# Patient Record
Sex: FEMALE | Race: WHITE | NOT HISPANIC OR LATINO | URBAN - METROPOLITAN AREA
[De-identification: names, ages, dates, MRNs, and addresses within clinical notes are randomized per-mention and may not be internally consistent; named-entity substitution may affect disease eponyms.]

---

## 2016-03-21 RX ORDER — OXYCODONE HYDROCHLORIDE 5 MG/1
1 TABLET ORAL
Qty: 0 | Refills: 0 | COMMUNITY
Start: 2016-03-21 | End: 2016-03-31

## 2017-03-01 ENCOUNTER — OUTPATIENT (OUTPATIENT)
Dept: OUTPATIENT SERVICES | Facility: HOSPITAL | Age: 72
LOS: 1 days | End: 2017-03-01
Payer: COMMERCIAL

## 2017-03-01 ENCOUNTER — APPOINTMENT (OUTPATIENT)
Dept: CT IMAGING | Facility: CLINIC | Age: 72
End: 2017-03-01

## 2017-03-01 DIAGNOSIS — C50.819 MALIGNANT NEOPLASM OF OVERLAPPING SITES OF UNSPECIFIED FEMALE BREAST: ICD-10-CM

## 2017-03-01 DIAGNOSIS — Z45.2 ENCOUNTER FOR ADJUSTMENT AND MANAGEMENT OF VASCULAR ACCESS DEVICE: Chronic | ICD-10-CM

## 2017-03-01 DIAGNOSIS — Z98.89 OTHER SPECIFIED POSTPROCEDURAL STATES: Chronic | ICD-10-CM

## 2017-03-01 PROCEDURE — 71260 CT THORAX DX C+: CPT

## 2017-03-01 PROCEDURE — 82565 ASSAY OF CREATININE: CPT

## 2017-03-17 ENCOUNTER — OUTPATIENT (OUTPATIENT)
Dept: OUTPATIENT SERVICES | Facility: HOSPITAL | Age: 72
LOS: 1 days | End: 2017-03-17
Payer: COMMERCIAL

## 2017-03-17 DIAGNOSIS — Z98.89 OTHER SPECIFIED POSTPROCEDURAL STATES: Chronic | ICD-10-CM

## 2017-03-17 DIAGNOSIS — C50.819 MALIGNANT NEOPLASM OF OVERLAPPING SITES OF UNSPECIFIED FEMALE BREAST: ICD-10-CM

## 2017-03-17 DIAGNOSIS — Z45.2 ENCOUNTER FOR ADJUSTMENT AND MANAGEMENT OF VASCULAR ACCESS DEVICE: Chronic | ICD-10-CM

## 2017-03-17 PROCEDURE — A9560: CPT

## 2017-03-17 PROCEDURE — 78472 GATED HEART PLANAR SINGLE: CPT | Mod: 26

## 2017-03-17 PROCEDURE — 78472 GATED HEART PLANAR SINGLE: CPT

## 2017-03-31 ENCOUNTER — APPOINTMENT (OUTPATIENT)
Dept: RADIATION ONCOLOGY | Facility: CLINIC | Age: 72
End: 2017-03-31

## 2017-03-31 ENCOUNTER — OUTPATIENT (OUTPATIENT)
Dept: OUTPATIENT SERVICES | Facility: HOSPITAL | Age: 72
LOS: 1 days | Discharge: HOME | End: 2017-03-31

## 2017-03-31 VITALS
TEMPERATURE: 97.6 F | HEART RATE: 107 BPM | RESPIRATION RATE: 18 BRPM | WEIGHT: 157.6 LBS | SYSTOLIC BLOOD PRESSURE: 165 MMHG | OXYGEN SATURATION: 98 % | DIASTOLIC BLOOD PRESSURE: 91 MMHG | BODY MASS INDEX: 25.33 KG/M2 | HEIGHT: 66 IN

## 2017-03-31 DIAGNOSIS — Z98.89 OTHER SPECIFIED POSTPROCEDURAL STATES: Chronic | ICD-10-CM

## 2017-03-31 DIAGNOSIS — I89.0 LYMPHEDEMA, NOT ELSEWHERE CLASSIFIED: ICD-10-CM

## 2017-03-31 DIAGNOSIS — Z45.2 ENCOUNTER FOR ADJUSTMENT AND MANAGEMENT OF VASCULAR ACCESS DEVICE: Chronic | ICD-10-CM

## 2017-04-17 VITALS
TEMPERATURE: 97.5 F | HEART RATE: 102 BPM | WEIGHT: 147.2 LBS | HEIGHT: 66 IN | BODY MASS INDEX: 23.66 KG/M2 | RESPIRATION RATE: 16 BRPM | OXYGEN SATURATION: 100 % | SYSTOLIC BLOOD PRESSURE: 179 MMHG | DIASTOLIC BLOOD PRESSURE: 93 MMHG

## 2017-04-17 RX ORDER — ERGOCALCIFEROL 1.25 MG/1
1.25 MG CAPSULE, LIQUID FILLED ORAL
Qty: 4 | Refills: 0 | Status: COMPLETED | COMMUNITY
Start: 2016-11-03

## 2017-04-17 RX ORDER — AMOXICILLIN 500 MG/1
500 CAPSULE ORAL
Qty: 30 | Refills: 0 | Status: COMPLETED | COMMUNITY
Start: 2017-01-17

## 2017-04-17 RX ORDER — ONDANSETRON 8 MG/1
8 TABLET, ORALLY DISINTEGRATING ORAL
Qty: 30 | Refills: 0 | Status: COMPLETED | COMMUNITY
Start: 2017-01-02

## 2017-04-17 RX ORDER — LETROZOLE TABLETS 2.5 MG/1
2.5 TABLET, FILM COATED ORAL
Qty: 30 | Refills: 0 | Status: COMPLETED | COMMUNITY
Start: 2016-10-11

## 2017-05-24 ENCOUNTER — OUTPATIENT (OUTPATIENT)
Dept: OUTPATIENT SERVICES | Facility: HOSPITAL | Age: 72
LOS: 1 days | End: 2017-05-24
Payer: COMMERCIAL

## 2017-05-24 DIAGNOSIS — Z98.89 OTHER SPECIFIED POSTPROCEDURAL STATES: Chronic | ICD-10-CM

## 2017-05-24 DIAGNOSIS — C50.819 MALIGNANT NEOPLASM OF OVERLAPPING SITES OF UNSPECIFIED FEMALE BREAST: ICD-10-CM

## 2017-05-24 DIAGNOSIS — Z45.2 ENCOUNTER FOR ADJUSTMENT AND MANAGEMENT OF VASCULAR ACCESS DEVICE: Chronic | ICD-10-CM

## 2017-05-24 PROCEDURE — 78472 GATED HEART PLANAR SINGLE: CPT

## 2017-05-24 PROCEDURE — 78472 GATED HEART PLANAR SINGLE: CPT | Mod: 26,52

## 2017-05-24 PROCEDURE — A9560: CPT

## 2017-05-26 ENCOUNTER — APPOINTMENT (OUTPATIENT)
Dept: CT IMAGING | Facility: CLINIC | Age: 72
End: 2017-05-26

## 2017-05-26 ENCOUNTER — OUTPATIENT (OUTPATIENT)
Dept: OUTPATIENT SERVICES | Facility: HOSPITAL | Age: 72
LOS: 1 days | End: 2017-05-26
Payer: COMMERCIAL

## 2017-05-26 DIAGNOSIS — Z98.89 OTHER SPECIFIED POSTPROCEDURAL STATES: Chronic | ICD-10-CM

## 2017-05-26 DIAGNOSIS — Z45.2 ENCOUNTER FOR ADJUSTMENT AND MANAGEMENT OF VASCULAR ACCESS DEVICE: Chronic | ICD-10-CM

## 2017-05-26 DIAGNOSIS — Z00.8 ENCOUNTER FOR OTHER GENERAL EXAMINATION: ICD-10-CM

## 2017-05-26 PROCEDURE — 82565 ASSAY OF CREATININE: CPT

## 2017-05-26 PROCEDURE — 71260 CT THORAX DX C+: CPT

## 2017-05-26 PROCEDURE — 70491 CT SOFT TISSUE NECK W/DYE: CPT

## 2017-05-31 ENCOUNTER — APPOINTMENT (OUTPATIENT)
Dept: VASCULAR SURGERY | Facility: CLINIC | Age: 72
End: 2017-05-31

## 2017-05-31 VITALS
RESPIRATION RATE: 16 BRPM | WEIGHT: 150 LBS | TEMPERATURE: 98.3 F | DIASTOLIC BLOOD PRESSURE: 79 MMHG | HEIGHT: 65 IN | HEART RATE: 113 BPM | SYSTOLIC BLOOD PRESSURE: 149 MMHG | OXYGEN SATURATION: 95 % | BODY MASS INDEX: 24.99 KG/M2

## 2017-05-31 DIAGNOSIS — Z82.49 FAMILY HISTORY OF ISCHEMIC HEART DISEASE AND OTHER DISEASES OF THE CIRCULATORY SYSTEM: ICD-10-CM

## 2017-05-31 DIAGNOSIS — R91.8 OTHER NONSPECIFIC ABNORMAL FINDING OF LUNG FIELD: ICD-10-CM

## 2017-05-31 DIAGNOSIS — J39.8 OTHER SPECIFIED DISEASES OF UPPER RESPIRATORY TRACT: ICD-10-CM

## 2017-05-31 DIAGNOSIS — Z84.1 FAMILY HISTORY OF DISORDERS OF KIDNEY AND URETER: ICD-10-CM

## 2017-05-31 DIAGNOSIS — Z86.2 PERSONAL HISTORY OF DISEASES OF THE BLOOD AND BLOOD-FORMING ORGANS AND CERTAIN DISORDERS INVOLVING THE IMMUNE MECHANISM: ICD-10-CM

## 2017-05-31 DIAGNOSIS — Z85.3 PERSONAL HISTORY OF MALIGNANT NEOPLASM OF BREAST: ICD-10-CM

## 2017-05-31 DIAGNOSIS — Z86.79 PERSONAL HISTORY OF OTHER DISEASES OF THE CIRCULATORY SYSTEM: ICD-10-CM

## 2017-06-15 ENCOUNTER — APPOINTMENT (OUTPATIENT)
Dept: RADIATION ONCOLOGY | Facility: CLINIC | Age: 72
End: 2017-06-15

## 2017-06-16 ENCOUNTER — APPOINTMENT (OUTPATIENT)
Dept: RADIATION ONCOLOGY | Facility: CLINIC | Age: 72
End: 2017-06-16

## 2017-08-18 ENCOUNTER — OUTPATIENT (OUTPATIENT)
Dept: OUTPATIENT SERVICES | Facility: HOSPITAL | Age: 72
LOS: 1 days | End: 2017-08-18
Payer: COMMERCIAL

## 2017-08-18 DIAGNOSIS — C50.819 MALIGNANT NEOPLASM OF OVERLAPPING SITES OF UNSPECIFIED FEMALE BREAST: ICD-10-CM

## 2017-08-18 DIAGNOSIS — Z45.2 ENCOUNTER FOR ADJUSTMENT AND MANAGEMENT OF VASCULAR ACCESS DEVICE: Chronic | ICD-10-CM

## 2017-08-18 DIAGNOSIS — Z98.89 OTHER SPECIFIED POSTPROCEDURAL STATES: Chronic | ICD-10-CM

## 2017-08-18 PROCEDURE — A9560: CPT

## 2017-08-18 PROCEDURE — 78472 GATED HEART PLANAR SINGLE: CPT

## 2017-08-18 PROCEDURE — 78472 GATED HEART PLANAR SINGLE: CPT | Mod: 26

## 2017-11-08 ENCOUNTER — OUTPATIENT (OUTPATIENT)
Dept: OUTPATIENT SERVICES | Facility: HOSPITAL | Age: 72
LOS: 1 days | End: 2017-11-08
Payer: COMMERCIAL

## 2017-11-08 DIAGNOSIS — Z98.89 OTHER SPECIFIED POSTPROCEDURAL STATES: Chronic | ICD-10-CM

## 2017-11-08 DIAGNOSIS — Z45.2 ENCOUNTER FOR ADJUSTMENT AND MANAGEMENT OF VASCULAR ACCESS DEVICE: Chronic | ICD-10-CM

## 2017-11-08 DIAGNOSIS — C50.819 MALIGNANT NEOPLASM OF OVERLAPPING SITES OF UNSPECIFIED FEMALE BREAST: ICD-10-CM

## 2017-11-08 PROCEDURE — 78472 GATED HEART PLANAR SINGLE: CPT | Mod: 26

## 2017-11-08 PROCEDURE — 78472 GATED HEART PLANAR SINGLE: CPT

## 2017-11-08 PROCEDURE — A9560: CPT

## 2018-02-01 ENCOUNTER — OUTPATIENT (OUTPATIENT)
Dept: OUTPATIENT SERVICES | Facility: HOSPITAL | Age: 73
LOS: 1 days | End: 2018-02-01
Payer: COMMERCIAL

## 2018-02-01 DIAGNOSIS — Z98.89 OTHER SPECIFIED POSTPROCEDURAL STATES: Chronic | ICD-10-CM

## 2018-02-01 DIAGNOSIS — Z45.2 ENCOUNTER FOR ADJUSTMENT AND MANAGEMENT OF VASCULAR ACCESS DEVICE: Chronic | ICD-10-CM

## 2018-02-01 DIAGNOSIS — C50.819 MALIGNANT NEOPLASM OF OVERLAPPING SITES OF UNSPECIFIED FEMALE BREAST: ICD-10-CM

## 2018-02-01 PROCEDURE — A9560: CPT

## 2018-02-01 PROCEDURE — 78472 GATED HEART PLANAR SINGLE: CPT | Mod: 26

## 2018-02-01 PROCEDURE — 78472 GATED HEART PLANAR SINGLE: CPT

## 2018-05-02 ENCOUNTER — OUTPATIENT (OUTPATIENT)
Dept: OUTPATIENT SERVICES | Facility: HOSPITAL | Age: 73
LOS: 1 days | End: 2018-05-02
Payer: COMMERCIAL

## 2018-05-02 DIAGNOSIS — Z98.89 OTHER SPECIFIED POSTPROCEDURAL STATES: Chronic | ICD-10-CM

## 2018-05-02 DIAGNOSIS — C50.819 MALIGNANT NEOPLASM OF OVERLAPPING SITES OF UNSPECIFIED FEMALE BREAST: ICD-10-CM

## 2018-05-02 DIAGNOSIS — Z45.2 ENCOUNTER FOR ADJUSTMENT AND MANAGEMENT OF VASCULAR ACCESS DEVICE: Chronic | ICD-10-CM

## 2018-05-02 PROCEDURE — A9560: CPT

## 2018-05-02 PROCEDURE — 78472 GATED HEART PLANAR SINGLE: CPT | Mod: 26

## 2018-05-02 PROCEDURE — 78472 GATED HEART PLANAR SINGLE: CPT

## 2018-05-07 ENCOUNTER — APPOINTMENT (OUTPATIENT)
Dept: CT IMAGING | Facility: CLINIC | Age: 73
End: 2018-05-07
Payer: COMMERCIAL

## 2018-05-07 ENCOUNTER — OUTPATIENT (OUTPATIENT)
Dept: OUTPATIENT SERVICES | Facility: HOSPITAL | Age: 73
LOS: 1 days | End: 2018-05-07
Payer: COMMERCIAL

## 2018-05-07 DIAGNOSIS — Z98.89 OTHER SPECIFIED POSTPROCEDURAL STATES: Chronic | ICD-10-CM

## 2018-05-07 DIAGNOSIS — Z45.2 ENCOUNTER FOR ADJUSTMENT AND MANAGEMENT OF VASCULAR ACCESS DEVICE: Chronic | ICD-10-CM

## 2018-05-07 DIAGNOSIS — C50.819 MALIGNANT NEOPLASM OF OVERLAPPING SITES OF UNSPECIFIED FEMALE BREAST: ICD-10-CM

## 2018-05-07 PROCEDURE — 71250 CT THORAX DX C-: CPT | Mod: 26

## 2018-05-07 PROCEDURE — 74177 CT ABD & PELVIS W/CONTRAST: CPT | Mod: 26

## 2018-05-07 PROCEDURE — 82565 ASSAY OF CREATININE: CPT

## 2018-05-07 PROCEDURE — 74177 CT ABD & PELVIS W/CONTRAST: CPT

## 2018-05-07 PROCEDURE — 71250 CT THORAX DX C-: CPT

## 2018-06-28 ENCOUNTER — OUTPATIENT (OUTPATIENT)
Dept: OUTPATIENT SERVICES | Facility: HOSPITAL | Age: 73
LOS: 1 days | End: 2018-06-28
Payer: COMMERCIAL

## 2018-06-28 ENCOUNTER — APPOINTMENT (OUTPATIENT)
Dept: MRI IMAGING | Facility: CLINIC | Age: 73
End: 2018-06-28
Payer: COMMERCIAL

## 2018-06-28 DIAGNOSIS — Z98.89 OTHER SPECIFIED POSTPROCEDURAL STATES: Chronic | ICD-10-CM

## 2018-06-28 DIAGNOSIS — Z45.2 ENCOUNTER FOR ADJUSTMENT AND MANAGEMENT OF VASCULAR ACCESS DEVICE: Chronic | ICD-10-CM

## 2018-06-28 DIAGNOSIS — Z00.8 ENCOUNTER FOR OTHER GENERAL EXAMINATION: ICD-10-CM

## 2018-06-28 PROCEDURE — 82565 ASSAY OF CREATININE: CPT

## 2018-06-28 PROCEDURE — 73723 MRI JOINT LWR EXTR W/O&W/DYE: CPT | Mod: 26,LT

## 2018-06-28 PROCEDURE — A9585: CPT

## 2018-06-28 PROCEDURE — 73723 MRI JOINT LWR EXTR W/O&W/DYE: CPT

## 2018-07-31 ENCOUNTER — OTHER (OUTPATIENT)
Age: 73
End: 2018-07-31

## 2018-08-03 ENCOUNTER — OUTPATIENT (OUTPATIENT)
Dept: OUTPATIENT SERVICES | Facility: HOSPITAL | Age: 73
LOS: 1 days | End: 2018-08-03
Payer: COMMERCIAL

## 2018-08-03 DIAGNOSIS — Z98.89 OTHER SPECIFIED POSTPROCEDURAL STATES: Chronic | ICD-10-CM

## 2018-08-03 DIAGNOSIS — C50.819 MALIGNANT NEOPLASM OF OVERLAPPING SITES OF UNSPECIFIED FEMALE BREAST: ICD-10-CM

## 2018-08-03 DIAGNOSIS — Z45.2 ENCOUNTER FOR ADJUSTMENT AND MANAGEMENT OF VASCULAR ACCESS DEVICE: Chronic | ICD-10-CM

## 2018-08-03 PROCEDURE — A9560: CPT

## 2018-08-03 PROCEDURE — 78472 GATED HEART PLANAR SINGLE: CPT

## 2018-08-03 PROCEDURE — 78472 GATED HEART PLANAR SINGLE: CPT | Mod: 26

## 2018-08-06 ENCOUNTER — APPOINTMENT (OUTPATIENT)
Dept: ORTHOPEDIC SURGERY | Facility: CLINIC | Age: 73
End: 2018-08-06
Payer: COMMERCIAL

## 2018-08-06 VITALS
SYSTOLIC BLOOD PRESSURE: 144 MMHG | HEIGHT: 65 IN | DIASTOLIC BLOOD PRESSURE: 85 MMHG | HEART RATE: 74 BPM | BODY MASS INDEX: 24.99 KG/M2 | WEIGHT: 150 LBS

## 2018-08-06 DIAGNOSIS — Z82.61 FAMILY HISTORY OF ARTHRITIS: ICD-10-CM

## 2018-08-06 DIAGNOSIS — Z80.1 FAMILY HISTORY OF MALIGNANT NEOPLASM OF TRACHEA, BRONCHUS AND LUNG: ICD-10-CM

## 2018-08-06 DIAGNOSIS — Z78.9 OTHER SPECIFIED HEALTH STATUS: ICD-10-CM

## 2018-08-06 PROCEDURE — 99203 OFFICE O/P NEW LOW 30 MIN: CPT

## 2018-08-06 PROCEDURE — 73502 X-RAY EXAM HIP UNI 2-3 VIEWS: CPT | Mod: LT

## 2018-08-06 RX ORDER — CARBOPLATIN 10 MG/ML
INJECTION, SOLUTION INTRAVENOUS
Refills: 0 | Status: DISCONTINUED | COMMUNITY
End: 2018-08-06

## 2018-08-06 RX ORDER — TRASTUZUMAB 150 MG/7.4ML
INJECTION, POWDER, LYOPHILIZED, FOR SOLUTION INTRAVENOUS
Refills: 0 | Status: DISCONTINUED | COMMUNITY
End: 2018-08-06

## 2018-08-06 RX ORDER — PACLITAXEL 100 MG/20ML
INJECTION, POWDER, LYOPHILIZED, FOR SUSPENSION INTRAVENOUS
Refills: 0 | Status: DISCONTINUED | COMMUNITY
End: 2018-08-06

## 2018-09-07 ENCOUNTER — OUTPATIENT (OUTPATIENT)
Dept: OUTPATIENT SERVICES | Facility: HOSPITAL | Age: 73
LOS: 1 days | End: 2018-09-07
Payer: COMMERCIAL

## 2018-09-07 DIAGNOSIS — C50.819 MALIGNANT NEOPLASM OF OVERLAPPING SITES OF UNSPECIFIED FEMALE BREAST: ICD-10-CM

## 2018-09-07 DIAGNOSIS — Z98.89 OTHER SPECIFIED POSTPROCEDURAL STATES: Chronic | ICD-10-CM

## 2018-09-07 DIAGNOSIS — Z45.2 ENCOUNTER FOR ADJUSTMENT AND MANAGEMENT OF VASCULAR ACCESS DEVICE: Chronic | ICD-10-CM

## 2018-09-07 PROCEDURE — 87070 CULTURE OTHR SPECIMN AEROBIC: CPT

## 2018-09-07 PROCEDURE — 87186 SC STD MICRODIL/AGAR DIL: CPT

## 2018-09-09 LAB
-  AMIKACIN: SIGNIFICANT CHANGE UP
-  AZTREONAM: SIGNIFICANT CHANGE UP
-  CEFEPIME: SIGNIFICANT CHANGE UP
-  CEFTAZIDIME: SIGNIFICANT CHANGE UP
-  CIPROFLOXACIN: SIGNIFICANT CHANGE UP
-  GENTAMICIN: SIGNIFICANT CHANGE UP
-  IMIPENEM: SIGNIFICANT CHANGE UP
-  LEVOFLOXACIN: SIGNIFICANT CHANGE UP
-  MEROPENEM: SIGNIFICANT CHANGE UP
-  PIPERACILLIN/TAZOBACTAM: SIGNIFICANT CHANGE UP
-  TOBRAMYCIN: SIGNIFICANT CHANGE UP
CULTURE RESULTS: SIGNIFICANT CHANGE UP
METHOD TYPE: SIGNIFICANT CHANGE UP
ORGANISM # SPEC MICROSCOPIC CNT: SIGNIFICANT CHANGE UP
ORGANISM # SPEC MICROSCOPIC CNT: SIGNIFICANT CHANGE UP
SPECIMEN SOURCE: SIGNIFICANT CHANGE UP

## 2018-09-14 ENCOUNTER — OUTPATIENT (OUTPATIENT)
Dept: OUTPATIENT SERVICES | Facility: HOSPITAL | Age: 73
LOS: 1 days | End: 2018-09-14
Payer: COMMERCIAL

## 2018-09-14 DIAGNOSIS — C50.819 MALIGNANT NEOPLASM OF OVERLAPPING SITES OF UNSPECIFIED FEMALE BREAST: ICD-10-CM

## 2018-09-14 DIAGNOSIS — Z45.2 ENCOUNTER FOR ADJUSTMENT AND MANAGEMENT OF VASCULAR ACCESS DEVICE: Chronic | ICD-10-CM

## 2018-09-14 DIAGNOSIS — Z98.89 OTHER SPECIFIED POSTPROCEDURAL STATES: Chronic | ICD-10-CM

## 2018-09-14 PROCEDURE — 36569 INSJ PICC 5 YR+ W/O IMAGING: CPT

## 2018-09-14 PROCEDURE — 76942 ECHO GUIDE FOR BIOPSY: CPT

## 2018-09-14 PROCEDURE — 76000 FLUOROSCOPY <1 HR PHYS/QHP: CPT

## 2018-10-04 ENCOUNTER — INPATIENT (INPATIENT)
Facility: HOSPITAL | Age: 73
LOS: 7 days | Discharge: HOME HEALTH | DRG: 315 | End: 2018-10-12
Attending: INTERNAL MEDICINE | Admitting: FAMILY MEDICINE
Payer: MEDICARE

## 2018-10-04 VITALS — HEIGHT: 66 IN | WEIGHT: 141.98 LBS

## 2018-10-04 DIAGNOSIS — D89.9 DISORDER INVOLVING THE IMMUNE MECHANISM, UNSPECIFIED: ICD-10-CM

## 2018-10-04 DIAGNOSIS — Z98.89 OTHER SPECIFIED POSTPROCEDURAL STATES: Chronic | ICD-10-CM

## 2018-10-04 DIAGNOSIS — Z45.2 ENCOUNTER FOR ADJUSTMENT AND MANAGEMENT OF VASCULAR ACCESS DEVICE: Chronic | ICD-10-CM

## 2018-10-04 DIAGNOSIS — C50.919 MALIGNANT NEOPLASM OF UNSPECIFIED SITE OF UNSPECIFIED FEMALE BREAST: ICD-10-CM

## 2018-10-04 DIAGNOSIS — R78.81 BACTEREMIA: ICD-10-CM

## 2018-10-04 DIAGNOSIS — D72.823 LEUKEMOID REACTION: ICD-10-CM

## 2018-10-04 DIAGNOSIS — D64.9 ANEMIA, UNSPECIFIED: ICD-10-CM

## 2018-10-04 DIAGNOSIS — I10 ESSENTIAL (PRIMARY) HYPERTENSION: ICD-10-CM

## 2018-10-04 DIAGNOSIS — C50.911 MALIGNANT NEOPLASM OF UNSPECIFIED SITE OF RIGHT FEMALE BREAST: ICD-10-CM

## 2018-10-04 LAB
ANION GAP SERPL CALC-SCNC: 10 MMOL/L — SIGNIFICANT CHANGE UP (ref 5–17)
APTT BLD: 35.1 SEC — SIGNIFICANT CHANGE UP (ref 27.5–37.4)
BLD GP AB SCN SERPL QL: SIGNIFICANT CHANGE UP
BUN SERPL-MCNC: 21 MG/DL — HIGH (ref 8–20)
CALCIUM SERPL-MCNC: 9.5 MG/DL — SIGNIFICANT CHANGE UP (ref 8.6–10.2)
CHLORIDE SERPL-SCNC: 103 MMOL/L — SIGNIFICANT CHANGE UP (ref 98–107)
CO2 SERPL-SCNC: 27 MMOL/L — SIGNIFICANT CHANGE UP (ref 22–29)
CREAT SERPL-MCNC: 0.61 MG/DL — SIGNIFICANT CHANGE UP (ref 0.5–1.3)
GLUCOSE SERPL-MCNC: 145 MG/DL — HIGH (ref 70–115)
HCT VFR BLD CALC: 23.5 % — LOW (ref 37–47)
HGB BLD-MCNC: 6.5 G/DL — CRITICAL LOW (ref 12–16)
INR BLD: 2.18 RATIO — HIGH (ref 0.88–1.16)
MCHC RBC-ENTMCNC: 25.3 PG — LOW (ref 27–31)
MCHC RBC-ENTMCNC: 27.7 G/DL — LOW (ref 32–36)
MCV RBC AUTO: 91.4 FL — SIGNIFICANT CHANGE UP (ref 81–99)
PLATELET # BLD AUTO: 251 K/UL — SIGNIFICANT CHANGE UP (ref 150–400)
POTASSIUM SERPL-MCNC: 4.6 MMOL/L — SIGNIFICANT CHANGE UP (ref 3.5–5.3)
POTASSIUM SERPL-SCNC: 4.6 MMOL/L — SIGNIFICANT CHANGE UP (ref 3.5–5.3)
PROTHROM AB SERPL-ACNC: 24.4 SEC — HIGH (ref 9.8–12.7)
RBC # BLD: 2.57 M/UL — LOW (ref 4.4–5.2)
RBC # FLD: 16.8 % — HIGH (ref 11–15.6)
SODIUM SERPL-SCNC: 140 MMOL/L — SIGNIFICANT CHANGE UP (ref 135–145)
TYPE + AB SCN PNL BLD: SIGNIFICANT CHANGE UP
WBC # BLD: 10.9 K/UL — HIGH (ref 4.8–10.8)
WBC # FLD AUTO: 10.9 K/UL — HIGH (ref 4.8–10.8)

## 2018-10-04 PROCEDURE — 99254 IP/OBS CNSLTJ NEW/EST MOD 60: CPT

## 2018-10-04 PROCEDURE — 99223 1ST HOSP IP/OBS HIGH 75: CPT

## 2018-10-04 PROCEDURE — 71045 X-RAY EXAM CHEST 1 VIEW: CPT | Mod: 26

## 2018-10-04 PROCEDURE — 99285 EMERGENCY DEPT VISIT HI MDM: CPT

## 2018-10-04 RX ORDER — VANCOMYCIN HCL 1 G
1000 VIAL (EA) INTRAVENOUS ONCE
Qty: 0 | Refills: 0 | Status: COMPLETED | OUTPATIENT
Start: 2018-10-04 | End: 2018-10-04

## 2018-10-04 RX ORDER — AMLODIPINE BESYLATE 2.5 MG/1
5 TABLET ORAL DAILY
Qty: 0 | Refills: 0 | Status: DISCONTINUED | OUTPATIENT
Start: 2018-10-04 | End: 2018-10-12

## 2018-10-04 RX ORDER — FUROSEMIDE 40 MG
20 TABLET ORAL ONCE
Qty: 0 | Refills: 0 | Status: COMPLETED | OUTPATIENT
Start: 2018-10-04 | End: 2018-10-05

## 2018-10-04 RX ADMIN — Medication 250 MILLIGRAM(S): at 20:36

## 2018-10-04 NOTE — H&P ADULT - PROBLEM SELECTOR PLAN 1
pt's anemia is likely of chronic disease,, no overt gi bleed. prbc transfusion, follow repeat cbc. pt. hemodynamically stable. gi and hem / onc on the case.  hold xarelto if possible, will follow oncologist dr. Galicia and dr. Black recommendations.

## 2018-10-04 NOTE — CONSULT NOTE ADULT - PROBLEM SELECTOR PROBLEM 3
Malignant neoplasm of both breasts in female, estrogen receptor negative, unspecified site of breast

## 2018-10-04 NOTE — ED ADULT NURSE NOTE - PMH
Breast cancer    DVT of upper extremity (deep vein thrombosis)  RIGHT  Essential hypertension    Lymphedema    PICC (peripherally inserted central catheter) in place

## 2018-10-04 NOTE — ED PROVIDER NOTE - OBJECTIVE STATEMENT
:72 year old woman with history of breast cancer, metastatic, SVC syndrome, prior DVT, on xarelto, on new medication for metastatic breast cancer, with recurrent PICC, midline infection was noted to have non functional midline, removed yesterday and noted to have enterococccus bactermia and now with anemia and low ferritin. Follows up with Dr Galicia. No complaints of GI bleeding. No nausea, vomiting. No rectal bleeding. No fever. Sent for PRBC transfusion. GI evaluation for anemia and low ferritin. Patient had EGD and colonoscopy performed less than 1 year ago. No ulcers. 2 polyps on colonoscopy and diverticulosis.     pts feeling ok offers no c/o does have rash and erythema to the left upper arm  -seen by ID beatrice admitted for IV abx renal dosing and seen by GI

## 2018-10-04 NOTE — H&P ADULT - FAMILY HISTORY
Family history of cancer of frontal sinus, followed by enucleation     Family history of lung cancer     Sibling  Still living? Unknown  Family history of breast cancer in first degree relative, Age at diagnosis: Age Unknown

## 2018-10-04 NOTE — CONSULT NOTE ADULT - SUBJECTIVE AND OBJECTIVE BOX
[facility]  INFECTIOUS DISEASES AND INTERNAL MEDICINE at [place]  =======================================================  Murray Duncan MD  Diplomates American Board of Internal Medicine and Infectious Diseases  =======================================================    [id number]  DOMINIK Hi is a 72y  Female with HER-2 positive breast cancer,  initial right breast involvement and also involvement of the left breast, she had been treated with chemotherapy including Abraxane, Carbo and Herceptin, She had also received Taxotere and Perjeta, also with 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  She had a longstanding PICC line for infusion of recent chemotherapy of Poziotinib, as part of the [id number] study.  on 9/7/18, there was an infection of the PICC line and it was removed.  She was given a course of Keflex and on 9/14/18, a midline was placed.   patient recently developed drainage under the dressing of the mid line. Also noted was that the midline was flushing but not drawing.  It was removed 10/3/18; Blood cultures x 2 sets were sent and 4 of 4 bottles are growing enterococcus. Of note, patient prior PICC tip grew out pan-susceptible Pseudomonas.     No current fevers. WBC of 14K  Photos from midline dressing reviewed      =======================================================  Past Medical & Surgical Hx:  =====================  PAST MEDICAL & SURGICAL HISTORY:  Essential hypertension  Lymphedema  PICC (peripherally inserted central catheter) in place  DVT of upper extremity (deep vein thrombosis): RIGHT  Breast cancer  S/P thoracentesis: 3/2016  S/P biopsy: R breast  PICC (peripherally inserted central catheter) in place      Problem List:  ==========  HEALTH ISSUES - PROBLEM Dx:      Social Hx:  =======  no toxic habits currently    FAMILY HISTORY:  Family history of breast cancer in first degree relative (Sibling): s/p mastectomy  Family history of lung cancer  Family history of cancer of frontal sinus: followed by enucleation  no significant family history of immunosuppressive disorders in mother or father    Allergies    No Known Allergies    Intolerances        Antibiotics:  vancomycin  IVPB 1000 milliGRAM(s) IV Intermittent once    Other medications:          =======================================================  REVIEW OF SYSTEMS:  as above  all other ROS negative    ======================================================  Physical Exam:  ============  T(F): 98.6 (04 Oct 2018 13:02), Max: 98.6 (04 Oct 2018 13:02)  HR: 114 (04 Oct 2018 13:02)  BP: 128/74 (04 Oct 2018 13:02)  RR: 18 (04 Oct 2018 13:02)  SpO2: 96% (04 Oct 2018 13:02) (96% - 96%)  Height (cm): 167.64 (10-04-18 @ 12:26)  Weight (kg): 64.4 (10-04-18 @ 12:26)  BMI (kg/m2): 22.9 (10-04-18 @ 12:26)  BSA (m2): 1.73 (10-04-18 @ 12:26)    General:  No acute distress.  Eye: Pupils are equal, round and reactive to light, Extraocular movements are intact, Normal conjunctiva.  HENT: Normocephalic, Oral mucosa is moist, No pharyngeal erythema, No sinus tenderness.  Neck: Supple, No lymphadenopathy.  Respiratory: Lungs are clear to auscultation, Respirations are non-labored.  Cardiovascular: Normal rate, Regular rhythm, No murmur, Good pulses equal in all extremities,  Right UPPER ext with compression hosiery.   Gastrointestinal: Soft, Non-tender, Non-distended, Normal bowel sounds.  Genitourinary: No costovertebral angle tenderness.  Lymphatics: No lymphadenopathy neck,   Musculoskeletal: Normal range of motion, Normal strength.  Integumentary: No rash.  Neurologic: Alert, Oriented, No focal deficits, Cranial Nerves II-XII are grossly intact.  Psychiatric: Appropriate mood & affect.      =======================================================  Labs:  ====  Labs:                        6.9    14.1  )-----------( 271      ( 03 Oct 2018 17:38 )             24.9              Culture - Blood (collected 10-03-18 @ 17:38)  Source: .Blood    Culture - Blood (collected 10-03-18 @ 17:38)  Source: .Blood  Organism: Blood Culture PCR (10-04-18 @ 12:06)  Organism: Blood Culture PCR (10-04-18 @ 12:06)    Sensitivities:      -  Enterococcus species: Detec      Method Type: PCR

## 2018-10-04 NOTE — ED ADULT NURSE NOTE - NSFALLRSKPASTHIST_ED_ALL_ED
Opal De La Rosa MD [facility] Internal Medicine [phone]        Informed patient to continue his dose of 2.5 mg.  F/up as scheduled 11-7-17. no

## 2018-10-04 NOTE — ED ADULT NURSE REASSESSMENT NOTE - NS ED NURSE REASSESS COMMENT FT1
Assumed pt care at 2030.  Pt awake, alert and oriented x3 offering no complaints at this time.  #20 IV to left arm, patent.  IV abx infusing at this time.  Pt aware of plan of care.

## 2018-10-04 NOTE — ED ADULT NURSE REASSESSMENT NOTE - NS ED NURSE REASSESS COMMENT FT1
Upon preparing to  blood, IV found to be kinked and not infusing vanco.  New IV placed, #20 in left hand, IV vanco infusing well at this time. Handoff report given to LEONOR Garcia.

## 2018-10-04 NOTE — H&P ADULT - HISTORY OF PRESENT ILLNESS
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 pt. had a longstanding PICC line for infusion of recent chemotherapy of Poziotinib.   on 9/7/18, there was an infection of the PICC line and it was removed.  She was given a course of Keflex and on 9/14/18, a midline was placed.   patient recently developed drainage under the dressing of the mid line. mid line was not working properly and  It was removed 10/3/18; Blood cultures x 2 sets were sent and 4 of 4 bottles are growing enterococcus. Of note, patient prior PICC tip grew out pan-susceptible Pseudomonas.  pt. has no fever. no abd. pain. no n/v/d. no blood per rectum, no cp. no sob. pt. was evaluated by ID, GI and hem / onc today. pt. has anemia but did not have overt GI bleed as per GI and rectal exam per GI did not show any bleeding. 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 pt. had a longstanding PICC line for infusion of recent chemotherapy of Poziotinib.   on 9/7/18, there was an infection of the PICC line and it was removed.  She was given a course of Keflex and on 9/14/18, a midline was placed.   patient recently developed drainage under the dressing of the mid line. mid line was not working properly and  It was removed 10/3/18; Blood cultures x 2 sets were sent and 4 of 4 bottles are growing enterococcus. Of note, patient prior PICC tip grew out pan-susceptible Pseudomonas.  pt. has no fever. no abd. pain. no n/v/d. no blood per rectum, no cp. no sob. pt. was evaluated by ID, GI and hem / onc today. pt. has anemia but did not have overt GI bleed as per GI and rectal exam per GI did not show any bleeding. pt. reports that she was diagnosed with blood clot in her rt. shoulder area in 2015, was started on xeralto, about 8 months later on repeat doppler no blood clot was found but her oncologist kept her on xeralto. 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 pt. had a longstanding PICC line for infusion of recent chemotherapy of Poziotinib.   on 9/7/18, there was an infection of the PICC line and it was removed.  She was given a course of Keflex and on 9/14/18, a midline was placed.   patient recently developed drainage under the dressing of the mid line. mid line was not working properly and  It was removed 10/3/18; Blood cultures x 2 sets were sent and 4 of 4 bottles are growing enterococcus. Of note, patient prior PICC tip grew out pan-susceptible Pseudomonas.  pt. has no fever. no abd. pain. no n/v/d. no blood per rectum, no cp. no sob. pt. was evaluated by ID, GI and hem / onc today. pt. has anemia but did not have overt GI bleed as per GI and rectal exam per GI did not show any bleeding. pt. reports that she was diagnosed with blood clot in her rt. shoulder area in 2015, was started on xeralto, about 8 months later on repeat doppler no blood clot was found but her oncologist kept her on xeralto. pt. was sent to ER by her oncologist dr. Galicia.

## 2018-10-04 NOTE — ED ADULT TRIAGE NOTE - CHIEF COMPLAINT QUOTE
Patient arrived to ED today after she had a blood culture done and preformed yesterday and that she must need iv antibiotics.  Patient had PICC line removed yesterday.  Patient last received oral chemo yesterday.

## 2018-10-04 NOTE — ED STATDOCS - OBJECTIVE STATEMENT
Telemedicine assessment was conducted (using real time 2 way audio-video technology) by Ld Guillen MD located at [address] [place] [id number]  ++++++++++++++++++++++++  Pertinent patient history and initial plan: 72 y F c midline removed yesterday (placed for chemo -- stage IV breast CA), last infusion approx 4 mon ago but was flushed approx 1 wk ago.  No f/c.  Has been feeling fatigued and with malaise.  Recent dx anemia 6.8 (2 days ago).  Had diffuse rash several weeks ago, recently no rash.  Has pain at site, some d/c from site of midline.  2 wk ago had PICC that had been removed 2/2 local skin infection.  Midline was placed at that time.  Not currently on abx.    MDM -- infected midline.  IV abx.  Blood cx.  Admit.  --BMM

## 2018-10-04 NOTE — CONSULT NOTE ADULT - SUBJECTIVE AND OBJECTIVE BOX
HPI: Patient is a 72y Female seen on consultation for the evaluation and management of metastatic breast cancer, Her-2 positive, heavily treated. Diagnosed in 2015  after presenting with right-sided pleural effusion , weight loss, extesnive adenopathy and bilateral breast cancer, ER/UT, Her-2 pos.  Currently on study drug Poziotinib, an oral pan Her-2 inhibitor.  She has active chest wall disease and lung involvement.  She has SVC Sx with dilated veins on chest wall and obstructed veins in neck.  Has had multiple PICC lines LUE; recently discontinued because of infection; midline placed after 3-4 days on po antibiotics; Midline infected; d/c'd yesterday; given po Keflex; Bl Cx growing enterococcus species.  Instructed to come to ER for treatment.  has no fevers, chills or sweats, nausea, vomiting or abdominal; pain.  Has diarrhea from study drug; drug on hold  Also anemic, with fall in Hb without reported bleeding, but serum ferritin is low.  She has been on long term steroids.  has known avascular necrosis of hip. Not considered surgical candidate.     PAST MEDICAL & SURGICAL HISTORY:  Essential hypertension  Lymphedema  PICC (peripherally inserted central catheter) in place  DVT of upper extremity (deep vein thrombosis): RIGHT  Breast cancer  S/P thoracentesis: 3/2016  S/P biopsy: R breast  PICC (peripherally inserted central catheter) in place      REVIEW OF SYSTEMS      General:Fatigued	    Skin/Breast:Dilated veins on chest and abdominal wall, resolving rash  	  Ophthalmologic:no double visio  	  ENMT:	no sore throat    Respiratory and Thorax:ANGEL  	  Cardiovascular:	no chest pain or palpitations    Gastrointestinal:	No abdominal pain, nausea or vomiting    Genitourinary:	no dysuria    Musculoskeletal:	left hip pain    Neurological:	no tremor    	        	    Allergic/Immunologic:	    MEDICATIONS  (STANDING):  vancomycin  IVPB 1000 milliGRAM(s) IV Intermittent once    MEDICATIONS  (PRN):      Allergies    No Known Allergies    Intolerances        SOCIAL HISTORY:    Smoking Status:denied  Alcohol:Denied  Marital Status:  Occupation:not working    FAMILY HISTORY:  Family history of breast cancer in first degree relative (Sibling): s/p mastectomy  Family history of lung cancer  Family history of cancer of frontal sinus: followed by enucleation      	      Vital Signs Last 24 Hrs  T(C): 37 (04 Oct 2018 13:02), Max: 37 (04 Oct 2018 13:02)  T(F): 98.6 (04 Oct 2018 13:02), Max: 98.6 (04 Oct 2018 13:02)  HR: 114 (04 Oct 2018 13:02) (114 - 114)  BP: 128/74 (04 Oct 2018 13:02) (128/74 - 128/74)  BP(mean): --  RR: 18 (04 Oct 2018 13:02) (18 - 18)  SpO2: 96% (04 Oct 2018 13:02) (96% - 96%)    PHYSICAL EXAM:      Constitutional:Chronically ill    Eyes:anicteric, pale    ENMT:no lesion    Neck:Dilated neck veins    Breasts:right breast contractyed with tumor, with nodularuity in chest wall, mild erythema; left breast without palp mass        Respiratory:Clear    Cardiovascular:RRR normal S1S2    Gastrointestinal:Soft, non-tender            Extremities:Bilateral UE lymphedema            Skin:Dilated veins on chest and abdominal wall                    LABS:                        6.9    14.1  )-----------( 271      ( 03 Oct 2018 17:38 )             24.9                 RADIOLOGY & ADDITIONAL STUDIES:

## 2018-10-04 NOTE — CONSULT NOTE ADULT - ASSESSMENT
Patient with anemia with bacteremia with no evidence of overt GI bleeding. She gets frequent nose bleeding. Also she had EGD and colonoscopy performed less than 1 year ago. She is on xarelto and also on prednisone. No NSAIDs. At this time, I recommend consideration for stopping xarelto if it can be stopped. Can consider iron infusion/supplementation once possible.   Transfuse PRBC  Treat for bacteremia  Will not recommend EGD or colonoscopy at this time

## 2018-10-04 NOTE — CONSULT NOTE ADULT - ASSESSMENT
Imp: 1.  Metastatic ER,OR, Her-2 pos breast cancer, on oral pan Her-2 inhibitor, admitted with bacteremia secondary to midline infection, growing enterococcus;    Discussed with ID who will see patient and advise  2. Anemia, with low serum ferritin on long term steroids; needs transfusion and IV iron     Requested GI consult ?EGD  3.  Additional chemotherapy was infection clears  4.  Will d/w IR Radiology if other access can be obtained-could never havew port because of obstruction;  never stented; has been radiated in past for SVC

## 2018-10-04 NOTE — CONSULT NOTE ADULT - ASSESSMENT
This 72 y.o. F with breast cancer undergoing chemotherapy, prior PICC line infection, and now here with mid line infection, s/p removal, blood cultures with Enterococcus bacteremia.     - repeat cultures x 2 sets ordered  - will start vancomycin 1 gram x 1 dose     - needs to check CMP or BMP.   - dose Vancomycin base on renal function.     will trend WBC   monitor for fevers      - will check echocardiogram to r/o valvular lesions.

## 2018-10-04 NOTE — H&P ADULT - NSHPPHYSICALEXAM_GEN_ALL_CORE
General:  female medium build lying in bed in NAD.   HEENT: AT, NC. PERRL. intact EOM. no throat erythema or exudate.   Neck: supple. no JVD.  Chest: CTA bilaterally  Heart: S1,S2 II VI XI. RRR. no heart murmur.  Abdomen: soft. non-tender. obese, + BS.   Rectal : deferred by pt. had one done by GI and no rectal bleeding reported.  Ext: no C/C/E. no calf tenderness on either side.  ROM of all ext. intact.  Vascular : 2 + dp B/L.   Neuro: AAO x3. no focal weakness. no speech disorder. CN II to XII intact. motor / sensory intact.  Skin: no rash noted, mild skin pallor. no diaphoresis.

## 2018-10-04 NOTE — ED ADULT NURSE REASSESSMENT NOTE - NS ED NURSE REASSESS COMMENT FT1
Transport present to take pt to [facility], spoke with Dr. Hood pt is stable and may go to test before transfusion started.

## 2018-10-04 NOTE — CONSULT NOTE ADULT - SUBJECTIVE AND OBJECTIVE BOX
HPI:72 year old woman with history of breast cancer, metastatic, SVC syndrome, prior DVT, on xarelto, on new medication for metastatic breast cancer, with recurrent PICC, midline infection was noted to have non functional midline, removed yesterday and noted to have enterococccus bactermia and now with anemia and low ferritin. Follows up with Dr Galicia. No complaints of GI bleeding. No nausea, vomiting. No rectal bleeding. No fever. Sent for PRBC transfusion. GI evaluation for anemia and low ferritin. Patient had EGD and colonoscopy performed less than 1 year ago. No ulcers. 2 polyps on colonoscopy and diverticulosis.       PAST MEDICAL & SURGICAL HISTORY:  Essential hypertension  Lymphedema  PICC (peripherally inserted central catheter) in place  DVT of upper extremity (deep vein thrombosis): RIGHT  Breast cancer  S/P thoracentesis: 3/2016  S/P biopsy: R breast  PICC (peripherally inserted central catheter) in place      ROS:  No Heartburn, regurgitation, dysphagia, odynophagia.  No dyspepsia  No abdominal pain.    No Nausea, vomiting.  No Bleeding.  No hematemesis.   No diarrhea.    No hematochesia.  No weight loss, anorexia.  No edema.      MEDICATIONS  (STANDING):  vancomycin  IVPB 1000 milliGRAM(s) IV Intermittent once    MEDICATIONS  (PRN):      Allergies    No Known Allergies    Intolerances        SOCIAL HISTORY:Denies x 3    ENDOSCOPIC/GI HISTORY:EGD/Colonoscopy about 7-8 months ago. Colon polyps and diverticulosis    FAMILY HISTORY:  Family history of breast cancer in first degree relative (Sibling): s/p mastectomy  Family history of lung cancer  Family history of cancer of frontal sinus: followed by enucleation      Vital Signs Last 24 Hrs  T(C): 37 (04 Oct 2018 13:02), Max: 37 (04 Oct 2018 13:02)  T(F): 98.6 (04 Oct 2018 13:02), Max: 98.6 (04 Oct 2018 13:02)  HR: 114 (04 Oct 2018 13:02) (114 - 114)  BP: 128/74 (04 Oct 2018 13:02) (128/74 - 128/74)  BP(mean): --  RR: 18 (04 Oct 2018 13:02) (18 - 18)  SpO2: 96% (04 Oct 2018 13:02) (96% - 96%)    PHYSICAL EXAM:    GENERAL: NAD, well-groomed, well-developed  HEAD:  Atraumatic, Normocephalic  EYES: EOMI, PERRLA, conjunctiva and sclera clear. Pale  ENMT: No tonsillar erythema, exudates, or enlargement; Moist mucous membranes, Good dentition, No lesions  NECK: Supple, No JVD, Normal thyroid  CHEST/LUNG: Clear to percussion bilaterally; No rales, rhonchi, wheezing, or rubs. Distended veins  HEART: Regular rate and rhythm; No murmurs, rubs, or gallops  ABDOMEN: Soft, Nontender, Nondistended; Bowel sounds present. Distended veins  RECTAL: No mass. No bleeding  EXTREMITIES:  2+ Peripheral Pulses, No clubbing, cyanosis, or edema  LYMPH: No lymphadenopathy noted  SKIN: No rashes or lesions      LABS:                        6.9    14.1  )-----------( 271      ( 03 Oct 2018 17:38 )             24.9                        RADIOLOGY & ADDITIONAL STUDIES: HPI:72 year old woman with history of breast cancer, metastatic, SVC syndrome, prior DVT, on xarelto, on new medication for metastatic breast cancer, with recurrent PICC, midline infection was noted to have non functional midline, removed yesterday and noted to have enterococccus bactermia and now with anemia and low ferritin. Follows up with Dr Galicia. No complaints of GI bleeding. No nausea, vomiting. No rectal bleeding. No fever. Sent for PRBC transfusion. GI evaluation for anemia and low ferritin. Patient had EGD and colonoscopy performed less than 1 year ago. No ulcers. 2 polyps on colonoscopy and diverticulosis.       PAST MEDICAL & SURGICAL HISTORY:  Essential hypertension  Lymphedema  PICC (peripherally inserted central catheter) in place  DVT of upper extremity (deep vein thrombosis): RIGHT  Breast cancer  S/P thoracentesis: 3/2016  S/P biopsy: R breast  PICC (peripherally inserted central catheter) in place      ROS:  No Heartburn, regurgitation, dysphagia, odynophagia.  No dyspepsia  No abdominal pain.    No Nausea, vomiting.  No Bleeding.  No hematemesis.   No diarrhea.    No hematochesia.  No weight loss, anorexia.  No edema.      MEDICATIONS  (STANDING):  vancomycin  IVPB 1000 milliGRAM(s) IV Intermittent once    MEDICATIONS  (PRN):      Allergies    No Known Allergies    Intolerances        SOCIAL HISTORY:Denies x 3    ENDOSCOPIC/GI HISTORY:EGD/Colonoscopy about 7-8 months ago. Colon polyps and diverticulosis    FAMILY HISTORY:  Family history of breast cancer in first degree relative (Sibling): s/p mastectomy  Family history of lung cancer  Family history of cancer of frontal sinus: followed by enucleation      Vital Signs Last 24 Hrs  T(C): 37 (04 Oct 2018 13:02), Max: 37 (04 Oct 2018 13:02)  T(F): 98.6 (04 Oct 2018 13:02), Max: 98.6 (04 Oct 2018 13:02)  HR: 114 (04 Oct 2018 13:02) (114 - 114)  BP: 128/74 (04 Oct 2018 13:02) (128/74 - 128/74)  BP(mean): --  RR: 18 (04 Oct 2018 13:02) (18 - 18)  SpO2: 96% (04 Oct 2018 13:02) (96% - 96%)    PHYSICAL EXAM:    GENERAL: NAD, well-groomed, well-developed  HEAD:  Atraumatic, Normocephalic  EYES: EOMI, PERRLA, conjunctiva and sclera clear. Pale  ENMT: No tonsillar erythema, exudates, or enlargement; Moist mucous membranes, Good dentition, No lesions  NECK: Supple, No JVD, Normal thyroid  CHEST/LUNG: Clear to percussion bilaterally; No rales, rhonchi, wheezing, or rubs. Distended veins  HEART: Regular rate and rhythm; systolic murmur at aortic site, rubs, or gallops  ABDOMEN: Soft, Nontender, Nondistended; Bowel sounds present. Distended veins  RECTAL: No mass. No bleeding  EXTREMITIES:  2+ Peripheral Pulses, No clubbing, cyanosis, or edema  LYMPH: No lymphadenopathy noted  SKIN: No rashes or lesions      LABS:                        6.9    14.1  )-----------( 271      ( 03 Oct 2018 17:38 )             24.9                        RADIOLOGY & ADDITIONAL STUDIES:

## 2018-10-04 NOTE — H&P ADULT - PMH
Breast cancer    DVT of upper extremity (deep vein thrombosis)  RIGHT  Essential hypertension    Lymphedema    PICC (peripherally inserted central catheter) in place Breast cancer    DVT of upper extremity (deep vein thrombosis)  RIGHT  Essential hypertension    Heart murmur    Lymphedema    PICC (peripherally inserted central catheter) in place

## 2018-10-05 ENCOUNTER — APPOINTMENT (OUTPATIENT)
Dept: MAMMOGRAPHY | Facility: CLINIC | Age: 73
End: 2018-10-05

## 2018-10-05 ENCOUNTER — APPOINTMENT (OUTPATIENT)
Dept: ULTRASOUND IMAGING | Facility: CLINIC | Age: 73
End: 2018-10-05

## 2018-10-05 LAB
ALBUMIN SERPL ELPH-MCNC: 3.3 G/DL — SIGNIFICANT CHANGE UP (ref 3.3–5.2)
ALP SERPL-CCNC: 68 U/L — SIGNIFICANT CHANGE UP (ref 40–120)
ALT FLD-CCNC: 20 U/L — SIGNIFICANT CHANGE UP
ANION GAP SERPL CALC-SCNC: 15 MMOL/L — SIGNIFICANT CHANGE UP (ref 5–17)
ANISOCYTOSIS BLD QL: SLIGHT — SIGNIFICANT CHANGE UP
APTT BLD: 29.1 SEC — SIGNIFICANT CHANGE UP (ref 27.5–37.4)
AST SERPL-CCNC: 14 U/L — SIGNIFICANT CHANGE UP
BILIRUB DIRECT SERPL-MCNC: 0.1 MG/DL — SIGNIFICANT CHANGE UP (ref 0–0.3)
BILIRUB INDIRECT FLD-MCNC: 0.6 MG/DL — SIGNIFICANT CHANGE UP (ref 0.2–1)
BILIRUB SERPL-MCNC: 0.7 MG/DL — SIGNIFICANT CHANGE UP (ref 0.4–2)
BUN SERPL-MCNC: 15 MG/DL — SIGNIFICANT CHANGE UP (ref 8–20)
CALCIUM SERPL-MCNC: 9.7 MG/DL — SIGNIFICANT CHANGE UP (ref 8.6–10.2)
CHLORIDE SERPL-SCNC: 101 MMOL/L — SIGNIFICANT CHANGE UP (ref 98–107)
CO2 SERPL-SCNC: 27 MMOL/L — SIGNIFICANT CHANGE UP (ref 22–29)
CREAT SERPL-MCNC: 0.61 MG/DL — SIGNIFICANT CHANGE UP (ref 0.5–1.3)
ELLIPTOCYTES BLD QL SMEAR: SLIGHT — SIGNIFICANT CHANGE UP
EOSINOPHIL # BLD AUTO: 0 K/UL — SIGNIFICANT CHANGE UP (ref 0–0.5)
EOSINOPHIL NFR BLD AUTO: 0.4 % — SIGNIFICANT CHANGE UP (ref 0–6)
GLUCOSE SERPL-MCNC: 97 MG/DL — SIGNIFICANT CHANGE UP (ref 70–115)
HCT VFR BLD CALC: 36.2 % — LOW (ref 37–47)
HGB BLD-MCNC: 10.8 G/DL — LOW (ref 12–16)
HYPOCHROMIA BLD QL: SLIGHT — SIGNIFICANT CHANGE UP
INR BLD: 1.01 RATIO — SIGNIFICANT CHANGE UP (ref 0.88–1.16)
LYMPHOCYTES # BLD AUTO: 1.1 K/UL — SIGNIFICANT CHANGE UP (ref 1–4.8)
LYMPHOCYTES # BLD AUTO: 9.9 % — LOW (ref 20–55)
MACROCYTES BLD QL: SLIGHT — SIGNIFICANT CHANGE UP
MCHC RBC-ENTMCNC: 26 PG — LOW (ref 27–31)
MCHC RBC-ENTMCNC: 29.8 G/DL — LOW (ref 32–36)
MCV RBC AUTO: 87.2 FL — SIGNIFICANT CHANGE UP (ref 81–99)
MICROCYTES BLD QL: SLIGHT — SIGNIFICANT CHANGE UP
MONOCYTES # BLD AUTO: 0.6 K/UL — SIGNIFICANT CHANGE UP (ref 0–0.8)
MONOCYTES NFR BLD AUTO: 5.8 % — SIGNIFICANT CHANGE UP (ref 3–10)
NEUTROPHILS # BLD AUTO: 9.2 K/UL — HIGH (ref 1.8–8)
NEUTROPHILS NFR BLD AUTO: 81.6 % — HIGH (ref 37–73)
OVALOCYTES BLD QL SMEAR: SLIGHT — SIGNIFICANT CHANGE UP
PLAT MORPH BLD: NORMAL — SIGNIFICANT CHANGE UP
PLATELET # BLD AUTO: 266 K/UL — SIGNIFICANT CHANGE UP (ref 150–400)
POIKILOCYTOSIS BLD QL AUTO: SLIGHT — SIGNIFICANT CHANGE UP
POTASSIUM SERPL-MCNC: 3.4 MMOL/L — LOW (ref 3.5–5.3)
POTASSIUM SERPL-SCNC: 3.4 MMOL/L — LOW (ref 3.5–5.3)
PROT SERPL-MCNC: 5.9 G/DL — LOW (ref 6.6–8.7)
PROTHROM AB SERPL-ACNC: 11.1 SEC — SIGNIFICANT CHANGE UP (ref 9.8–12.7)
RBC # BLD: 4.15 M/UL — LOW (ref 4.4–5.2)
RBC # FLD: 18.6 % — HIGH (ref 11–15.6)
RBC BLD AUTO: ABNORMAL
SODIUM SERPL-SCNC: 143 MMOL/L — SIGNIFICANT CHANGE UP (ref 135–145)
WBC # BLD: 11.3 K/UL — HIGH (ref 4.8–10.8)
WBC # FLD AUTO: 11.3 K/UL — HIGH (ref 4.8–10.8)

## 2018-10-05 PROCEDURE — 99233 SBSQ HOSP IP/OBS HIGH 50: CPT

## 2018-10-05 PROCEDURE — 93010 ELECTROCARDIOGRAM REPORT: CPT

## 2018-10-05 RX ORDER — CEFTRIAXONE 500 MG/1
INJECTION, POWDER, FOR SOLUTION INTRAMUSCULAR; INTRAVENOUS
Qty: 0 | Refills: 0 | Status: DISCONTINUED | OUTPATIENT
Start: 2018-10-05 | End: 2018-10-12

## 2018-10-05 RX ORDER — VANCOMYCIN HCL 1 G
1000 VIAL (EA) INTRAVENOUS EVERY 12 HOURS
Qty: 0 | Refills: 0 | Status: DISCONTINUED | OUTPATIENT
Start: 2018-10-05 | End: 2018-10-05

## 2018-10-05 RX ORDER — CEFTRIAXONE 500 MG/1
2 INJECTION, POWDER, FOR SOLUTION INTRAMUSCULAR; INTRAVENOUS ONCE
Qty: 0 | Refills: 0 | Status: COMPLETED | OUTPATIENT
Start: 2018-10-05 | End: 2018-10-05

## 2018-10-05 RX ORDER — ACETAMINOPHEN 500 MG
650 TABLET ORAL EVERY 6 HOURS
Qty: 0 | Refills: 0 | Status: DISCONTINUED | OUTPATIENT
Start: 2018-10-05 | End: 2018-10-12

## 2018-10-05 RX ORDER — POTASSIUM CHLORIDE 20 MEQ
40 PACKET (EA) ORAL ONCE
Qty: 0 | Refills: 0 | Status: COMPLETED | OUTPATIENT
Start: 2018-10-05 | End: 2018-10-05

## 2018-10-05 RX ORDER — AMPICILLIN TRIHYDRATE 250 MG
2 CAPSULE ORAL EVERY 4 HOURS
Qty: 0 | Refills: 0 | Status: DISCONTINUED | OUTPATIENT
Start: 2018-10-05 | End: 2018-10-12

## 2018-10-05 RX ORDER — CEFTRIAXONE 500 MG/1
2 INJECTION, POWDER, FOR SOLUTION INTRAMUSCULAR; INTRAVENOUS EVERY 12 HOURS
Qty: 0 | Refills: 0 | Status: DISCONTINUED | OUTPATIENT
Start: 2018-10-05 | End: 2018-10-12

## 2018-10-05 RX ORDER — AMPICILLIN TRIHYDRATE 250 MG
2 CAPSULE ORAL ONCE
Qty: 0 | Refills: 0 | Status: COMPLETED | OUTPATIENT
Start: 2018-10-05 | End: 2018-10-05

## 2018-10-05 RX ORDER — AMPICILLIN TRIHYDRATE 250 MG
CAPSULE ORAL
Qty: 0 | Refills: 0 | Status: DISCONTINUED | OUTPATIENT
Start: 2018-10-05 | End: 2018-10-12

## 2018-10-05 RX ADMIN — Medication 20 MILLIGRAM(S): at 08:56

## 2018-10-05 RX ADMIN — Medication 216 GRAM(S): at 14:24

## 2018-10-05 RX ADMIN — AMLODIPINE BESYLATE 5 MILLIGRAM(S): 2.5 TABLET ORAL at 11:30

## 2018-10-05 RX ADMIN — Medication 650 MILLIGRAM(S): at 23:00

## 2018-10-05 RX ADMIN — Medication 650 MILLIGRAM(S): at 23:43

## 2018-10-05 RX ADMIN — Medication 216 GRAM(S): at 11:30

## 2018-10-05 RX ADMIN — Medication 216 GRAM(S): at 23:42

## 2018-10-05 RX ADMIN — Medication 650 MILLIGRAM(S): at 14:25

## 2018-10-05 RX ADMIN — CEFTRIAXONE 100 GRAM(S): 500 INJECTION, POWDER, FOR SOLUTION INTRAMUSCULAR; INTRAVENOUS at 11:30

## 2018-10-05 RX ADMIN — Medication 40 MILLIEQUIVALENT(S): at 14:23

## 2018-10-05 RX ADMIN — Medication 216 GRAM(S): at 17:48

## 2018-10-05 RX ADMIN — CEFTRIAXONE 100 GRAM(S): 500 INJECTION, POWDER, FOR SOLUTION INTRAMUSCULAR; INTRAVENOUS at 23:43

## 2018-10-05 RX ADMIN — Medication 650 MILLIGRAM(S): at 14:24

## 2018-10-05 NOTE — PROGRESS NOTE ADULT - ASSESSMENT
This is 71 y/o woman with PMH of HER-2 positive breast cancer,  initial right breast involvement and also involvement of the left breast. Pt had a longstanding PICC line for infusion of recent chemotherapy of Poziotinib. On 9/7/18, there was an infection of the PICC line and it was removed.  She was given a course of Keflex and on 9/14/18, a midline was placed.  patient recently developed drainage under the dressing of the mid line. mid line was not working properly and  It was removed 10/3/18; Blood cultures x 2 sets were sent and 4 of 4 bottles are growing enterococcus. Of note, patient prior PICC tip grew out pan-susceptible Pseudomonas. Pt. reports that she was diagnosed with DVT in her RUE in 2015, was started on Xarelto about 8 months later on repeat doppler no blood clot was found but her oncologist kept her on xeralto. Pt was admitted for anemia, no obvious source of bleeding, s/p PRBC, seen by ID, Hematology and GI.    A/P    >Anemia No obvious source of bleeding  pt's anemia is likely of chronic disease,, no overt gi bleed.   s/p prbc transfusion, H&h improved  appreciate Gi input - At this time, I recommend consideration for stopping Xarelto- if it can be stopped. Can consider iron infusion/supplementation once possible.   No need of EGd/colonoscopy  She had EGD and colonoscopy performed less than 1 year ago.   Pt has h/o old CVA 2015 - on Xarelto - hold for now, will discuss with Dr. Galicia to see if we can stopped it.   will do iron, ferritin, should have done before PRBC    >Enterococcus bacteremia   appreciate ID input - stop Vancomycin, start AMPICILLIN 2 GRAMS Q4H,  CEFTRIAXONE 2 GRAMS Q12H   BOTH for empiric endocarditis coverage  - needs ALINE to r/o endocarditis - cardiology consulted     >Hypokalemia - supplemented, f/u BMP    >h/o breast cancer - appreciate hematology input - c/w medication as per hematology     >h/o DVT diagnosed in 2015   Xarelto on hold due to severe anemia - will discuss with hematology to see if we can stop Xarelto    >HTN - stable  c/w amlodipine     >DVT PPX - SCD This is 71 y/o woman with PMH of HER-2 positive breast cancer,  initial right breast involvement and also involvement of the left breast. Pt had a longstanding PICC line for infusion of recent chemotherapy of Poziotinib. On 9/7/18, there was an infection of the PICC line and it was removed.  She was given a course of Keflex and on 9/14/18, a midline was placed.  patient recently developed drainage under the dressing of the mid line. mid line was not working properly and  It was removed 10/3/18; Blood cultures x 2 sets were sent and 4 of 4 bottles are growing enterococcus. Of note, patient prior PICC tip grew out pan-susceptible Pseudomonas. Pt. reports that she was diagnosed with DVT in her RUE in 2015, was started on Xarelto about 8 months later on repeat doppler no blood clot was found but her oncologist kept her on xeralto. Pt was admitted for anemia, no obvious source of bleeding, s/p PRBC, seen by ID, Hematology and GI.    A/P    >Anemia No obvious source of bleeding  pt's anemia is likely of chronic disease,, no overt gi bleed.   s/p prbc transfusion, H&h improved  appreciate Gi input - At this time, I recommend consideration for stopping Xarelto- if it can be stopped. Can consider iron infusion/supplementation once possible.   No need of EGd/colonoscopy  She had EGD and colonoscopy performed less than 1 year ago.   Pt has h/o old CVA 2015 - on Xarelto - hold for now, will discuss with Dr. Galicia to see if we can stopped it.   will do iron, ferritin, should have done before PRBC    >Enterococcus bacteremia   appreciate ID input - stop Vancomycin, start AMPICILLIN 2 GRAMS Q4H,  CEFTRIAXONE 2 GRAMS Q12H   BOTH for empiric endocarditis coverage  needs ALINE to r/o endocarditis - cardiology consulted   f/u blood c/s     >Hypokalemia - supplemented, f/u BMP    >h/o breast cancer - appreciate hematology input - c/w medication as per hematology     >h/o DVT diagnosed in 2015   Xarelto on hold due to severe anemia - will discuss with hematology to see if we can stop Xarelto    >HTN - stable  c/w amlodipine     >DVT PPX - SCD This is 71 y/o woman with PMH of HER-2 positive breast cancer,  initial right breast involvement and also involvement of the left breast. Pt had a longstanding PICC line for infusion of recent chemotherapy of Poziotinib. On 9/7/18, there was an infection of the PICC line and it was removed.  She was given a course of Keflex and on 9/14/18, a midline was placed.  patient recently developed drainage under the dressing of the mid line. mid line was not working properly and  It was removed 10/3/18; Blood cultures x 2 sets were sent and 4 of 4 bottles are growing enterococcus. Of note, patient prior PICC tip grew out pan-susceptible Pseudomonas. Pt. reports that she was diagnosed with DVT in her RUE in 2015, was started on Xarelto about 8 months later on repeat doppler no blood clot was found but her oncologist kept her on xeralto. Pt was admitted for anemia, no obvious source of bleeding, s/p PRBC, seen by ID, Hematology and GI.    A/P    >Anemia No obvious source of bleeding  pt's anemia is likely of chronic disease,, no overt gi bleed.   s/p prbc transfusion, H&h improved  appreciate Gi input - At this time, I recommend consideration for stopping Xarelto- if it can be stopped. Can consider iron infusion/supplementation once possible.   No need of EGd/colonoscopy  She had EGD and colonoscopy performed less than 1 year ago.   Pt has h/o old DVT 2015 - on Xarelto - hold for now, will discuss with Dr. Galicia to see if we can stopped it.   will do iron, ferritin, should have done before PRBC    >Enterococcus bacteremia   appreciate ID input - stop Vancomycin, start AMPICILLIN 2 GRAMS Q4H,  CEFTRIAXONE 2 GRAMS Q12H   BOTH for empiric endocarditis coverage  needs ALINE to r/o endocarditis - cardiology consulted   f/u blood c/s     >Hypokalemia - supplemented, f/u BMP    >h/o breast cancer - appreciate hematology input - c/w medication as per hematology     >h/o DVT diagnosed in 2015   Xarelto on hold due to severe anemia - will discuss with hematology to see if we can stop Xarelto    >HTN - stable  c/w amlodipine     >DVT PPX - SCD

## 2018-10-05 NOTE — PROGRESS NOTE ADULT - SUBJECTIVE AND OBJECTIVE BOX
[facility]  INFECTIOUS DISEASES AND INTERNAL MEDICINE at [place]  =======================================================  Murray Duncan MD  Diplomates American Board of Internal Medicine and Infectious Diseases  =======================================================    DOMINIK PARKS [id number]  chief complaint: follow up on Enterococcus bacteremia  patient seen and examined in follow up.  Chart and labs reviewed.     repeat blood cultures on admission positive this AM.  3 of 4 bottles so far.   Echo done overnight.  TTE without obvious vegetations  Enterococcus faecalis identified this AM.     =======================================================  Allergies:  No Known Allergies      =======================================================  Antibiotics:  vancomycin  IVPB 1000 milliGRAM(s) IV Intermittent every 12 hours    Other medications:  amLODIPine   Tablet 5 milliGRAM(s) Oral daily       =======================================================     REVIEW OF SYSTEMS:  as above  all other ROS are negative    =======================================================    Physical Exam:  T(F): 98.2 (05 Oct 2018 07:51), Max: 98.6 (04 Oct 2018 13:02)  HR: 77 (05 Oct 2018 07:51)  BP: 132/83 (05 Oct 2018 07:51)  RR: 18 (05 Oct 2018 07:51)  SpO2: 99% (05 Oct 2018 07:51) (96% - 100%)    General:  No acute distress.  Eye: Pupils are equal, round and reactive to light, Extraocular movements are intact, Normal conjunctiva.  HENT: Normocephalic, Oral mucosa is moist, No pharyngeal erythema, No sinus tenderness.  Neck: Supple, No lymphadenopathy.  Respiratory: Lungs are clear to auscultation, Respirations are non-labored.  Cardiovascular: Normal rate, Regular rhythm, No murmur, Good pulses equal in all extremities,  Right UPPER ext with compression hosiery.   Gastrointestinal: Soft, Non-tender, Non-distended, Normal bowel sounds.  Genitourinary: No costovertebral angle tenderness.  Lymphatics: No lymphadenopathy neck,   Musculoskeletal: Normal range of motion, Normal strength.  Integumentary: No rash.  Neurologic: Alert, Oriented, No focal deficits, Cranial Nerves II-XII are grossly intact.  Psychiatric: Appropriate mood & affect.    =======================================================  Labs:                        10.8   11.3  )-----------( 266      ( 05 Oct 2018 10:18 )             36.2     10-04    140  |  103  |  21.0<H>  ----------------------------<  145<H>  4.6   |  27.0  |  0.61    Ca    9.5      04 Oct 2018 17:12          Culture - Blood (collected 10-04-18 @ 17:14)  Source: .Blood Blood    Culture - Blood (collected 10-04-18 @ 17:13)  Source: .Blood Blood    Culture - Blood (collected 10-03-18 @ 17:38)  Source: .Blood  Final Report (10-05-18 @ 09:44):    Growth in aerobic and anaerobic bottles: Enterococcus faecalis    Aerobic Bottle: 13:10 Hours to positivity    Anaerobic Bottle: 13:50 Hours to positivity    .    TYPE: (C=Critical, N=Notification, A=Abnormal) C    TESTS:  _ Bld gs    DATE/TIME CALLED: _ 10/04/2018 10:31:22    CALLED TO: Alonzo SMITH ( Nurse/ Dr Temple Office)    READ BACK (2 Patient Identifiers)(Y/N): _ Y    READ BACK VALUES (Y/N): _ Y    CALLED BY: Alonzo rosario  Organism: Enterococcus faecalis (10-05-18 @ 09:44)  Organism: Enterococcus faecalis (10-05-18 @ 09:44)    Sensitivities:      -  Ampicillin: S <=2 Predicts results to ampicillin/sulbactam, amoxacillin-clavulanate and  piperacillin-tazobactam.      -  Gentamicin synergy: S      -  Streptomycin synergy: S      -  Vancomycin: S 2      Method Type: JUSTICE    Culture - Blood (collected 10-03-18 @ 17:38)  Source: .Blood  Final Report (10-05-18 @ 09:45):    Growth in aerobic and anaerobic bottles: Enterococcus faecalis    Aerobic Bottle: 13:20 Hours to positivity    Anaerobic Bottle: 12:20 Hours to positivity    .    ***Blood Panel PCR results on this specimen are available    approximately 3 hours after the Gram stain result.***    Gram stain, PCR, and/or culture results may not always    correspond due to difference in methodologies.    ************************************************************    This PCR assay was performed using [employer].    The following targets are tested for: Enterococcus,    vancomycin resistant enterococci, Listeria monocytogenes,    coagulase negative staphylococci, S. aureus,    methicillin resistant S. aureus, Streptococcus agalactiae    (Group B), S. pneumoniae, S. pyogenes (Group A),    Acinetobacter baumannii, Enterobacter cloacae, E. coli,    Klebsiella oxytoca, K. pneumoniae, Proteus sp.,    Serratia marcescens, Haemophilus influenzae,    Neisseria meningitidis, Pseudomonas aeruginosa, Candida    albicans, C. glabrata, C krusei, C parapsilosis,    C. tropicalis and the KPC resistance gene.    "Due to technical problems, Proteus sp. will Not be reported as part of    the BCID panel until further notice"    .    TYPE: (C=Critical, N=Notification, A=Abnormal) C    TESTS:  _ Bld GS    DATE/TIME CALLED: _ 10/04/2018 10:27:10    CALLED TO: Alonzo SMITH (Nurse/ Dr. Temple office)    READ BACK (2 Patient Identifiers)(Y/N): _ Y    READ BACK VALUES (Y/N): _ Y    CALLED BY: Alonzo rosario  Organism: Enterococcus faecalis  Blood Culture PCR (10-05-18 @ 09:45)  Organism: Blood Culture PCR (10-05-18 @ 09:45)    Sensitivities:      -  Enterococcus species: Detec      Method Type: PCR  Organism: Enterococcus faecalis (10-05-18 @ 09:45)    Sensitivities:      -  Ampicillin: S <=2 Predicts results to ampicillin/sulbactam, amoxacillin-clavulanate and  piperacillin-tazobactam.      -  Gentamicin synergy: S      -  Streptomycin synergy: S      -  Vancomycin: S 2      Method Type: JUSTICE        =============    EXAM:  ECHO TRANSTHORACIC COMP W DOPP      PROCEDURE DATE:  Oct  4 2018   .      INTERPRETATION:  REPORT:    TRANSTHORACIC ECHOCARDIOGRAM REPORT         Patient Name:   DOMINIK PARKS Patient Location: [facility] Rec #:  [id number]     Accession #:      [id number]  Account #:                   Height:           65.7 in 167.0 cm  YOB: 1945    Weight:           141.1 lb 64.00 kg  Patient Age:    72 years     BSA:              1.72 m²  Patient Gender: F            BP:               128/74 mmHg       Date of Exam:        10/4/2018 7:20:38 PM  Sonographer:         Jaziel aRo  Referring Physician: Jonatan Chu MD    Procedure:     2D Echo/Doppler/Color Doppler Complete.  Indications:   Acute and subacute infective endocarditis - I33.0  Diagnosis:     Acute and subacute infective endocarditis - I33.0  Study Details: Technically fair study. Study quality was adversely   affected due                 to patient obesity and lung interference.         2D AND M-MODE MEASUREMENTS (normal ranges within parentheses):  Left                Normal    Aorta/Left           Normal  Ventricle:                    Atrium:  IVSd (2D):    0.96  (0.7-1.1) Aortic Root  2.80 cm (2.4-3.7)                cm              (2D):  LVPWd (2D):   0.86  (0.7-1.1) Left Atrium  2.75 cm (1.9-4.0)                cm              (2D):  LVIDd (2D):   4.22  (3.4-5.7) LA Volume    20.1                cm              Index        ml/m²  LVIDs (2D):   2.44            Right Ventricle:                cm              RVd (2D):        2.40 cm  LV FS (2D):   42.2  (>25%)    TAPSE:           1.79 cm                %  LV EF (2D):   74 %  (>55%)  Relative Wall 0.41  (<0.42)  Thickness    LV SYSTOLIC FUNCTION BY 2D PLANIMETRY (MOD):  EF-A4C View: 66.7 % EF-A2C View: 65.0 % EF-Biplane: 65 %    LV DIASTOLIC FUNCTION:  MV Peak E: 0.77 m/s e', MV Jessie: 0.07 m/s  MV Peak A: 0.82 m/s E/e' Ratio: 11.50  E/A Ratio: 0.95     Decel Time: 167 msec    SPECTRAL DOPPLER ANALYSIS (where applicable):  Mitral Valve:  MV P1/2 Time: 48.50 msec  MV Area, PHT: 4.54 cm²    Aortic Valve: AoV Max Feliciano: 1.68 m/s AoV Peak P.3 mmHg AoV Mean P.0 mmHg    LVOT Vmax: 1.03 m/s LVOT VTI: 0.213 m LVOT Diameter: 1.92 cm    AoV Area, Vmax: 1.78 cm² AoV Area, VTI: 1.85 cm² AoV Area, Vmn: 1.80 cm²  Ao VTI: 0.332  Aortic Insufficiency:  AI Half-time: 725 msec    Tricuspid Valve and PA/RV Systolic Pressure: TR Max Velocity: 1.89 m/s RA   Pressure: 3 mmHg RVSP/PASP: 17.3 mmHg       PHYSICIAN INTERPRETATION:  Left Ventricle: The left ventricular internal cavity size is normal. Left   ventricular wall thickness is normal.  Global LV systolic function was normal. Left ventricular ejection   fraction, by visual estimation, is 65 to 70%. Normal segmental left   ventricular systolic function.  Right Ventricle: Normal right ventricular size and function. TV S' 0.2   m/s.  Left Atrium: The left atrium is normal in size.  Right Atrium: The right atrium is normal in size.  Pericardium: There is no evidence of pericardial effusion.  Mitral Valve: The mitral valve is normal in structure. Mitral leaflet   mobility is normal. There is mild mitral annular calcification. Trace   mitral valve regurgitation is seen.  Tricuspid Valve: Structurally normal tricuspid valve, with normal leaflet   excursion. Trivial tricuspid regurgitation is visualized.  Aortic Valve: The aortic valve is trileaflet. Peak Av velocity is 1.68   m/s, mean transaortic gradient equals 6.0 mmHg, the calculated aortic   valve area equals 1.85 cm² by the continuity equation consistent with   mild aortic stenosis. Mild aortic valve regurgitation is seen.  Pulmonic Valve: Structurally normal pulmonic valve, with normal leaflet   excursion. Trace pulmonic valve regurgitation.  Aorta: The aortic root and ascending aorta are structurally normal, with   no evidence of dilitation.  Pulmonary Artery: The main pulmonary artery is normal in size.  Venous: The pulmonary veins appear normal. The inferior vena cava was   normal sized, with respiratory size variation greater than 50%.       Summary:   1. Left ventricular ejection fraction, by visual estimation, is 65 to   70%.   2. Normal global left ventricular systolic function.   3. Normal right ventricular size and function.   4. There is no evidence of pericardial effusion.   5. Mild mitral annular calcification.   6. Trace tricuspid regurgitation.   7. Mild aortic regurgitation.   8. Trace pulmonic valve regurgitation.    [id number] Carlos Diaz MD, Electronically signed on 10/5/2018 at 8:29:41 AM              *** Final ***                  CARLOS DIAZ MD  This document has been electronically signed. Oct  4 2018  7:20PM

## 2018-10-05 NOTE — PROGRESS NOTE ADULT - ASSESSMENT
This 72 y.o. F with breast cancer undergoing chemotherapy, prior PICC line infection, and now here with mid line infection, s/p removal, blood cultures with Enterococcus faecalis bacteremia.     - repeat cultures in ER 10/4/18 positive  - repeat cultures x 2 sets ordered for this AM.     Enterococcus SS to all tested agents  - stop Vancomycin  - start AMPICILLIN 2 GRAMS Q4H   - START CEFTRIAXONE 2 GRAMS Q12H   BOTH for empiric endocarditis coverage  - needs ALINE to r/o endocarditis      will trend WBC   monitor for fevers

## 2018-10-05 NOTE — CONSULT NOTE ADULT - SUBJECTIVE AND OBJECTIVE BOX
[facility]                                   [address] [address]                                                      PHONE: [phone]                                                         FAX: [phone]  ----------------------------------------------------------------------------------------------------    72y Female with past medical history as under with recurrent PICC, midline infection was noted to have non functional midline, removed and noted to have enterococccus bactermia and now with anemia and low ferritin. ALINE needed to r/o endocarditis. No prior cardiac history as per pt. At the time of evaluation, pt reports feeling well.     PAST MEDICAL & SURGICAL HISTORY:  Heart murmur  Essential hypertension  Lymphedema  PICC (peripherally inserted central catheter) in place  DVT of upper extremity (deep vein thrombosis): RIGHT  Breast cancer  S/P thoracentesis: 3/2016  S/P biopsy: R breast  PICC (peripherally inserted central catheter) in place      No Known Allergies      Review of Systems:  Positive for wound drainage  Rest of the systems were reviewed and was negative.     Family history:   No pertinent family history in first degree relative of early CAD, sudden cardiac death or  congenital heart disease    Social History:  No smoking   No alcohol  No other drug use    Vital Signs Last 24 Hrs  T(C): 36.8 (05 Oct 2018 07:51), Max: 37 (04 Oct 2018 13:02)  T(F): 98.2 (05 Oct 2018 07:51), Max: 98.6 (04 Oct 2018 13:02)  HR: 77 (05 Oct 2018 07:51) (70 - 114)  BP: 132/83 (05 Oct 2018 07:51) (120/64 - 144/82)  BP(mean): --  RR: 18 (05 Oct 2018 07:51) (18 - 18)  SpO2: 99% (05 Oct 2018 07:51) (96% - 100%)    PHYSICAL EXAM:  Constitutional: Appears well developed, well nourished; alert and co-operative  HEENT:     Head: Normocephalic and atraumatic  Eyes: Conjunctiva normal, No scleral icterus  Neck: Supple, No JVD  Mouth/Throat: Mucous membranes are normal. Mucosa are not pale or dry.  Cardiovascular: Normal S1 S2, + PSM  Respiratory: Lungs clear to auscultation; no crepitations, no wheeze  Gastrointestinal:  Soft, Non-tender, + BS	  Musculoskeletal: Normal range of motion. No edema  Skin: Warm and dry. No cyanosis . No diaphoresis.  Neurologic: Alert oriented to time place and person. Normal strength and no tremor.  Psychiatric: Normal mood and affect, Speech is normal and behavior is normal.        LABS:                        10.8   11.3  )-----------( 266      ( 05 Oct 2018 10:18 )             36.2     10-05    143  |  101  |  15.0  ----------------------------<  97  3.4<L>   |  27.0  |  0.61    Ca    9.7      05 Oct 2018 10:17    PT/INR - ( 05 Oct 2018 10:18 )   PT: 11.1 sec;   INR: 1.01 ratio         PTT - ( 05 Oct 2018 10:18 )  PTT:29.1 sec    RADIOLOGY & ADDITIONAL STUDIES: (reviewed)    CARDIOLOGY TESTING:(reviewed)     ECHOCARDIOGRAM:  < from: TTE Echo Complete w/Doppler (10.04.18 @ 19:20) >  PHYSICIAN INTERPRETATION:  Left Ventricle: Theleft ventricular internal cavity size is normal. Left   ventricular wall thickness is normal.  Global LV systolic function was normal. Left ventricular ejection   fraction, by visual estimation, is 65 to 70%. Normal segmental left   ventricular systolic function.  Right Ventricle: Normal right ventricular size and function. TV S' 0.2   m/s.  Left Atrium: The left atrium is normal in size.  Right Atrium: The right atrium is normal in size.  Pericardium: There is no evidence of pericardial effusion.  Mitral Valve: The mitral valve is normal in structure. Mitral leaflet   mobility is normal. There is mild mitral annular calcification. Trace   mitral valve regurgitation is seen.  Tricuspid Valve: Structurally normal tricuspid valve, with normal leaflet   excursion. Trivial tricuspid regurgitation is visualized.  Aortic Valve: The aortic valve is trileaflet. Peak Av velocity is 1.68   m/s, mean transaortic gradient equals 6.0 mmHg, the calculated aortic   valve area equals 1.85 cm² by the continuity equation consistent with   mild aortic stenosis. Mild aortic valve regurgitation is seen.  Pulmonic Valve: Structurally normal pulmonic valve, with normal leaflet   excursion. Trace pulmonic valve regurgitation.  Aorta: The aortic root and ascending aorta are structurally normal, with   no evidence of dilitation.  Pulmonary Artery: The main pulmonary artery is normal in size.  Venous: The pulmonary veins appear normal. The inferior vena cava was   normal sized, with respiratory size variation greater than 50%.       Summary:   1. Left ventricular ejection fraction, by visual estimation, is 65 to   70%.   2. Normal global left ventricular systolic function.   3. Normal right ventricular size and function.   4. There is no evidence of pericardial effusion.   5. Mild mitral annular calcification.   6. Trace tricuspid regurgitation.   7. Mild aortic regurgitation.   8. Trace pulmonic valve regurgitation.    [id number] Kristofer Florez MD, Electronically signed on 10/5/2018 at 8:29:41 AM    < end of copied text >    MEDICATIONS:(reviewed)  MEDICATIONS  (STANDING):  amLODIPine   Tablet 5 milliGRAM(s) Oral daily  ampicillin  IVPB      ampicillin  IVPB 2 Gram(s) IV Intermittent every 4 hours  cefTRIAXone   IVPB 2 Gram(s) IV Intermittent every 12 hours  cefTRIAXone   IVPB      potassium chloride    Tablet ER 40 milliEquivalent(s) Oral once    MEDICATIONS  (PRN):  acetaminophen   Tablet .. 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  She had a longstanding PICC line for infusion of recent chemotherapy of Poziotinib, as part of the [id number] study.  on 9/7/18, there was an infection of the PICC line and it was removed.  She was given a course of Keflex and on 9/14/18, a midline was placed. She recently developed drainage under the dressing of the mid line. Also noted was that the midline was flushing but not drawing.  Now with Enterococcal bacteremia    Plan for ALINE on Monday as requested by ID  Would check with GI that there are no contraindications to proceeding. As per GI note, pt did have endoscopy last year  Antibiotics as per ID  NPO past midnite on Sunday    Plan discussed with Medicine Team

## 2018-10-05 NOTE — PROGRESS NOTE ADULT - SUBJECTIVE AND OBJECTIVE BOX
Internal Medicine Hospitalist - Dr. Damien PARKS    [id number]    72y      Female    Patient is a 72y old  Female who presents with a chief complaint of abnormal labs (05 Oct 2018 12:01)    INTERVAL HPI/ OVERNIGHT EVENTS: Patient is seen and examined, denied chest pain, SOB, abd. pain, nausea and vomiting.     REVIEW OF SYSTEMS:    Denied fever, chills, abd. pain, nausea, vomiting, chest pain, SOB, headache, dizziness    PHYSICAL EXAM:    Vital Signs Last 24 Hrs  T(C): 36.8 (05 Oct 2018 07:51), Max: 36.8 (05 Oct 2018 04:06)  T(F): 98.2 (05 Oct 2018 07:51), Max: 98.3 (05 Oct 2018 04:06)  HR: 77 (05 Oct 2018 07:51) (70 - 98)  BP: 132/83 (05 Oct 2018 07:51) (120/64 - 144/82)  BP(mean): --  RR: 18 (05 Oct 2018 07:51) (18 - 18)  SpO2: 99% (05 Oct 2018 07:51) (98% - 100%)    GENERAL: NAD  HEENT: EOMI  Neck: supple  CHEST/LUNG: CTA b/l   HEART: S1S2+ audible  ABDOMEN: Soft, Nontender, Nondistended; Bowel sounds present  EXTREMITIES:  no edema  CNS: AAO X 3  Psychiatry: normal mood    LABS:                        10.8   11.3  )-----------( 266      ( 05 Oct 2018 10:18 )             36.2     10-05    143  |  101  |  15.0  ----------------------------<  97  3.4<L>   |  27.0  |  0.61    Ca    9.7      05 Oct 2018 10:17    TPro  5.9<L>  /  Alb  3.3  /  TBili  0.7  /  DBili  0.1  /  AST  14  /  ALT  20  /  AlkPhos  68  10-05    PT/INR - ( 05 Oct 2018 10:18 )   PT: 11.1 sec;   INR: 1.01 ratio         PTT - ( 05 Oct 2018 10:18 )  PTT:29.1 sec        MEDICATIONS  (STANDING):  amLODIPine   Tablet 5 milliGRAM(s) Oral daily  ampicillin  IVPB      ampicillin  IVPB 2 Gram(s) IV Intermittent every 4 hours  cefTRIAXone   IVPB 2 Gram(s) IV Intermittent every 12 hours  cefTRIAXone   IVPB      potassium chloride    Tablet ER 40 milliEquivalent(s) Oral once    MEDICATIONS  (PRN):  acetaminophen   Tablet .. 650 milliGRAM(s) Oral every 6 hours PRN Moderate Pain (4 - 6)      RADIOLOGY & ADDITIONAL TEST

## 2018-10-06 LAB
-  AMPICILLIN: SIGNIFICANT CHANGE UP
-  AMPICILLIN: SIGNIFICANT CHANGE UP
-  GENTAMICIN SYNERGY: SIGNIFICANT CHANGE UP
-  GENTAMICIN SYNERGY: SIGNIFICANT CHANGE UP
-  STREPTOMYCIN SYNERGY: SIGNIFICANT CHANGE UP
-  STREPTOMYCIN SYNERGY: SIGNIFICANT CHANGE UP
-  VANCOMYCIN: SIGNIFICANT CHANGE UP
-  VANCOMYCIN: SIGNIFICANT CHANGE UP
ANION GAP SERPL CALC-SCNC: 9 MMOL/L — SIGNIFICANT CHANGE UP (ref 5–17)
BUN SERPL-MCNC: 14 MG/DL — SIGNIFICANT CHANGE UP (ref 8–20)
CALCIUM SERPL-MCNC: 9.1 MG/DL — SIGNIFICANT CHANGE UP (ref 8.6–10.2)
CHLORIDE SERPL-SCNC: 104 MMOL/L — SIGNIFICANT CHANGE UP (ref 98–107)
CO2 SERPL-SCNC: 28 MMOL/L — SIGNIFICANT CHANGE UP (ref 22–29)
CREAT SERPL-MCNC: 0.49 MG/DL — LOW (ref 0.5–1.3)
CULTURE RESULTS: SIGNIFICANT CHANGE UP
CULTURE RESULTS: SIGNIFICANT CHANGE UP
GLUCOSE SERPL-MCNC: 92 MG/DL — SIGNIFICANT CHANGE UP (ref 70–115)
HCT VFR BLD CALC: 29.7 % — LOW (ref 37–47)
HGB BLD-MCNC: 8.6 G/DL — LOW (ref 12–16)
MAGNESIUM SERPL-MCNC: 1.4 MG/DL — LOW (ref 1.6–2.6)
MCHC RBC-ENTMCNC: 25.2 PG — LOW (ref 27–31)
MCHC RBC-ENTMCNC: 29 G/DL — LOW (ref 32–36)
MCV RBC AUTO: 87.1 FL — SIGNIFICANT CHANGE UP (ref 81–99)
METHOD TYPE: SIGNIFICANT CHANGE UP
METHOD TYPE: SIGNIFICANT CHANGE UP
ORGANISM # SPEC MICROSCOPIC CNT: SIGNIFICANT CHANGE UP
PLATELET # BLD AUTO: 163 K/UL — SIGNIFICANT CHANGE UP (ref 150–400)
POTASSIUM SERPL-MCNC: 5.1 MMOL/L — SIGNIFICANT CHANGE UP (ref 3.5–5.3)
POTASSIUM SERPL-SCNC: 5.1 MMOL/L — SIGNIFICANT CHANGE UP (ref 3.5–5.3)
RBC # BLD: 3.41 M/UL — LOW (ref 4.4–5.2)
RBC # FLD: 18.7 % — HIGH (ref 11–15.6)
SODIUM SERPL-SCNC: 141 MMOL/L — SIGNIFICANT CHANGE UP (ref 135–145)
SPECIMEN SOURCE: SIGNIFICANT CHANGE UP
SPECIMEN SOURCE: SIGNIFICANT CHANGE UP
WBC # BLD: 7.7 K/UL — SIGNIFICANT CHANGE UP (ref 4.8–10.8)
WBC # FLD AUTO: 7.7 K/UL — SIGNIFICANT CHANGE UP (ref 4.8–10.8)

## 2018-10-06 PROCEDURE — 99233 SBSQ HOSP IP/OBS HIGH 50: CPT

## 2018-10-06 RX ORDER — MAGNESIUM SULFATE 500 MG/ML
2 VIAL (ML) INJECTION ONCE
Qty: 0 | Refills: 0 | Status: COMPLETED | OUTPATIENT
Start: 2018-10-06 | End: 2018-10-06

## 2018-10-06 RX ADMIN — Medication 216 GRAM(S): at 18:30

## 2018-10-06 RX ADMIN — Medication 216 GRAM(S): at 09:32

## 2018-10-06 RX ADMIN — Medication 50 GRAM(S): at 13:45

## 2018-10-06 RX ADMIN — Medication 216 GRAM(S): at 23:38

## 2018-10-06 RX ADMIN — Medication 650 MILLIGRAM(S): at 23:41

## 2018-10-06 RX ADMIN — Medication 216 GRAM(S): at 14:45

## 2018-10-06 RX ADMIN — CEFTRIAXONE 100 GRAM(S): 500 INJECTION, POWDER, FOR SOLUTION INTRAMUSCULAR; INTRAVENOUS at 12:48

## 2018-10-06 RX ADMIN — AMLODIPINE BESYLATE 5 MILLIGRAM(S): 2.5 TABLET ORAL at 13:11

## 2018-10-06 RX ADMIN — Medication 216 GRAM(S): at 05:41

## 2018-10-06 RX ADMIN — Medication 216 GRAM(S): at 02:27

## 2018-10-06 NOTE — PROGRESS NOTE ADULT - SUBJECTIVE AND OBJECTIVE BOX
Internal Medicine Hospitalist - Dr. Damien PARKS    [id number]    72y      Female    Patient is a 72y old  Female who presents with a chief complaint of abnormal labs (05 Oct 2018 13:38)    INTERVAL HPI/ OVERNIGHT EVENTS: Patient is seen and examined, denied chest pain, SOB, fever, chills    REVIEW OF SYSTEMS:    Denied fever, chills, abd. pain, nausea, vomiting, chest pain, SOB, headache, dizziness    PHYSICAL EXAM:    Vital Signs Last 24 Hrs  T(C): 36.8 (06 Oct 2018 09:32), Max: 36.8 (05 Oct 2018 15:13)  T(F): 98.2 (06 Oct 2018 09:32), Max: 98.3 (05 Oct 2018 15:13)  HR: 97 (06 Oct 2018 09:32) (77 - 97)  BP: 135/61 (06 Oct 2018 09:32) (108/71 - 135/61)  BP(mean): 71 (05 Oct 2018 15:13) (71 - 71)  RR: 20 (06 Oct 2018 09:32) (18 - 20)  SpO2: 100% (06 Oct 2018 09:32) (95% - 100%)    GENERAL: NAD  CHEST/LUNG: CTA b/l   HEART: S1S2+ audible  ABDOMEN: Soft, Nontender, Nondistended; Bowel sounds present  EXTREMITIES:  no edema  CNS: AAO X 3      LABS:                        8.6    7.7   )-----------( 163      ( 06 Oct 2018 07:50 )             29.7     10-06    141  |  104  |  14.0  ----------------------------<  92  5.1   |  28.0  |  0.49<L>    Ca    9.1      06 Oct 2018 07:50  Mg     1.4     10-06    TPro  5.9<L>  /  Alb  3.3  /  TBili  0.7  /  DBili  0.1  /  AST  14  /  ALT  20  /  AlkPhos  68  10-05    PT/INR - ( 05 Oct 2018 10:18 )   PT: 11.1 sec;   INR: 1.01 ratio         PTT - ( 05 Oct 2018 10:18 )  PTT:29.1 sec        MEDICATIONS  (STANDING):  amLODIPine   Tablet 5 milliGRAM(s) Oral daily  ampicillin  IVPB      ampicillin  IVPB 2 Gram(s) IV Intermittent every 4 hours  cefTRIAXone   IVPB 2 Gram(s) IV Intermittent every 12 hours  cefTRIAXone   IVPB      magnesium sulfate  IVPB 2 Gram(s) IV Intermittent once    MEDICATIONS  (PRN):  acetaminophen   Tablet .. 650 milliGRAM(s) Oral every 6 hours PRN Moderate Pain (4 - 6)      RADIOLOGY & ADDITIONAL TEST    < from: TTE Echo Complete w/Doppler (10.04.18 @ 19:20) >  Summary:   1. Left ventricular ejection fraction, by visual estimation, is 65 to   70%.   2. Normal global left ventricular systolic function.   3. Normal right ventricular size and function.   4. There is no evidence of pericardial effusion.   5. Mild mitral annular calcification.   6. Trace tricuspid regurgitation.   7. Mild aortic regurgitation.   8. Trace pulmonic valve regurgitation.    < end of copied text > Internal Medicine Hospitalist - Dr. Damien PARKS    [id number]    72y      Female    Patient is a 72y old  Female who presents with a chief complaint of abnormal labs (05 Oct 2018 13:38)    INTERVAL HPI/ OVERNIGHT EVENTS: Patient is seen and examined, denied chest pain, SOB, fever, chills    REVIEW OF SYSTEMS:    Denied fever, chills, abd. pain, nausea, vomiting, chest pain, SOB, headache, dizziness    PHYSICAL EXAM:    Vital Signs Last 24 Hrs  T(C): 36.8 (06 Oct 2018 09:32), Max: 36.8 (05 Oct 2018 15:13)  T(F): 98.2 (06 Oct 2018 09:32), Max: 98.3 (05 Oct 2018 15:13)  HR: 97 (06 Oct 2018 09:32) (77 - 97)  BP: 135/61 (06 Oct 2018 09:32) (108/71 - 135/61)  BP(mean): 71 (05 Oct 2018 15:13) (71 - 71)  RR: 20 (06 Oct 2018 09:32) (18 - 20)  SpO2: 100% (06 Oct 2018 09:32) (95% - 100%)    GENERAL: NAD  HEENt - EOMI  CHEST/LUNG: CTA b/l   HEART: S1S2+ audible  ABDOMEN: Soft, Nontender, Nondistended; Bowel sounds present  EXTREMITIES:  no edema  CNS: AAO X 3  psychiatry - normal      LABS:                        8.6    7.7   )-----------( 163      ( 06 Oct 2018 07:50 )             29.7     10-06    141  |  104  |  14.0  ----------------------------<  92  5.1   |  28.0  |  0.49<L>    Ca    9.1      06 Oct 2018 07:50  Mg     1.4     10-06    TPro  5.9<L>  /  Alb  3.3  /  TBili  0.7  /  DBili  0.1  /  AST  14  /  ALT  20  /  AlkPhos  68  10-05    PT/INR - ( 05 Oct 2018 10:18 )   PT: 11.1 sec;   INR: 1.01 ratio         PTT - ( 05 Oct 2018 10:18 )  PTT:29.1 sec        MEDICATIONS  (STANDING):  amLODIPine   Tablet 5 milliGRAM(s) Oral daily  ampicillin  IVPB      ampicillin  IVPB 2 Gram(s) IV Intermittent every 4 hours  cefTRIAXone   IVPB 2 Gram(s) IV Intermittent every 12 hours  cefTRIAXone   IVPB      magnesium sulfate  IVPB 2 Gram(s) IV Intermittent once    MEDICATIONS  (PRN):  acetaminophen   Tablet .. 650 milliGRAM(s) Oral every 6 hours PRN Moderate Pain (4 - 6)      RADIOLOGY & ADDITIONAL TEST    < from: TTE Echo Complete w/Doppler (10.04.18 @ 19:20) >  Summary:   1. Left ventricular ejection fraction, by visual estimation, is 65 to   70%.   2. Normal global left ventricular systolic function.   3. Normal right ventricular size and function.   4. There is no evidence of pericardial effusion.   5. Mild mitral annular calcification.   6. Trace tricuspid regurgitation.   7. Mild aortic regurgitation.   8. Trace pulmonic valve regurgitation.    < end of copied text >

## 2018-10-06 NOTE — PROGRESS NOTE ADULT - ASSESSMENT
Imp: 1.  Metastatic ER,NH, Her-2 pos breast cancer, on oral pan Her-2 inhibitor, admitted with bacteremia secondary to midline infection, growing enterococcus;      Anemia - normocytic  -s/p 2 units PRBC  -send anemia work up  -GI eval noted.  No plan for repeat EGD/colonoscopy as she had it done 1 yr ago     Enterococcus bacteremia  -clinically improving  -ID following; on IV Abx  -only peripheral line in place; midline d/c'd    Breast Ca  -on study oral drug - Poziotinib  -plan to resume as outpt     RUE DVT 2015  -has been on Xarelto 20mg QD  -in view of Breast Ca and on active therapy would recommend continue Xarelto at reduced dose of 10mg

## 2018-10-06 NOTE — PROGRESS NOTE ADULT - ASSESSMENT
This 72 y.o. F with breast cancer undergoing chemotherapy, prior PICC line infection, and now here with mid line infection, s/p removal, blood cultures with Enterococcus faecalis bacteremia.     - repeat cultures in ER 10/4/18 positive  - repeat cultures x 2 sets  PENDING    Enterococcus SS to all tested agents  -    -  AMPICILLIN 2 GRAMS Q4H   -  CEFTRIAXONE 2 GRAMS Q12H   BOTH for empiric endocarditis coverage  - needs ALINE to r/o endocarditis      will trend WBC   monitor for fevers

## 2018-10-06 NOTE — PROGRESS NOTE ADULT - SUBJECTIVE AND OBJECTIVE BOX
HPI: Patient is a 72y Female seen on consultation for the evaluation and management of metastatic breast cancer, Her-2 positive, heavily treated. Diagnosed in 2015  after presenting with right-sided pleural effusion , weight loss, extesnive adenopathy and bilateral breast cancer, ER/LA, Her-2 pos.  Currently on study drug Poziotinib, an oral pan Her-2 inhibitor.  She has active chest wall disease and lung involvement.  She has SVC Sx with dilated veins on chest wall and obstructed veins in neck.  Has had multiple PICC lines LUE; recently discontinued because of infection; midline placed after 3-4 days on po antibiotics; Midline infected; d/c'd yesterday; given po Keflex; Bl Cx growing enterococcus species.  Instructed to come to ER for treatment.  has no fevers, chills or sweats, nausea, vomiting or abdominal; pain.  Has diarrhea from study drug; drug on hold  Also anemic, with fall in Hb without reported bleeding, but serum ferritin is low.  She has been on long term steroids.  has known avascular necrosis of hip. Not considered surgical candidate.     pt seen and examined.  Doing well  afebrile, no N/V; no new complaints     PAST MEDICAL & SURGICAL HISTORY:  Essential hypertension  Lymphedema  PICC (peripherally inserted central catheter) in place  DVT of upper extremity (deep vein thrombosis): RIGHT  Breast cancer  S/P thoracentesis: 3/2016  S/P biopsy: R breast  PICC (peripherally inserted central catheter) in place      REVIEW OF SYSTEMS      General:Fatigued	    Skin/Breast:Dilated veins on chest and abdominal wall, resolving rash  	  Ophthalmologic:no double visio  	  ENMT:	no sore throat    Respiratory and Thorax:ANGEL  	  Cardiovascular:	no chest pain or palpitations    Gastrointestinal:	No abdominal pain, nausea or vomiting    Genitourinary:	no dysuria    Musculoskeletal:	left hip pain    Neurological:	no tremor    	        	    Allergic/Immunologic:	    MEDICATIONS  (STANDING):  vancomycin  IVPB 1000 milliGRAM(s) IV Intermittent once    MEDICATIONS  (PRN):      Allergies    No Known Allergies    Intolerances        SOCIAL HISTORY:    Smoking Status:denied  Alcohol:Denied  Marital Status:  Occupation:not working    FAMILY HISTORY:  Family history of breast cancer in first degree relative (Sibling): s/p mastectomy  Family history of lung cancer  Family history of cancer of frontal sinus: followed by enucleation      Vital Signs Last 24 Hrs  T(C): 36.8 (06 Oct 2018 09:32), Max: 36.8 (05 Oct 2018 15:13)  T(F): 98.2 (06 Oct 2018 09:32), Max: 98.3 (05 Oct 2018 15:13)  HR: 97 (06 Oct 2018 09:32) (77 - 97)  BP: 135/61 (06 Oct 2018 09:32) (108/71 - 135/61)  BP(mean): 71 (05 Oct 2018 15:13) (71 - 71)  RR: 20 (06 Oct 2018 09:32) (18 - 20)  SpO2: 100% (06 Oct 2018 09:32) (95% - 100%)  	  PHYSICAL EXAM:      Constitutional: no acute distress     Eyes:anicteric, pale    ENMT:no lesion    Neck:Dilated neck veins    Breasts:right breast contractyed with tumor, with nodularuity in chest wall, mild erythema; left breast without palp mass        Respiratory:Clear    Cardiovascular:RRR normal S1S2    Gastrointestinal:Soft, non-tender            Extremities:Bilateral UE lymphedema            Skin:Dilated veins on chest and abdominal wall                    LABS:                                         8.6    7.7   )-----------( 163      ( 06 Oct 2018 07:50 )             29.7             RADIOLOGY & ADDITIONAL STUDIES:

## 2018-10-06 NOTE — PROGRESS NOTE ADULT - SUBJECTIVE AND OBJECTIVE BOX
[facility]  INFECTIOUS DISEASES AND INTERNAL MEDICINE at [place]  =======================================================  Murray Duncan MD  Diplomates American Board of Internal Medicine and Infectious Diseases  =======================================================    DOMINIK PARKS [id number]  chief complaint: follow up on Enterococcus bacteremia  patient seen and examined in follow up.  Chart and labs reviewed.     repeat blood cultures on admission positive    Echo done overnight.  TTE without obvious vegetations  Enterococcus faecalis   REPEAT CX ARE PENDING    =======================================================  Allergies:  No Known Allergies      =======================================================  Antibiotics:  vancomycin  IVPB 1000 milliGRAM(s) IV Intermittent every 12 hours    Other medications:  amLODIPine   Tablet 5 milliGRAM(s) Oral daily       =======================================================     REVIEW OF SYSTEMS:  as above  all other ROS are negative    =======================================================    Physical Exam:   Vital Signs Last 24 Hrs  T(C): 37.4 (06 Oct 2018 15:53), Max: 37.4 (06 Oct 2018 15:53)  T(F): 99.4 (06 Oct 2018 15:53), Max: 99.4 (06 Oct 2018 15:53)  HR: 90 (06 Oct 2018 15:53) (77 - 110)  BP: 130/67 (06 Oct 2018 15:53) (108/71 - 135/61)  BP(mean): --  RR: 18 (06 Oct 2018 15:53) (18 - 20)  SpO2: 100% (06 Oct 2018 09:32) (96% - 100%)    General:  No acute distress.  Eye: Pupils are equal, round and reactive to light, Extraocular movements are intact, Normal conjunctiva.  HENT: Normocephalic, Oral mucosa is moist, No pharyngeal erythema, No sinus tenderness.  Neck: Supple, No lymphadenopathy.  Respiratory: Lungs are clear to auscultation, Respirations are non-labored.  Cardiovascular: Normal rate, Regular rhythm, No murmur, Good pulses equal in all extremities,  Right UPPER ext with compression hosiery.   Gastrointestinal: Soft, Non-tender, Non-distended, Normal bowel sounds.  Genitourinary: No costovertebral angle tenderness.  Lymphatics: No lymphadenopathy neck,   Musculoskeletal: Normal range of motion, Normal strength.  Integumentary: No rash.  Neurologic: Alert, Oriented, No focal deficits, Cranial Nerves II-XII are grossly intact.  Psychiatric: Appropriate mood & affect.    =======================================================  Labs:                                           8.6    7.7   )-----------( 163      ( 06 Oct 2018 07:50 )             29.7   10    141  |  104  |  14.0  ----------------------------<  92  5.1   |  28.0  |  0.49<L>    Ca    9.1      06 Oct 2018 07:50  Mg     1.4     10-    TPro  5.9<L>  /  Alb  3.3  /  TBili  0.7  /  DBili  0.1  /  AST  14  /  ALT  20  /  AlkPhos  68  10-05        Culture - Blood (collected 10-04-18 @ 17:14)  Source: .Blood Blood    Culture - Blood (collected 10-04-18 @ 17:13)  Source: .Blood Blood    Culture - Blood (collected 10-03-18 @ 17:38)  Source: .Blood  Final Report (10-05-18 @ 09:44):    Growth in aerobic and anaerobic bottles: Enterococcus faecalis    Aerobic Bottle: 13:10 Hours to positivity    Anaerobic Bottle: 13:50 Hours to positivity    .    TYPE: (C=Critical, N=Notification, A=Abnormal) C    TESTS:  _ Bld gs    DATE/TIME CALLED: _ 10/04/2018 10:31:22    CALLED TO: Alonzo SMITH ( Nurse/ Dr Temple Office)    READ BACK (2 Patient Identifiers)(Y/N): _ Y    READ BACK VALUES (Y/N): _ Y    CALLED BY: _ ursulain  Organism: Enterococcus faecalis (10-05-18 @ 09:44)  Organism: Enterococcus faecalis (10-05-18 @ 09:44)    Sensitivities:      -  Ampicillin: S <=2 Predicts results to ampicillin/sulbactam, amoxacillin-clavulanate and  piperacillin-tazobactam.      -  Gentamicin synergy: S      -  Streptomycin synergy: S      -  Vancomycin: S 2      Method Type: JUSTICE    Culture - Blood (collected 10-03-18 @ 17:38)  Source: .Blood  Final Report (10-05-18 @ 09:45):    Growth in aerobic and anaerobic bottles: Enterococcus faecalis    Aerobic Bottle: 13:20 Hours to positivity    Anaerobic Bottle: 12:20 Hours to positivity    .    ***Blood Panel PCR results on this specimen are available    approximately 3 hours after the Gram stain result.***    Gram stain, PCR, and/or culture results may not always    correspond due to difference in methodologies.    ************************************************************    This PCR assay was performed using [employer]    The following targets are tested for: Enterococcus,    vancomycin resistant enterococci, Listeria monocytogenes,    coagulase negative staphylococci, S. aureus,    methicillin resistant S. aureus, Streptococcus agalactiae    (Group B), S. pneumoniae, S. pyogenes (Group A),    Acinetobacter baumannii, Enterobacter cloacae, E. coli,    Klebsiella oxytoca, K. pneumoniae, Proteus sp.,    Serratia marcescens, Haemophilus influenzae,    Neisseria meningitidis, Pseudomonas aeruginosa, Candida    albicans, C. glabrata, C krusei, C parapsilosis,    C. tropicalis and the KPC resistance gene.    "Due to technical problems, Proteus sp. will Not be reported as part of    the BCID panel until further notice"    .    TYPE: (C=Critical, N=Notification, A=Abnormal) C    TESTS:  _ Bld GS    DATE/TIME CALLED: _ 10/04/2018 10:27:10    CALLED TO: Alonzo SMITH (Nurse/ Dr. Temple office)    READ BACK (2 Patient Identifiers)(Y/N): _ Y    READ BACK VALUES (Y/N): _ Y    CALLED BY: Alonzo vergarain  Organism: Enterococcus faecalis  Blood Culture PCR (10-05-18 @ 09:45)  Organism: Blood Culture PCR (10-05-18 @ 09:45)    Sensitivities:      -  Enterococcus species: Detec      Method Type: PCR  Organism: Enterococcus faecalis (10-05-18 @ 09:45)    Sensitivities:      -  Ampicillin: S <=2 Predicts results to ampicillin/sulbactam, amoxacillin-clavulanate and  piperacillin-tazobactam.      -  Gentamicin synergy: S      -  Streptomycin synergy: S      -  Vancomycin: S 2      Method Type: JUSTICE        =============    EXAM:  ECHO TRANSTHORACIC COMP W DOPP      PROCEDURE DATE:  Oct  4 2018   .      INTERPRETATION:  REPORT:    TRANSTHORACIC ECHOCARDIOGRAM REPORT         Patient Name:   DOMINIK PARKS Patient Location: [facility]  Medical Rec #:  [id number]     Accession #:      [id number]  Account #:                   Height:           65.7 in 167.0 cm  YOB: 1945    Weight:           141.1 lb 64.00 kg  Patient Age:    72 years     BSA:              1.72 m²  Patient Gender: F            BP:               128/74 mmHg       Date of Exam:        10/4/2018 7:20:38 PM  Sonographer:         Jaziel Rao  Referring Physician: Jonatan Chu MD    Procedure:     2D Echo/Doppler/Color Doppler Complete.  Indications:   Acute and subacute infective endocarditis - I33.0  Diagnosis:     Acute and subacute infective endocarditis - I33.0  Study Details: Technically fair study. Study quality was adversely   affected due                 to patient obesity and lung interference.         2D AND M-MODE MEASUREMENTS (normal ranges within parentheses):  Left                Normal    Aorta/Left           Normal  Ventricle:                    Atrium:  IVSd (2D):    0.96  (0.7-1.1) Aortic Root  2.80 cm (2.4-3.7)                cm              (2D):  LVPWd (2D):   0.86  (0.7-1.1) Left Atrium  2.75 cm (1.9-4.0)                cm              (2D):  LVIDd (2D):   4.22  (3.4-5.7) LA Volume    20.1                cm              Index        ml/m²  LVIDs (2D):   2.44            Right Ventricle:                cm              RVd (2D):        2.40 cm  LV FS (2D):   42.2  (>25%)    TAPSE:           1.79 cm                %  LV EF (2D):   74 %  (>55%)  Relative Wall 0.41  (<0.42)  Thickness    LV SYSTOLIC FUNCTION BY 2D PLANIMETRY (MOD):  EF-A4C View: 66.7 % EF-A2C View: 65.0 % EF-Biplane: 65 %    LV DIASTOLIC FUNCTION:  MV Peak E: 0.77 m/s e', MV Jessie: 0.07 m/s  MV Peak A: 0.82 m/s E/e' Ratio: 11.50  E/A Ratio: 0.95     Decel Time: 167 msec    SPECTRAL DOPPLER ANALYSIS (where applicable):  Mitral Valve:  MV P1/2 Time: 48.50 msec  MV Area, PHT: 4.54 cm²    Aortic Valve: AoV Max Feliciano: 1.68 m/s AoV Peak P.3 mmHg AoV Mean P.0 mmHg    LVOT Vmax: 1.03 m/s LVOT VTI: 0.213 m LVOT Diameter: 1.92 cm    AoV Area, Vmax: 1.78 cm² AoV Area, VTI: 1.85 cm² AoV Area, Vmn: 1.80 cm²  Ao VTI: 0.332  Aortic Insufficiency:  AI Half-time: 725 msec    Tricuspid Valve and PA/RV Systolic Pressure: TR Max Velocity: 1.89 m/s RA   Pressure: 3 mmHg RVSP/PASP: 17.3 mmHg       PHYSICIAN INTERPRETATION:  Left Ventricle: The left ventricular internal cavity size is normal. Left   ventricular wall thickness is normal.  Global LV systolic function was normal. Left ventricular ejection   fraction, by visual estimation, is 65 to 70%. Normal segmental left   ventricular systolic function.  Right Ventricle: Normal right ventricular size and function. TV S' 0.2   m/s.  Left Atrium: The left atrium is normal in size.  Right Atrium: The right atrium is normal in size.  Pericardium: There is no evidence of pericardial effusion.  Mitral Valve: The mitral valve is normal in structure. Mitral leaflet   mobility is normal. There is mild mitral annular calcification. Trace   mitral valve regurgitation is seen.  Tricuspid Valve: Structurally normal tricuspid valve, with normal leaflet   excursion. Trivial tricuspid regurgitation is visualized.  Aortic Valve: The aortic valve is trileaflet. Peak Av velocity is 1.68   m/s, mean transaortic gradient equals 6.0 mmHg, the calculated aortic   valve area equals 1.85 cm² by the continuity equation consistent with   mild aortic stenosis. Mild aortic valve regurgitation is seen.  Pulmonic Valve: Structurally normal pulmonic valve, with normal leaflet   excursion. Trace pulmonic valve regurgitation.  Aorta: The aortic root and ascending aorta are structurally normal, with   no evidence of dilitation.  Pulmonary Artery: The main pulmonary artery is normal in size.  Venous: The pulmonary veins appear normal. The inferior vena cava was   normal sized, with respiratory size variation greater than 50%.       Summary:   1. Left ventricular ejection fraction, by visual estimation, is 65 to   70%.   2. Normal global left ventricular systolic function.   3. Normal right ventricular size and function.   4. There is no evidence of pericardial effusion.   5. Mild mitral annular calcification.   6. Trace tricuspid regurgitation.   7. Mild aortic regurgitation.   8. Trace pulmonic valve regurgitation.    [id number] Carlos Diaz MD, Electronically signed on 10/5/2018 at 8:29:41 AM              *** Final ***                  CARLOS DIAZ MD  This document has been electronically signed. Oct  4 2018  7:20PM

## 2018-10-06 NOTE — PROGRESS NOTE ADULT - ASSESSMENT
This is 73 y/o woman with PMH of HER-2 positive breast cancer,  initial right breast involvement and also involvement of the left breast. Pt had a longstanding PICC line for infusion of recent chemotherapy of Poziotinib. On 9/7/18, there was an infection of the PICC line and it was removed.  She was given a course of Keflex and on 9/14/18, a midline was placed.  patient recently developed drainage under the dressing of the mid line. mid line was not working properly and  It was removed 10/3/18; Blood cultures x 2 sets were sent and 4 of 4 bottles are growing enterococcus. Of note, patient prior PICC tip grew out pan-susceptible Pseudomonas. Pt. reports that she was diagnosed with DVT in her RUE in 2015, was started on Xarelto about 8 months later on repeat doppler no blood clot was found but her oncologist kept her on xeralto. Pt was admitted for anemia, no obvious source of bleeding, s/p PRBC, seen by ID, Hematology and GI.    A/P    >Anemia No obvious source of bleeding  pt's anemia is likely of chronic disease,, no overt gi bleed.   s/p prbc transfusion, H&h improved  appreciate Gi input - At this time, I recommend consideration for stopping Xarelto- if it can be stopped. Can consider iron infusion/supplementation once possible.   No need of EGd/colonoscopy  She had EGD and colonoscopy performed less than 1 year ago.   Pt has h/o old DVT 2015 - on Xarelto - hold for now, will discuss with Dr. Galicia to see if we can stopped it.   will do iron, ferritin, should have done before PRBC    >Enterococcus bacteremia   appreciate ID input - stop Vancomycin, start AMPICILLIN 2 GRAMS Q4H,  CEFTRIAXONE 2 GRAMS Q12H   BOTH for empiric endocarditis coverage  TTE - LVEF of 65-70%  needs ALINE to r/o endocarditis - cardiology consult appreciated - schedule on Monday   f/u repeat blood c/s     >Hypokalemia - resolved    >h/o breast cancer - appreciate hematology input - c/w medication as per hematology     >h/o DVT diagnosed in 2015   Xarelto on hold due to severe anemia - will discuss with hematology to see if we can stop Xarelto    >HTN - stable  c/w amlodipine     >DVT PPX - SCD

## 2018-10-07 LAB
ANION GAP SERPL CALC-SCNC: 12 MMOL/L — SIGNIFICANT CHANGE UP (ref 5–17)
BUN SERPL-MCNC: 12 MG/DL — SIGNIFICANT CHANGE UP (ref 8–20)
CALCIUM SERPL-MCNC: 9.6 MG/DL — SIGNIFICANT CHANGE UP (ref 8.6–10.2)
CHLORIDE SERPL-SCNC: 102 MMOL/L — SIGNIFICANT CHANGE UP (ref 98–107)
CO2 SERPL-SCNC: 26 MMOL/L — SIGNIFICANT CHANGE UP (ref 22–29)
CREAT SERPL-MCNC: 0.56 MG/DL — SIGNIFICANT CHANGE UP (ref 0.5–1.3)
FERRITIN SERPL-MCNC: 70 NG/ML — SIGNIFICANT CHANGE UP (ref 15–150)
FOLATE SERPL-MCNC: 18 NG/ML — SIGNIFICANT CHANGE UP
GLUCOSE SERPL-MCNC: 85 MG/DL — SIGNIFICANT CHANGE UP (ref 70–115)
HCT VFR BLD CALC: 32.7 % — LOW (ref 37–47)
HGB BLD-MCNC: 9.9 G/DL — LOW (ref 12–16)
IRON SATN MFR SERPL: 23 UG/DL — LOW (ref 37–145)
LDH SERPL L TO P-CCNC: 326 U/L — HIGH (ref 98–192)
MAGNESIUM SERPL-MCNC: 1.8 MG/DL — SIGNIFICANT CHANGE UP (ref 1.6–2.6)
MCHC RBC-ENTMCNC: 26.5 PG — LOW (ref 27–31)
MCHC RBC-ENTMCNC: 30.3 G/DL — LOW (ref 32–36)
MCV RBC AUTO: 87.7 FL — SIGNIFICANT CHANGE UP (ref 81–99)
PLATELET # BLD AUTO: 228 K/UL — SIGNIFICANT CHANGE UP (ref 150–400)
POTASSIUM SERPL-MCNC: 4.1 MMOL/L — SIGNIFICANT CHANGE UP (ref 3.5–5.3)
POTASSIUM SERPL-SCNC: 4.1 MMOL/L — SIGNIFICANT CHANGE UP (ref 3.5–5.3)
RBC # BLD: 3.67 M/UL — LOW (ref 4.4–5.2)
RBC # BLD: 3.73 M/UL — LOW (ref 4.4–5.2)
RBC # FLD: 18.7 % — HIGH (ref 11–15.6)
RETICS #: 11.7 K/UL — LOW (ref 25–125)
RETICS/RBC NFR: 3 % — HIGH (ref 0.5–2.5)
SODIUM SERPL-SCNC: 140 MMOL/L — SIGNIFICANT CHANGE UP (ref 135–145)
VIT B12 SERPL-MCNC: >2000 PG/ML — HIGH (ref 232–1245)
WBC # BLD: 8.1 K/UL — SIGNIFICANT CHANGE UP (ref 4.8–10.8)
WBC # FLD AUTO: 8.1 K/UL — SIGNIFICANT CHANGE UP (ref 4.8–10.8)

## 2018-10-07 PROCEDURE — 99232 SBSQ HOSP IP/OBS MODERATE 35: CPT

## 2018-10-07 RX ADMIN — AMLODIPINE BESYLATE 5 MILLIGRAM(S): 2.5 TABLET ORAL at 06:40

## 2018-10-07 RX ADMIN — Medication 650 MILLIGRAM(S): at 12:09

## 2018-10-07 RX ADMIN — CEFTRIAXONE 100 GRAM(S): 500 INJECTION, POWDER, FOR SOLUTION INTRAMUSCULAR; INTRAVENOUS at 22:48

## 2018-10-07 RX ADMIN — CEFTRIAXONE 100 GRAM(S): 500 INJECTION, POWDER, FOR SOLUTION INTRAMUSCULAR; INTRAVENOUS at 11:29

## 2018-10-07 RX ADMIN — Medication 216 GRAM(S): at 19:59

## 2018-10-07 RX ADMIN — Medication 650 MILLIGRAM(S): at 12:40

## 2018-10-07 RX ADMIN — Medication 216 GRAM(S): at 06:40

## 2018-10-07 RX ADMIN — Medication 650 MILLIGRAM(S): at 00:11

## 2018-10-07 RX ADMIN — Medication 216 GRAM(S): at 12:18

## 2018-10-07 RX ADMIN — Medication 216 GRAM(S): at 02:00

## 2018-10-07 RX ADMIN — Medication 650 MILLIGRAM(S): at 22:48

## 2018-10-07 RX ADMIN — CEFTRIAXONE 100 GRAM(S): 500 INJECTION, POWDER, FOR SOLUTION INTRAMUSCULAR; INTRAVENOUS at 00:45

## 2018-10-07 NOTE — PROGRESS NOTE ADULT - SUBJECTIVE AND OBJECTIVE BOX
Internal Medicine Hospitalist - Dr. Damien PARKS    [id number]    72y      Female    Patient is a 72y old  Female who presents with a chief complaint of abnormal labs (06 Oct 2018 17:35)    INTERVAL HPI/ OVERNIGHT EVENTS: Patient is seen and examined, no new event overnight.     REVIEW OF SYSTEMS:    Denied fever, chills, abd. pain, nausea, vomiting, chest pain, SOB, headache, dizziness    PHYSICAL EXAM:    Vital Signs Last 24 Hrs  T(C): 37 (07 Oct 2018 06:39), Max: 37.4 (06 Oct 2018 15:53)  T(F): 98.6 (07 Oct 2018 06:39), Max: 99.4 (06 Oct 2018 15:53)  HR: 77 (07 Oct 2018 06:39) (74 - 110)  BP: 116/59 (07 Oct 2018 06:39) (103/55 - 135/61)  BP(mean): --  RR: 18 (07 Oct 2018 06:39) (16 - 20)  SpO2: 98% (07 Oct 2018 06:39) (98% - 100%)    GENERAL: NAD  CHEST/LUNG: CTA b/l   HEART: S1S2+ audible  ABDOMEN: Soft, Nontender, Nondistended; Bowel sounds present  EXTREMITIES:  no edema  CNS: AAO X 3      LABS:                        9.9    8.1   )-----------( 228      ( 07 Oct 2018 08:26 )             32.7     10-07    140  |  102  |  12.0  ----------------------------<  85  4.1   |  26.0  |  0.56    Ca    9.6      07 Oct 2018 08:26  Mg     1.8     10-07    TPro  5.9<L>  /  Alb  3.3  /  TBili  0.7  /  DBili  0.1  /  AST  14  /  ALT  20  /  AlkPhos  68  10-05    PT/INR - ( 05 Oct 2018 10:18 )   PT: 11.1 sec;   INR: 1.01 ratio         PTT - ( 05 Oct 2018 10:18 )  PTT:29.1 sec        MEDICATIONS  (STANDING):  amLODIPine   Tablet 5 milliGRAM(s) Oral daily  ampicillin  IVPB      ampicillin  IVPB 2 Gram(s) IV Intermittent every 4 hours  cefTRIAXone   IVPB 2 Gram(s) IV Intermittent every 12 hours  cefTRIAXone   IVPB        MEDICATIONS  (PRN):  acetaminophen   Tablet .. 650 milliGRAM(s) Oral every 6 hours PRN Moderate Pain (4 - 6)      RADIOLOGY & ADDITIONAL TEST

## 2018-10-07 NOTE — PROGRESS NOTE ADULT - ASSESSMENT
This 72 y.o. F with breast cancer undergoing chemotherapy, prior PICC line infection, and now here with mid line infection, s/p removal, blood cultures with Enterococcus faecalis bacteremia.     - repeat cultures in ER 10/4/18 positive  - repeat cultures x 2 sets   NEGATIVE     Enterococcus SS to all tested agents  -    -  AMPICILLIN 2 GRAMS Q4H   -  CEFTRIAXONE 2 GRAMS Q12H   BOTH for empiric endocarditis coverage  - needs ALINE to r/o endocarditis      will trend WBC   monitor for fevers  OVERALL IMPROVED

## 2018-10-07 NOTE — PROGRESS NOTE ADULT - SUBJECTIVE AND OBJECTIVE BOX
[facility]  INFECTIOUS DISEASES AND INTERNAL MEDICINE at [place]  =======================================================  Murray Duncan MD  Diplomates American Board of Internal Medicine and Infectious Diseases  =======================================================    DOMINIK PARKS [id number]  chief complaint: follow up on Enterococcus bacteremia  patient seen and examined in follow up.  Chart and labs reviewed.     repeat blood cultures on admission positive    Echo done overnight.  TTE without obvious vegetations  Enterococcus faecalis   REPEAT CX ARE NOW NEGATIVE X 48HRS    =======================================================  Allergies:  No Known Allergies      =======================================================  Antibiotics:  vancomycin  IVPB 1000 milliGRAM(s) IV Intermittent every 12 hours    Other medications:  amLODIPine   Tablet 5 milliGRAM(s) Oral daily       =======================================================     REVIEW OF SYSTEMS:  as above  all other ROS are negative    =======================================================    Physical Exam:    Vital Signs Last 24 Hrs  T(C): 37 (07 Oct 2018 06:39), Max: 37 (07 Oct 2018 06:39)  T(F): 98.6 (07 Oct 2018 06:39), Max: 98.6 (07 Oct 2018 06:39)  HR: 77 (07 Oct 2018 06:39) (74 - 77)  BP: 116/59 (07 Oct 2018 06:39) (103/55 - 116/59)  BP(mean): --  RR: 18 (07 Oct 2018 06:39) (16 - 18)  SpO2: 98% (07 Oct 2018 06:39) (98% - 98%)    General:  No acute distress.  Eye: Pupils are equal, round and reactive to light, Extraocular movements are intact, Normal conjunctiva.  HENT: Normocephalic, Oral mucosa is moist, No pharyngeal erythema, No sinus tenderness.  Neck: Supple, No lymphadenopathy.  Respiratory: Lungs are clear to auscultation, Respirations are non-labored.  Cardiovascular: Normal rate, Regular rhythm, No murmur, Good pulses equal in all extremities,  Right UPPER ext with compression hosiery.   Gastrointestinal: Soft, Non-tender, Non-distended, Normal bowel sounds.  Genitourinary: No costovertebral angle tenderness.  Lymphatics: No lymphadenopathy neck,   Musculoskeletal: Normal range of motion, Normal strength.  Integumentary: No rash.  Neurologic: Alert, Oriented, No focal deficits, Cranial Nerves II-XII are grossly intact.  Psychiatric: Appropriate mood & affect.    =======================================================  Labs:                                                        9.9    8.1   )-----------( 228      ( 07 Oct 2018 08:26 )             32.7   10-07    140  |  102  |  12.0  ----------------------------<  85  4.1   |  26.0  |  0.56    Ca    9.6      07 Oct 2018 08:26  Mg     1.8     10-07        Culture - Blood (collected 10-04-18 @ 17:14)  Source: .Blood Blood    Culture - Blood (collected 10-04-18 @ 17:13)  Source: .Blood Blood    Culture - Blood (collected 10-03-18 @ 17:38)  Source: .Blood  Final Report (10-05-18 @ 09:44):    Growth in aerobic and anaerobic bottles: Enterococcus faecalis    Aerobic Bottle: 13:10 Hours to positivity    Anaerobic Bottle: 13:50 Hours to positivity    .    TYPE: (C=Critical, N=Notification, A=Abnormal) C    TESTS:  _ Bld gs    DATE/TIME CALLED: _ 10/04/2018 10:31:22    CALLED TO: Alonzo SMITH ( Nurse/ Dr Temple Office)    READ BACK (2 Patient Identifiers)(Y/N): _ Y    READ BACK VALUES (Y/N): _ Y    CALLED BY: _ ursulain  Organism: Enterococcus faecalis (10-05-18 @ 09:44)  Organism: Enterococcus faecalis (10-05-18 @ 09:44)    Sensitivities:      -  Ampicillin: S <=2 Predicts results to ampicillin/sulbactam, amoxacillin-clavulanate and  piperacillin-tazobactam.      -  Gentamicin synergy: S      -  Streptomycin synergy: S      -  Vancomycin: S 2      Method Type: JUSTICE    Culture - Blood (collected 10-03-18 @ 17:38)  Source: .Blood  Final Report (10-05-18 @ 09:45):    Growth in aerobic and anaerobic bottles: Enterococcus faecalis    Aerobic Bottle: 13:20 Hours to positivity    Anaerobic Bottle: 12:20 Hours to positivity    .    ***Blood Panel PCR results on this specimen are available    approximately 3 hours after the Gram stain result.***    Gram stain, PCR, and/or culture results may not always    correspond due to difference in methodologies.    ************************************************************    This PCR assay was performed using [employer].    The following targets are tested for: Enterococcus,    vancomycin resistant enterococci, Listeria monocytogenes,    coagulase negative staphylococci, S. aureus,    methicillin resistant S. aureus, Streptococcus agalactiae    (Group B), S. pneumoniae, S. pyogenes (Group A),    Acinetobacter baumannii, Enterobacter cloacae, E. coli,    Klebsiella oxytoca, K. pneumoniae, Proteus sp.,    Serratia marcescens, Haemophilus influenzae,    Neisseria meningitidis, Pseudomonas aeruginosa, Candida    albicans, C. glabrata, C krusei, C parapsilosis,    C. tropicalis and the KPC resistance gene.    "Due to technical problems, Proteus sp. will Not be reported as part of    the BCID panel until further notice"    .    TYPE: (C=Critical, N=Notification, A=Abnormal) C    TESTS:  _ Bld GS    DATE/TIME CALLED: _ 10/04/2018 10:27:10    CALLED TO: Alonzo SMITH (Nurse/ Dr. Temple office)    READ BACK (2 Patient Identifiers)(Y/N): _ Y    READ BACK VALUES (Y/N): _ Y    CALLED BY: _ sherrishin  Organism: Enterococcus faecalis  Blood Culture PCR (10-05-18 @ 09:45)  Organism: Blood Culture PCR (10-05-18 @ 09:45)    Sensitivities:      -  Enterococcus species: Detec      Method Type: PCR  Organism: Enterococcus faecalis (10-05-18 @ 09:45)    Sensitivities:      -  Ampicillin: S <=2 Predicts results to ampicillin/sulbactam, amoxacillin-clavulanate and  piperacillin-tazobactam.      -  Gentamicin synergy: S      -  Streptomycin synergy: S      -  Vancomycin: S 2      Method Type: JUSTICE        =============    EXAM:  ECHO TRANSTHORACIC COMP W DOPP     ECHO       PHYSICIAN INTERPRETATION:  Left Ventricle: The left ventricular internal cavity size is normal. Left   ventricular wall thickness is normal.  Global LV systolic function was normal. Left ventricular ejection   fraction, by visual estimation, is 65 to 70%. Normal segmental left   ventricular systolic function.  Right Ventricle: Normal right ventricular size and function. TV S' 0.2   m/s.  Left Atrium: The left atrium is normal in size.  Right Atrium: The right atrium is normal in size.  Pericardium: There is no evidence of pericardial effusion.  Mitral Valve: The mitral valve is normal in structure. Mitral leaflet   mobility is normal. There is mild mitral annular calcification. Trace   mitral valve regurgitation is seen.  Tricuspid Valve: Structurally normal tricuspid valve, with normal leaflet   excursion. Trivial tricuspid regurgitation is visualized.  Aortic Valve: The aortic valve is trileaflet. Peak Av velocity is 1.68   m/s, mean transaortic gradient equals 6.0 mmHg, the calculated aortic   valve area equals 1.85 cm² by the continuity equation consistent with   mild aortic stenosis. Mild aortic valve regurgitation is seen.  Pulmonic Valve: Structurally normal pulmonic valve, with normal leaflet   excursion. Trace pulmonic valve regurgitation.  Aorta: The aortic root and ascending aorta are structurally normal, with   no evidence of dilitation.  Pulmonary Artery: The main pulmonary artery is normal in size.  Venous: The pulmonary veins appear normal. The inferior vena cava was   normal sized, with respiratory size variation greater than 50%.       Summary:   1. Left ventricular ejection fraction, by visual estimation, is 65 to   70%.   2. Normal global left ventricular systolic function.   3. Normal right ventricular size and function.   4. There is no evidence of pericardial effusion.   5. Mild mitral annular calcification.   6. Trace tricuspid regurgitation.   7. Mild aortic regurgitation.   8. Trace pulmonic valve regurgitation.    [id number] Kristofer Florez MD, Electronically signed on 10/5/2018 at 8:29:41 AM

## 2018-10-07 NOTE — PROGRESS NOTE ADULT - ASSESSMENT
This is 71 y/o woman with PMH of HER-2 positive breast cancer,  initial right breast involvement and also involvement of the left breast. Pt had a longstanding PICC line for infusion of recent chemotherapy of Poziotinib. On 9/7/18, there was an infection of the PICC line and it was removed.  She was given a course of Keflex and on 9/14/18, a midline was placed.  patient recently developed drainage under the dressing of the mid line. mid line was not working properly and  It was removed 10/3/18; Blood cultures x 2 sets were sent and 4 of 4 bottles are growing enterococcus. Of note, patient prior PICC tip grew out pan-susceptible Pseudomonas. Pt. reports that she was diagnosed with DVT in her RUE in 2015, was started on Xarelto about 8 months later on repeat doppler no blood clot was found but her oncologist kept her on xeralto. Pt was admitted for anemia, no obvious source of bleeding, s/p PRBC, seen by ID, Hematology and GI.    A/P    >Anemia No obvious source of bleeding  pt's anemia is likely of chronic disease,, no overt gi bleed.   s/p prbc transfusion, H&h improved  appreciate Gi input - At this time, I recommend consideration for stopping Xarelto- if it can be stopped. Can consider iron infusion/supplementation once possible.   No need of EGd/colonoscopy  She had EGD and colonoscopy performed less than 1 year ago.   check iron, ferritin, folate and b12  Pt has h/o old DVT 2015 - was on Xarelto - per hematology has been on Xarelto 20mg QD, in view of Breast Ca and on active therapy would recommend continue Xarelto at reduced dose of 10mg    >Enterococcus bacteremia   appreciate ID input - stop Vancomycin, start AMPICILLIN 2 GRAMS Q4H,  CEFTRIAXONE 2 GRAMS Q12H   BOTH for empiric endocarditis coverage  TTE - LVEF of 65-70%  needs ALINE to r/o endocarditis - cardiology consult appreciated - schedule on Monday   f/u repeat blood c/s     >Hypomagnesemia - resolved    >h/o breast cancer - appreciate hematology input - c/w medication as per hematology     >h/o DVT diagnosed in 2015   appreciate hematology - has been on Xarelto 20mg QD in view of Breast Ca and on active therapy would recommend continue Xarelto at reduced dose of 10mg - will start after ALINE tomorrow    >HTN - stable  c/w amlodipine     >DVT PPX - SCD

## 2018-10-07 NOTE — PROGRESS NOTE ADULT - ASSESSMENT
Imp: 1.  Metastatic ER,MA, Her-2 pos breast cancer, on oral pan Her-2 inhibitor, admitted with bacteremia secondary to midline infection, growing enterococcus;      Anemia - normocytic  -s/p 2 units PRBC  -send anemia work up  -GI eval noted.  No plan for repeat EGD/colonoscopy as she had it done 1 yr ago     Enterococcus bacteremia  -clinically improving  -ID following; on IV Abx  -only peripheral line in place; midline d/c'd    Breast Ca  -on study oral drug - Poziotinib  -plan to resume as outpt     RUE DVT 2015  -has been on Xarelto 20mg QD  -in view of Breast Ca and on active therapy would recommend continue Xarelto at reduced dose of 10mg   -Xarelto to start after ALINE tomorrow

## 2018-10-07 NOTE — PROGRESS NOTE ADULT - SUBJECTIVE AND OBJECTIVE BOX
HPI: Patient is a 72y Female seen on consultation for the evaluation and management of metastatic breast cancer, Her-2 positive, heavily treated. Diagnosed in 2015  after presenting with right-sided pleural effusion , weight loss, extesnive adenopathy and bilateral breast cancer, ER/PA, Her-2 pos.  Currently on study drug Poziotinib, an oral pan Her-2 inhibitor.  She has active chest wall disease and lung involvement.  She has SVC Sx with dilated veins on chest wall and obstructed veins in neck.  Has had multiple PICC lines LUE; recently discontinued because of infection; midline placed after 3-4 days on po antibiotics; Midline infected; d/c'd yesterday; given po Keflex; Bl Cx growing enterococcus species.  Instructed to come to ER for treatment.  has no fevers, chills or sweats, nausea, vomiting or abdominal; pain.  Has diarrhea from study drug; drug on hold  Also anemic, with fall in Hb without reported bleeding, but serum ferritin is low.  She has been on long term steroids.  has known avascular necrosis of hip. Not considered surgical candidate.     pt seen and examined.  Doing well  afebrile, no N/V; no new complaints     PAST MEDICAL & SURGICAL HISTORY:  Essential hypertension  Lymphedema  PICC (peripherally inserted central catheter) in place  DVT of upper extremity (deep vein thrombosis): RIGHT  Breast cancer  S/P thoracentesis: 3/2016  S/P biopsy: R breast  PICC (peripherally inserted central catheter) in place      REVIEW OF SYSTEMS      General:Fatigued	    Skin/Breast:Dilated veins on chest and abdominal wall, resolving rash  	  Ophthalmologic:no double visio  	  ENMT:	no sore throat    Respiratory and Thorax:ANGEL  	  Cardiovascular:	no chest pain or palpitations    Gastrointestinal:	No abdominal pain, nausea or vomiting    Genitourinary:	no dysuria    Musculoskeletal:	left hip pain    Neurological:	no tremor    	        	    Allergic/Immunologic:	    MEDICATIONS  (STANDING):  vancomycin  IVPB 1000 milliGRAM(s) IV Intermittent once    MEDICATIONS  (PRN):      Allergies    No Known Allergies    Intolerances        SOCIAL HISTORY:    Smoking Status:denied  Alcohol:Denied  Marital Status:  Occupation:not working    FAMILY HISTORY:  Family history of breast cancer in first degree relative (Sibling): s/p mastectomy  Family history of lung cancer  Family history of cancer of frontal sinus: followed by enucleation      Vital Signs Last 24 Hrs  T(C): 36.8 (06 Oct 2018 09:32), Max: 36.8 (05 Oct 2018 15:13)  T(F): 98.2 (06 Oct 2018 09:32), Max: 98.3 (05 Oct 2018 15:13)  HR: 97 (06 Oct 2018 09:32) (77 - 97)  BP: 135/61 (06 Oct 2018 09:32) (108/71 - 135/61)  BP(mean): 71 (05 Oct 2018 15:13) (71 - 71)  RR: 20 (06 Oct 2018 09:32) (18 - 20)  SpO2: 100% (06 Oct 2018 09:32) (95% - 100%)  	  PHYSICAL EXAM:      Constitutional: no acute distress     Eyes:anicteric, pale    ENMT:no lesion    Neck:Dilated neck veins    Breasts:right breast contractyed with tumor, with nodularuity in chest wall, mild erythema; left breast without palp mass        Respiratory:Clear    Cardiovascular:RRR normal S1S2    Gastrointestinal:Soft, non-tender            Extremities:Bilateral UE lymphedema            Skin:Dilated veins on chest and abdominal wall                    LABS:                                         8.6    7.7   )-----------( 163      ( 06 Oct 2018 07:50 )             29.7             RADIOLOGY & ADDITIONAL STUDIES:

## 2018-10-08 PROCEDURE — 99232 SBSQ HOSP IP/OBS MODERATE 35: CPT

## 2018-10-08 RX ORDER — RIVAROXABAN 15 MG-20MG
10 KIT ORAL EVERY 24 HOURS
Qty: 0 | Refills: 0 | Status: DISCONTINUED | OUTPATIENT
Start: 2018-10-08 | End: 2018-10-12

## 2018-10-08 RX ORDER — IRON SUCROSE 20 MG/ML
200 INJECTION, SOLUTION INTRAVENOUS EVERY 24 HOURS
Qty: 0 | Refills: 0 | Status: DISCONTINUED | OUTPATIENT
Start: 2018-10-08 | End: 2018-10-12

## 2018-10-08 RX ADMIN — Medication 216 GRAM(S): at 00:27

## 2018-10-08 RX ADMIN — Medication 650 MILLIGRAM(S): at 05:26

## 2018-10-08 RX ADMIN — AMLODIPINE BESYLATE 5 MILLIGRAM(S): 2.5 TABLET ORAL at 05:26

## 2018-10-08 RX ADMIN — Medication 216 GRAM(S): at 00:26

## 2018-10-08 RX ADMIN — Medication 650 MILLIGRAM(S): at 06:54

## 2018-10-08 RX ADMIN — IRON SUCROSE 110 MILLIGRAM(S): 20 INJECTION, SOLUTION INTRAVENOUS at 18:58

## 2018-10-08 RX ADMIN — RIVAROXABAN 10 MILLIGRAM(S): KIT at 18:58

## 2018-10-08 RX ADMIN — CEFTRIAXONE 100 GRAM(S): 500 INJECTION, POWDER, FOR SOLUTION INTRAMUSCULAR; INTRAVENOUS at 11:27

## 2018-10-08 RX ADMIN — Medication 216 GRAM(S): at 22:36

## 2018-10-08 RX ADMIN — Medication 650 MILLIGRAM(S): at 22:36

## 2018-10-08 RX ADMIN — Medication 650 MILLIGRAM(S): at 01:42

## 2018-10-08 RX ADMIN — Medication 216 GRAM(S): at 18:58

## 2018-10-08 RX ADMIN — Medication 216 GRAM(S): at 10:31

## 2018-10-08 RX ADMIN — Medication 216 GRAM(S): at 05:25

## 2018-10-08 RX ADMIN — Medication 216 GRAM(S): at 13:55

## 2018-10-08 NOTE — PROGRESS NOTE ADULT - ASSESSMENT
This 72 y.o. F with breast cancer undergoing chemotherapy, prior PICC line infection, and now here with mid line infection, s/p removal, blood cultures with Enterococcus faecalis bacteremia.     - repeat cultures in ER 10/4/18 positive for E faecalis  - repeat cultures x 2 sets on 10/5/18 NEGATIVE     Enterococcus SS to all tested agents  -  AMPICILLIN 2 GRAMS Q4H   -  CEFTRIAXONE 2 GRAMS Q12H   - anticipate 14 day course, ends on 10/18/18  - needs PICC    will trend WBC   monitor for fevers

## 2018-10-08 NOTE — PROGRESS NOTE ADULT - SUBJECTIVE AND OBJECTIVE BOX
[facility]  INFECTIOUS DISEASES AND INTERNAL MEDICINE at [place]  =======================================================  Murray Duncan MD  Diplomates American Board of Internal Medicine and Infectious Diseases  =======================================================    DOMINIK PARKS [id number]  chief complaint: follow up on Enterococcus bacteremia  patient seen and examined in follow up.  Chart and labs reviewed.     Blood cultures from 10/5/18 negative  prior with Enterococcus faecalis   ALINE negative for vegetations    =======================================================  Allergies:  No Known Allergies      =======================================================  Antibiotics:  ampicillin  IVPB      ampicillin  IVPB 2 Gram(s) IV Intermittent every 4 hours  cefTRIAXone   IVPB 2 Gram(s) IV Intermittent every 12 hours  cefTRIAXone   IVPB         =======================================================     REVIEW OF SYSTEMS:  as above  all other ROS are negative    =======================================================    Physical Exam:    Vital Signs Last 24 Hrs  T(C): 36.8 (08 Oct 2018 11:36), Max: 37 (07 Oct 2018 15:00)  T(F): 98.2 (08 Oct 2018 11:36), Max: 98.6 (07 Oct 2018 15:00)  HR: 88 (08 Oct 2018 11:36) (79 - 90)  BP: 120/60 (08 Oct 2018 11:36) (115/60 - 122/64)  RR: 18 (08 Oct 2018 11:36) (18 - 18)  SpO2: 98% (08 Oct 2018 11:36) (96% - 98%)      General:  No acute distress.  Eye: Pupils are equal, round and reactive to light, Extraocular movements are intact, Normal conjunctiva.  HENT: Normocephalic, Oral mucosa is moist, No pharyngeal erythema, No sinus tenderness.  Neck: Supple, No lymphadenopathy.  Respiratory: Lungs are clear to auscultation, Respirations are non-labored.  Cardiovascular: Normal rate, Regular rhythm, No murmur, Good pulses equal in all extremities,  Right UPPER ext with compression hosiery.   Gastrointestinal: Soft, Non-tender, Non-distended, Normal bowel sounds.  Genitourinary: No costovertebral angle tenderness.  Lymphatics: No lymphadenopathy neck,   Musculoskeletal: Normal range of motion, Normal strength.  Integumentary: No rash.  Neurologic: Alert, Oriented, No focal deficits, Cranial Nerves II-XII are grossly intact.  Psychiatric: Appropriate mood & affect.    =======================================================    Labs:                        9.9    8.1   )-----------( 228      ( 07 Oct 2018 08:26 )             32.7     10-07    140  |  102  |  12.0  ----------------------------<  85  4.1   |  26.0  |  0.56    Ca    9.6      07 Oct 2018 08:26  Mg     1.8     10-07          Culture - Blood (collected 10-05-18 @ 10:20)  Source: .Blood Blood    Culture - Blood (collected 10-05-18 @ 10:20)  Source: .Blood Blood    Culture - Blood (collected 10-04-18 @ 17:14)  Source: .Blood Blood  Final Report (10-06-18 @ 10:44):    Growth in anaerobic bottle: Enterococcus faecalis    Anaerobic Bottle: 13:59 Hours to positivity    ,    TYPE: (C=Critical, N=Notification, A=Abnormal) c    TESTS:  _ gs    DATE/TIME CALLED: _ 10/05/2018 09:47:57    CALLED TO: Alonzo yu rn    READ BACK (2 Patient Identifiers)(Y/N): _ y    READ BACK VALUES (Y/N): _ y    CALLED BY: Alonzo amato  Organism: Enterococcus faecalis (10-06-18 @ 10:44)  Organism: Enterococcus faecalis (10-06-18 @ 10:44)    Sensitivities:      -  Ampicillin: S <=2 Predicts results to ampicillin/sulbactam, amoxacillin-clavulanate and  piperacillin-tazobactam.      -  Gentamicin synergy: S      -  Streptomycin synergy: S      -  Vancomycin: S 2      Method Type: JUSTICE    Culture - Blood (collected 10-04-18 @ 17:13)  Source: .Blood Blood  Final Report (10-06-18 @ 10:44):    Growth in aerobic and anaerobic bottles: Enterococcus faecalis    Aerobic Bottle: 13:20 Hours to positivity    Anaerobic Bottle: 12:24 Hours to positivity    ,    c c    TESTS:  _ gs    DATE/TIME CALLED: _ 10/05/2018 09:45:51    CALLED TO: Alonzo yu rn    READ BACK (2 Patient Identifiers)(Y/N): _ y    READ BACK VALUES (Y/N): _ y    CALLED BY: Alonzo amato  Organism: Enterococcus faecalis (10-06-18 @ 10:44)  Organism: Enterococcus faecalis (10-06-18 @ 10:44)    Sensitivities:      -  Ampicillin: S <=2 Predicts results to ampicillin/sulbactam, amoxacillin-clavulanate and  piperacillin-tazobactam.      -  Gentamicin synergy: S      -  Streptomycin synergy: S      -  Vancomycin: S 2      Method Type: JUSTICE    Culture - Blood (collected 10-03-18 @ 17:38)  Source: .Blood  Final Report (10-05-18 @ 09:44):    Growth in aerobic and anaerobic bottles: Enterococcus faecalis    Aerobic Bottle: 13:10 Hours to positivity    Anaerobic Bottle: 13:50 Hours to positivity    .    TYPE: (C=Critical, N=Notification, A=Abnormal) C    TESTS:  _ Bld gs    DATE/TIME CALLED: _ 10/04/2018 10:31:22    CALLED TO: Alonzo SMITH ( Nurse/ Dr Temple Office)    READ BACK (2 Patient Identifiers)(Y/N): _ Y    READ BACK VALUES (Y/N): _ Y    CALLED BY: Alonzo rosario  Organism: Enterococcus faecalis (10-05-18 @ 09:44)  Organism: Enterococcus faecalis (10-05-18 @ 09:44)    Sensitivities:      -  Ampicillin: S <=2 Predicts results to ampicillin/sulbactam, amoxacillin-clavulanate and  piperacillin-tazobactam.      -  Gentamicin synergy: S      -  Streptomycin synergy: S      -  Vancomycin: S 2      Method Type: JUSTICE    Culture - Blood (collected 10-03-18 @ 17:38)  Source: .Blood  Final Report (10-05-18 @ 09:45):    Growth in aerobic and anaerobic bottles: Enterococcus faecalis    Aerobic Bottle: 13:20 Hours to positivity    Anaerobic Bottle: 12:20 Hours to positivity    .    ***Blood Panel PCR results on this specimen are available    approximately 3 hours after the Gram stain result.***    Gram stain, PCR, and/or culture results may not always    correspond due to difference in methodologies.    ************************************************************    This PCR assay was performed using [employer].    The following targets are tested for: Enterococcus,    vancomycin resistant enterococci, Listeria monocytogenes,    coagulase negative staphylococci, S. aureus,    methicillin resistant S. aureus, Streptococcus agalactiae    (Group B), S. pneumoniae, S. pyogenes (Group A),    Acinetobacter baumannii, Enterobacter cloacae, E. coli,    Klebsiella oxytoca, K. pneumoniae, Proteus sp.,    Serratia marcescens, Haemophilus influenzae,    Neisseria meningitidis, Pseudomonas aeruginosa, Candida    albicans, C. glabrata, C krusei, C parapsilosis,    C. tropicalis and the KPC resistance gene.    "Due to technical problems, Proteus sp. will Not be reported as part of    the BCID panel until further notice"    .    TYPE: (C=Critical, N=Notification, A=Abnormal) C    TESTS:  _ Bld GS    DATE/TIME CALLED: _ 10/04/2018 10:27:10    CALLED TO: Alonzo SMITH (Nurse/ Dr. Temple office)    READ BACK (2 Patient Identifiers)(Y/N): _ Y    READ BACK VALUES (Y/N): _ Y    CALLED BY: Alonzo rosario  Organism: Enterococcus faecalis  Blood Culture PCR (10-05-18 @ 09:45)  Organism: Blood Culture PCR (10-05-18 @ 09:45)    Sensitivities:      -  Enterococcus species: Detec      Method Type: PCR  Organism: Enterococcus faecalis (10-05-18 @ 09:45)    Sensitivities:      -  Ampicillin: S <=2 Predicts results to ampicillin/sulbactam, amoxacillin-clavulanate and  piperacillin-tazobactam.      -  Gentamicin synergy: S      -  Streptomycin synergy: S      -  Vancomycin: S 2      Method Type: JUSTICE

## 2018-10-08 NOTE — PROGRESS NOTE ADULT - SUBJECTIVE AND OBJECTIVE BOX
[facility], [facility]                                   [address] [place] [address]                                                      PHONE: [phone]                                                         FAX: [phone]  -------------------------------------------------------------------------------------------------------------------------------    ALINE completed    No vegetations noted    Full report to follow. [facility], [facility]                                   [address] [address]                                                      PHONE: [phone]                                                         FAX: [phone]  -------------------------------------------------------------------------------------------------    ALINE completed    No vegetations noted    Full report to follow.

## 2018-10-08 NOTE — PROGRESS NOTE ADULT - ASSESSMENT
This is 73 y/o woman with PMH of HER-2 positive breast cancer,  initial right breast involvement and also involvement of the left breast. Pt had a longstanding PICC line for infusion of recent chemotherapy of Poziotinib. On 9/7/18, there was an infection of the PICC line and it was removed.  She was given a course of Keflex and on 9/14/18, a midline was placed.  patient recently developed drainage under the dressing of the mid line. mid line was not working properly and  It was removed 10/3/18; Blood cultures x 2 sets were sent and 4 of 4 bottles are growing enterococcus. Of note, patient prior PICC tip grew out pan-susceptible Pseudomonas. Pt. reports that she was diagnosed with DVT in her RUE in 2015, was started on Xarelto about 8 months later on repeat doppler no blood clot was found but her oncologist kept her on xeralto. Pt was admitted for anemia, no obvious source of bleeding, s/p PRBC, seen by ID, Hematology and GI.    A/P    >Anemia No obvious source of bleeding  pt's anemia is likely of chronic disease,, no overt gi bleed.   s/p prbc transfusion, H&h improved  appreciate Gi input - No need of EGd/colonoscopy  She had EGD and colonoscopy performed less than 1 year ago.   start IV Venofer  f/u cbc  Pt has h/o old DVT 2015 - was on Xarelto - per hematology has been on Xarelto 20mg QD, in view of Breast Ca and on active therapy would recommend continue Xarelto at reduced dose of 10mg    >Enterococcus bacteremia   appreciate ID input - stop Vancomycin, start AMPICILLIN 2 GRAMS Q4H,  CEFTRIAXONE 2 GRAMS Q12H   BOTH for empiric endocarditis coverage  TTE - LVEF of 65-70%  cardiology consult appreciated - s/p ALINE negative for IE  f/u repeat blood c/s negative so far     >Hypomagnesemia - resolved    >h/o breast cancer - appreciate hematology input - c/w medication as per hematology     >h/o DVT diagnosed in 2015   appreciate hematology - has been on Xarelto 20mg QD in view of Breast Ca and on active therapy would recommend continue Xarelto at reduced dose of 10mg - start today    >HTN - stable  c/w amlodipine     >DVT PPX - SCD

## 2018-10-08 NOTE — PROGRESS NOTE ADULT - SUBJECTIVE AND OBJECTIVE BOX
Internal Medicine Hospitalist - Dr. Damien PARKS    [id number]    72y      Female    Patient is a 72y old  Female who presents with a chief complaint of abnormal labs (08 Oct 2018 09:09)    INTERVAL HPI/ OVERNIGHT EVENTS: Patient is seen and examined, s/p ALINE negative for IE, denied chest pain, SOB, abd. pain, nausea and vomiting    REVIEW OF SYSTEMS:    Denied fever, chills, abd. pain, nausea, vomiting, chest pain, SOB, headache, dizziness    PHYSICAL EXAM:    Vital Signs Last 24 Hrs  T(C): 36.8 (08 Oct 2018 11:36), Max: 37 (07 Oct 2018 15:00)  T(F): 98.2 (08 Oct 2018 11:36), Max: 98.6 (07 Oct 2018 15:00)  HR: 88 (08 Oct 2018 11:36) (79 - 90)  BP: 120/60 (08 Oct 2018 11:36) (115/60 - 122/64)  BP(mean): --  RR: 18 (08 Oct 2018 11:36) (18 - 18)  SpO2: 98% (08 Oct 2018 11:36) (96% - 98%)    GENERAL: NAD  CHEST/LUNG: CTA b/l   HEART: S1S2+ audible  ABDOMEN: Soft, Nontender, Nondistended; Bowel sounds present  EXTREMITIES:  no edema  CNS: AAO X 3      LABS:                        9.9    8.1   )-----------( 228      ( 07 Oct 2018 08:26 )             32.7     10-07    140  |  102  |  12.0  ----------------------------<  85  4.1   |  26.0  |  0.56    Ca    9.6      07 Oct 2018 08:26  Mg     1.8     10-07              MEDICATIONS  (STANDING):  amLODIPine   Tablet 5 milliGRAM(s) Oral daily  ampicillin  IVPB      ampicillin  IVPB 2 Gram(s) IV Intermittent every 4 hours  cefTRIAXone   IVPB 2 Gram(s) IV Intermittent every 12 hours  cefTRIAXone   IVPB      iron sucrose IVPB 200 milliGRAM(s) IV Intermittent every 24 hours    MEDICATIONS  (PRN):  acetaminophen   Tablet .. 650 milliGRAM(s) Oral every 6 hours PRN Moderate Pain (4 - 6)      RADIOLOGY & ADDITIONAL TEST

## 2018-10-08 NOTE — PROGRESS NOTE ADULT - SUBJECTIVE AND OBJECTIVE BOX
· Subjective and Objective: 	  HPI: Patient is a 72-year old female seen on FU of metastatic breast cancer, Her-2 positive, heavily treated. Diagnosed in 2015  after presenting with right-sided pleural effusion , weight loss, extensive adenopathy and bilateral breast cancer, ER/ME, Her-2 pos.  Currently on study drug Poziotinib, an oral pan Her-2 inhibitor.  She has active chest wall disease and lung involvement.  She has SVC Sx with dilated veins on chest wall and obstructed veins in neck.  Has had multiple PICC lines LUE; recently discontinued because of infection; midline placed after 3-4 days on po antibiotics; Midline infected; d/c'd yesterday; given po Keflex; Bl Cx growing enterococcus species.  Instructed to come to ER for treatment.  has no fevers, chills or sweats, nausea, vomiting or abdominal; pain.  Has diarrhea from study drug; drug on hold  Also anemic, with fall in Hb without reported bleeding, but serum ferritin is low.  She has been on long term steroids.  has known avascular necrosis of hip. Not considered surgical candidate.      Doing better  afebrile, no N/V; no new complaints     PAST MEDICAL & SURGICAL HISTORY:  Essential hypertension  Lymphedema  PICC (peripherally inserted central catheter) in place  DVT of upper extremity (deep vein thrombosis): RIGHT  Breast cancer  S/P thoracentesis: 3/2016  S/P biopsy: R breast  PICC (peripherally inserted central catheter) in place      REVIEW OF SYSTEMS      General:Fatigued	    Skin/Breast:Dilated veins on chest and abdominal wall, resolving rash  	  Ophthalmologic:no double visio  	  ENMT:	no sore throat    Respiratory and Thorax:ANGEL  	  Cardiovascular:	no chest pain or palpitations    Gastrointestinal:	No abdominal pain, nausea or vomiting    Genitourinary:	no dysuria    Musculoskeletal:	left hip pain    Neurological:	no tremor  	    MEDICATIONS  (STANDING):  vancomycin  IVPB 1000 milliGRAM(s) IV Intermittent once    MEDICATIONS  (PRN):        SOCIAL HISTORY:    Smoking Status:denied  Alcohol:Denied  Marital Status:  Occupation:not working    FAMILY HISTORY:  Family history of breast cancer in first degree relative (Sibling): s/p mastectomy  Family history of lung cancer  Family history of cancer of frontal sinus: followed by enucleation      Vital Signs Last 24 Hrs  T(C): 36.8 (08 Oct 2018 15:28), Max: 37 (07 Oct 2018 22:36)  T(F): 98.3 (08 Oct 2018 15:28), Max: 98.6 (07 Oct 2018 22:36)  HR: 86 (08 Oct 2018 18:53) (79 - 96)  BP: 130/65 (08 Oct 2018 18:53) (115/60 - 130/65)  BP(mean): --  RR: 17 (08 Oct 2018 15:28) (17 - 18)  SpO2: 98% (08 Oct 2018 11:36) (97% - 98%)      PHYSICAL EXAM:      Constitutional: no acute distress     Eyes:anicteric, pale    ENMT:no lesion    Neck:Dilated neck veins    Breasts:right breast contractyed with tumor, with nodularuity in chest wall, mild erythema; left breast without palp mass        Respiratory:Clear    Cardiovascular:RRR normal S1S2    Gastrointestinal:Soft, non-tender            Extremities:Bilateral UE lymphedema    Skin:Dilated veins on chest and abdominal wall      LABS:             CBC Full  -  ( 07 Oct 2018 08:26 )  WBC Count : 8.1 K/uL  Hemoglobin : 9.9 g/dL  Hematocrit : 32.7 %  Platelet Count - Automated : 228 K/uL  Mean Cell Volume : 87.7 fl  Mean Cell Hemoglobin : 26.5 pg  Mean Cell Hemoglobin Concentration : 30.3 g/dL  Auto Neutrophil # : x  Auto Lymphocyte # : x  Auto Monocyte # : x  Auto Eosinophil # : x  Auto Basophil # : x  Auto Neutrophil % : x  Auto Lymphocyte % : x  Auto Monocyte % : x  Auto Eosinophil % : x  Auto Basophil % : x    07 Oct 2018 08:26    140    |  102    |  12.0   ----------------------------<  85     4.1     |  26.0   |  0.56     Ca    9.6        07 Oct 2018 08:26  Mg     1.8       07 Oct 2018 08:26      < from: Xray Chest 1 View-PORTABLE IMMEDIATE (10.04.18 @ 22:16) >   EXAM:  XR CHEST PORTABLE IMMED 1V                          PROCEDURE DATE:  10/04/2018          INTERPRETATION:  TECHNIQUE: Single portable view of the chest.    COMPARISON: 9/14/2016    CLINICAL HISTORY: Severe anemia.     FINDINGS:    Single frontal view of the chest demonstrates tiny bilateral pleural   effusions, improved. The cardiomediastinal silhouette is normal. No acute   osseous abnormalities. Overlying EKG leads and wires are noted.    IMPRESSION: Improvement tiny bilateral pleural effusion    < end of copied text >      Assessment and Plan:   		  Imp: 1.  Metastatic ER,ME, Her-2 pos breast cancer, on oral pan Her-2 inhibitor, admitted with bacteremia secondary to midline infection, growing enterococcus;      Anemia - normocytic  Is s/p 2 units PRBC    -GI eval noted.  No plan for repeat EGD/colonoscopy as she had it done 1 yr ago     Enterococcus bacteremia    Anemia of chronic disease, malignancy and infection.  -clinically improving  -ID following; on IV Abx  -only peripheral line in place; midline d/c'd    Breast Ca  -on study oral drug - Poziotinib  -plan to resume as outpt     RUE DVT 2015  -has been on Xarelto 20mg QD  -in view of Breast Ca and on active therapy would recommend continue Xarelto at reduced dose of 10mg   -Xarelto to start after ALINE tomorrow

## 2018-10-09 LAB
ANION GAP SERPL CALC-SCNC: 9 MMOL/L — SIGNIFICANT CHANGE UP (ref 5–17)
BUN SERPL-MCNC: 13 MG/DL — SIGNIFICANT CHANGE UP (ref 8–20)
CALCIUM SERPL-MCNC: 9.1 MG/DL — SIGNIFICANT CHANGE UP (ref 8.6–10.2)
CHLORIDE SERPL-SCNC: 102 MMOL/L — SIGNIFICANT CHANGE UP (ref 98–107)
CO2 SERPL-SCNC: 29 MMOL/L — SIGNIFICANT CHANGE UP (ref 22–29)
CREAT SERPL-MCNC: 0.53 MG/DL — SIGNIFICANT CHANGE UP (ref 0.5–1.3)
GLUCOSE SERPL-MCNC: 100 MG/DL — SIGNIFICANT CHANGE UP (ref 70–115)
HCT VFR BLD CALC: 26.7 % — LOW (ref 37–47)
HGB BLD-MCNC: 7.8 G/DL — LOW (ref 12–16)
MAGNESIUM SERPL-MCNC: 1.5 MG/DL — LOW (ref 1.6–2.6)
MCHC RBC-ENTMCNC: 26 PG — LOW (ref 27–31)
MCHC RBC-ENTMCNC: 29.2 G/DL — LOW (ref 32–36)
MCV RBC AUTO: 89 FL — SIGNIFICANT CHANGE UP (ref 81–99)
PLATELET # BLD AUTO: 219 K/UL — SIGNIFICANT CHANGE UP (ref 150–400)
POTASSIUM SERPL-MCNC: 3.7 MMOL/L — SIGNIFICANT CHANGE UP (ref 3.5–5.3)
POTASSIUM SERPL-SCNC: 3.7 MMOL/L — SIGNIFICANT CHANGE UP (ref 3.5–5.3)
RBC # BLD: 3 M/UL — LOW (ref 4.4–5.2)
RBC # FLD: 18 % — HIGH (ref 11–15.6)
SODIUM SERPL-SCNC: 140 MMOL/L — SIGNIFICANT CHANGE UP (ref 135–145)
WBC # BLD: 7.3 K/UL — SIGNIFICANT CHANGE UP (ref 4.8–10.8)
WBC # FLD AUTO: 7.3 K/UL — SIGNIFICANT CHANGE UP (ref 4.8–10.8)

## 2018-10-09 PROCEDURE — 36558 INSERT TUNNELED CV CATH: CPT

## 2018-10-09 PROCEDURE — 99232 SBSQ HOSP IP/OBS MODERATE 35: CPT

## 2018-10-09 PROCEDURE — 76937 US GUIDE VASCULAR ACCESS: CPT | Mod: 26

## 2018-10-09 PROCEDURE — 77001 FLUOROGUIDE FOR VEIN DEVICE: CPT | Mod: 26

## 2018-10-09 RX ORDER — MAGNESIUM SULFATE 500 MG/ML
2 VIAL (ML) INJECTION ONCE
Qty: 0 | Refills: 0 | Status: COMPLETED | OUTPATIENT
Start: 2018-10-09 | End: 2018-10-09

## 2018-10-09 RX ORDER — POTASSIUM CHLORIDE 20 MEQ
20 PACKET (EA) ORAL ONCE
Qty: 0 | Refills: 0 | Status: COMPLETED | OUTPATIENT
Start: 2018-10-09 | End: 2018-10-09

## 2018-10-09 RX ADMIN — RIVAROXABAN 10 MILLIGRAM(S): KIT at 16:19

## 2018-10-09 RX ADMIN — CEFTRIAXONE 100 GRAM(S): 500 INJECTION, POWDER, FOR SOLUTION INTRAMUSCULAR; INTRAVENOUS at 11:31

## 2018-10-09 RX ADMIN — Medication 216 GRAM(S): at 05:00

## 2018-10-09 RX ADMIN — Medication 50 GRAM(S): at 10:24

## 2018-10-09 RX ADMIN — AMLODIPINE BESYLATE 5 MILLIGRAM(S): 2.5 TABLET ORAL at 07:30

## 2018-10-09 RX ADMIN — IRON SUCROSE 110 MILLIGRAM(S): 20 INJECTION, SOLUTION INTRAVENOUS at 17:05

## 2018-10-09 RX ADMIN — Medication 650 MILLIGRAM(S): at 00:54

## 2018-10-09 RX ADMIN — Medication 650 MILLIGRAM(S): at 12:17

## 2018-10-09 RX ADMIN — Medication 216 GRAM(S): at 08:55

## 2018-10-09 RX ADMIN — Medication 216 GRAM(S): at 16:19

## 2018-10-09 RX ADMIN — Medication 216 GRAM(S): at 12:07

## 2018-10-09 RX ADMIN — CEFTRIAXONE 100 GRAM(S): 500 INJECTION, POWDER, FOR SOLUTION INTRAMUSCULAR; INTRAVENOUS at 23:45

## 2018-10-09 RX ADMIN — Medication 216 GRAM(S): at 22:55

## 2018-10-09 RX ADMIN — CEFTRIAXONE 100 GRAM(S): 500 INJECTION, POWDER, FOR SOLUTION INTRAMUSCULAR; INTRAVENOUS at 00:55

## 2018-10-09 RX ADMIN — Medication 650 MILLIGRAM(S): at 12:00

## 2018-10-09 RX ADMIN — Medication 20 MILLIEQUIVALENT(S): at 10:28

## 2018-10-09 NOTE — PROGRESS NOTE ADULT - ASSESSMENT
This is 71 y/o woman with PMH of HER-2 positive breast cancer,  initial right breast involvement and also involvement of the left breast. Pt had a longstanding PICC line for infusion of recent chemotherapy of Poziotinib. On 9/7/18, there was an infection of the PICC line and it was removed.  She was given a course of Keflex and on 9/14/18, a midline was placed.  patient recently developed drainage under the dressing of the mid line. mid line was not working properly and  It was removed 10/3/18; Blood cultures x 2 sets were sent and 4 of 4 bottles are growing enterococcus. Of note, patient prior PICC tip grew out pan-susceptible Pseudomonas. Pt. reports that she was diagnosed with DVT in her RUE in 2015, was started on Xarelto about 8 months later on repeat doppler no blood clot was found but her oncologist kept her on xeralto. Pt was admitted for anemia, no obvious source of bleeding, s/p PRBC, seen by ID, Hematology and GI.    A/P    >Anemia No obvious source of bleeding  pt's anemia is likely of chronic disease,, no overt gi bleed.   Hg today 7.8 from 9.9 - f/u cbc  s/p prbc transfusion,   appreciate Gi input - No need of EGd/colonoscopy  She had EGD and colonoscopy performed less than 1 year ago.   c/w IV Venofer  f/u cbc  Pt has h/o old DVT 2015 - was on Xarelto - per hematology has been on Xarelto 20mg QD, in view of Breast Ca and on active therapy would recommend continue Xarelto at reduced dose of 10mg    >Enterococcus bacteremia   appreciate ID input - c/w AMPICILLIN 2 GRAMS Q4H,  CEFTRIAXONE 2 GRAMS Q12H - end date 10/18- s/p PICC line  BOTH for empiric endocarditis coverage  TTE - LVEF of 65-70%  cardiology consult appreciated - s/p ALINE negative for IE  f/u repeat blood c/s negative so far     >Hypomagnesemia - supplemented    >h/o breast cancer - appreciate hematology input - c/w medication as per hematology     >h/o DVT diagnosed in 2015   appreciate hematology - has been on Xarelto 20mg QD in view of Breast Ca and on active therapy would recommend continue Xarelto at reduced dose of 10mg - started on 10/09    >HTN - stable  c/w amlodipine     >DVT PPX - SCD

## 2018-10-09 NOTE — PROGRESS NOTE ADULT - ATTENDING COMMENTS
discussed with her  present bedside, updated
OK to discharge patient after antibiotics arranged.   patient to follow in my office in 2-3  weeks.

## 2018-10-09 NOTE — PROGRESS NOTE ADULT - ASSESSMENT
This 72 y.o. F with breast cancer undergoing chemotherapy, prior PICC line infection, and now here with mid line infection, s/p removal, blood cultures with Enterococcus faecalis bacteremia.     - repeat cultures in ER 10/4/18 positive for E faecalis  - repeat cultures x 2 sets on 10/5/18 NEGATIVE     Enterococcus SS to all tested agents  -  AMPICILLIN 2 GRAMS Q4H   -  CEFTRIAXONE 2 GRAMS Q12H   - anticipate 14 day course, ends on 10/18/18, 9 MORE DAYS  - s/p PICC

## 2018-10-09 NOTE — PROGRESS NOTE ADULT - SUBJECTIVE AND OBJECTIVE BOX
[facility]  INFECTIOUS DISEASES AND INTERNAL MEDICINE at [place]  =======================================================  Murray Duncan MD  Diplomates American Board of Internal Medicine and Infectious Diseases  =======================================================    DOMINIK PARKS [id number]  chief complaint: follow up on Enterococcus bacteremia  patient seen and examined in follow up.  Chart and labs reviewed.     Blood cultures from 10/5/18 negative  prior with Enterococcus faecalis   ALINE negative for vegetations  s/p placement of PICC line in left SC    =======================================================  Allergies:  No Known Allergies      =======================================================  Antibiotics:  ampicillin  IVPB      ampicillin  IVPB 2 Gram(s) IV Intermittent every 4 hours  cefTRIAXone   IVPB 2 Gram(s) IV Intermittent every 12 hours  cefTRIAXone   IVPB       =======================================================     REVIEW OF SYSTEMS:  as above  all other ROS are negative    =======================================================    Physical Exam:  Vital Signs Last 24 Hrs  T(C): 37.2 (09 Oct 2018 10:07), Max: 37.4 (08 Oct 2018 22:20)  T(F): 99 (09 Oct 2018 10:07), Max: 99.3 (08 Oct 2018 22:20)  HR: 107 (09 Oct 2018 10:07) (86 - 107)  BP: 107/60 (09 Oct 2018 10:07) (107/60 - 130/65)  RR: 18 (09 Oct 2018 10:07) (17 - 18)  SpO2: 97% (09 Oct 2018 04:53) (97% - 97%)    General:  No acute distress.  Eye: Pupils are equal, round and reactive to light, Extraocular movements are intact, Normal conjunctiva.  HENT: Normocephalic, Oral mucosa is moist, No pharyngeal erythema, No sinus tenderness.  Neck: Supple, No lymphadenopathy.  Respiratory: Lungs are clear to auscultation, Respirations are non-labored.  Cardiovascular: Normal rate, Regular rhythm, No murmur, Good pulses equal in all extremities,  Right UPPER ext with compression hosiery.   Gastrointestinal: Soft, Non-tender, Non-distended, Normal bowel sounds.  Genitourinary: No costovertebral angle tenderness.  Lymphatics: No lymphadenopathy neck,   Musculoskeletal: Normal range of motion, Normal strength.  Integumentary: No rash.  Neurologic: Alert, Oriented, No focal deficits, Cranial Nerves II-XII are grossly intact.  Psychiatric: Appropriate mood & affect.    =======================================================  Labs:                        7.8    7.3   )-----------( 219      ( 09 Oct 2018 06:01 )             26.7     10-09    140  |  102  |  13.0  ----------------------------<  100  3.7   |  29.0  |  0.53    Ca    9.1      09 Oct 2018 06:01  Mg     1.5     10-09          Culture - Blood (collected 10-05-18 @ 10:20)  Source: .Blood Blood    Culture - Blood (collected 10-05-18 @ 10:20)  Source: .Blood Blood    Culture - Blood (collected 10-04-18 @ 17:14)  Source: .Blood Blood  Final Report (10-06-18 @ 10:44):    Growth in anaerobic bottle: Enterococcus faecalis    Anaerobic Bottle: 13:59 Hours to positivity    ,    TYPE: (C=Critical, N=Notification, A=Abnormal) c    TESTS:  _ gs    DATE/TIME CALLED: _ 10/05/2018 09:47:57    CALLED TO: Alonzo yu rn    READ BACK (2 Patient Identifiers)(Y/N): _ y    READ BACK VALUES (Y/N): _ y    CALLED BY: Alonzo amato  Organism: Enterococcus faecalis (10-06-18 @ 10:44)  Organism: Enterococcus faecalis (10-06-18 @ 10:44)    Sensitivities:      -  Ampicillin: S <=2 Predicts results to ampicillin/sulbactam, amoxacillin-clavulanate and  piperacillin-tazobactam.      -  Gentamicin synergy: S      -  Streptomycin synergy: S      -  Vancomycin: S 2      Method Type: JUSTICE    Culture - Blood (collected 10-04-18 @ 17:13)  Source: .Blood Blood  Final Report (10-06-18 @ 10:44):    Growth in aerobic and anaerobic bottles: Enterococcus faecalis    Aerobic Bottle: 13:20 Hours to positivity    Anaerobic Bottle: 12:24 Hours to positivity    ,    c c    TESTS:  _ gs    DATE/TIME CALLED: _ 10/05/2018 09:45:51    CALLED TO: Alonzo yu rn    READ BACK (2 Patient Identifiers)(Y/N): _ y    READ BACK VALUES (Y/N): _ y    CALLED BY: Alonzo amato  Organism: Enterococcus faecalis (10-06-18 @ 10:44)  Organism: Enterococcus faecalis (10-06-18 @ 10:44)    Sensitivities:      -  Ampicillin: S <=2 Predicts results to ampicillin/sulbactam, amoxacillin-clavulanate and  piperacillin-tazobactam.      -  Gentamicin synergy: S      -  Streptomycin synergy: S      -  Vancomycin: S 2      Method Type: JUSTICE    Culture - Blood (collected 10-03-18 @ 17:38)  Source: .Blood  Final Report (10-05-18 @ 09:44):    Growth in aerobic and anaerobic bottles: Enterococcus faecalis    Aerobic Bottle: 13:10 Hours to positivity    Anaerobic Bottle: 13:50 Hours to positivity    .    TYPE: (C=Critical, N=Notification, A=Abnormal) C    TESTS:  _ Bld     DATE/TIME CALLED: _ 10/04/2018 10:31:22    CALLED TO: Alonzo SMITH ( Nurse/ Dr Temple Office)    READ BACK (2 Patient Identifiers)(Y/N): _ Y    READ BACK VALUES (Y/N): _ Y    CALLED BY: Alonzo rosario  Organism: Enterococcus faecalis (10-05-18 @ 09:44)  Organism: Enterococcus faecalis (10-05-18 @ 09:44)    Sensitivities:      -  Ampicillin: S <=2 Predicts results to ampicillin/sulbactam, amoxacillin-clavulanate and  piperacillin-tazobactam.      -  Gentamicin synergy: S      -  Streptomycin synergy: S      -  Vancomycin: S 2      Method Type: JUSTICE    Culture - Blood (collected 10-03-18 @ 17:38)  Source: .Blood  Final Report (10-05-18 @ 09:45):    Growth in aerobic and anaerobic bottles: Enterococcus faecalis    Aerobic Bottle: 13:20 Hours to positivity    Anaerobic Bottle: 12:20 Hours to positivity    .    ***Blood Panel PCR results on this specimen are available    approximately 3 hours after the Gram stain result.***    Gram stain, PCR, and/or culture results may not always    correspond due to difference in methodologies.    ************************************************************    This PCR assay was performed using [employer].    The following targets are tested for: Enterococcus,    vancomycin resistant enterococci, Listeria monocytogenes,    coagulase negative staphylococci, S. aureus,    methicillin resistant S. aureus, Streptococcus agalactiae    (Group B), S. pneumoniae, S. pyogenes (Group A),    Acinetobacter baumannii, Enterobacter cloacae, E. coli,    Klebsiella oxytoca, K. pneumoniae, Proteus sp.,    Serratia marcescens, Haemophilus influenzae,    Neisseria meningitidis, Pseudomonas aeruginosa, Candida    albicans, C. glabrata, C krusei, C parapsilosis,    C. tropicalis and the KPC resistance gene.    "Due to technical problems, Proteus sp. will Not be reported as part of    the BCID panel until further notice"    .    TYPE: (C=Critical, N=Notification, A=Abnormal) C    TESTS:  _ Bld GS    DATE/TIME CALLED: _ 10/04/2018 10:27:10    CALLED TO: Alonzo SMITH (Nurse/ Dr. Temple office)    READ BACK (2 Patient Identifiers)(Y/N): _ Y    READ BACK VALUES (Y/N): _ Y    CALLED BY: Alonzo rosario  Organism: Enterococcus faecalis  Blood Culture PCR (10-05-18 @ 09:45)  Organism: Blood Culture PCR (10-05-18 @ 09:45)    Sensitivities:      -  Enterococcus species: Detec      Method Type: PCR  Organism: Enterococcus faecalis (10-05-18 @ 09:45)    Sensitivities:      -  Ampicillin: S <=2 Predicts results to ampicillin/sulbactam, amoxacillin-clavulanate and  piperacillin-tazobactam.      -  Gentamicin synergy: S      -  Streptomycin synergy: S      -  Vancomycin: S 2      Method Type: JUSTICE

## 2018-10-09 NOTE — PROGRESS NOTE ADULT - SUBJECTIVE AND OBJECTIVE BOX
Internal Medicine Hospitalist - Dr. Damien PARKS    [id number]    72y      Female    Patient is a 72y old  Female who presents with a chief complaint of abnormal labs (08 Oct 2018 20:56)    INTERVAL HPI/ OVERNIGHT EVENTS: Patient is seen and examined, denied chest pain, SOB, fever, chills, abdominal pain, nausea and vomiting    REVIEW OF SYSTEMS:    Denied fever, chills, abd. pain, nausea, vomiting, chest pain, SOB, headache, dizziness    PHYSICAL EXAM:    Vital Signs Last 24 Hrs  T(C): 37.2 (09 Oct 2018 10:07), Max: 37.4 (08 Oct 2018 22:20)  T(F): 99 (09 Oct 2018 10:07), Max: 99.3 (08 Oct 2018 22:20)  HR: 107 (09 Oct 2018 10:07) (86 - 107)  BP: 107/60 (09 Oct 2018 10:07) (107/60 - 130/65)  BP(mean): --  RR: 18 (09 Oct 2018 10:07) (17 - 18)  SpO2: 97% (09 Oct 2018 04:53) (97% - 98%)    GENERAL: NAD  CHEST/LUNG: CTA b/l   HEART: S1S2+ audible  ABDOMEN: Soft, Nontender, Nondistended; Bowel sounds present  CNS: AAO X 3    LABS:                        7.8    7.3   )-----------( 219      ( 09 Oct 2018 06:01 )             26.7     10-09    140  |  102  |  13.0  ----------------------------<  100  3.7   |  29.0  |  0.53    Ca    9.1      09 Oct 2018 06:01  Mg     1.5     10-09              MEDICATIONS  (STANDING):  amLODIPine   Tablet 5 milliGRAM(s) Oral daily  ampicillin  IVPB      ampicillin  IVPB 2 Gram(s) IV Intermittent every 4 hours  cefTRIAXone   IVPB 2 Gram(s) IV Intermittent every 12 hours  cefTRIAXone   IVPB      iron sucrose IVPB 200 milliGRAM(s) IV Intermittent every 24 hours  magnesium sulfate  IVPB 2 Gram(s) IV Intermittent once  potassium chloride    Tablet ER 20 milliEquivalent(s) Oral once  rivaroxaban 10 milliGRAM(s) Oral every 24 hours    MEDICATIONS  (PRN):  acetaminophen   Tablet .. 650 milliGRAM(s) Oral every 6 hours PRN Moderate Pain (4 - 6)      RADIOLOGY & ADDITIONAL TEST

## 2018-10-09 NOTE — PROGRESS NOTE ADULT - SUBJECTIVE AND OBJECTIVE BOX
· Subjective and Objective: 	  HPI: Patient is a 72-year old female seen on FU of metastatic breast cancer, Her-2 positive, heavily treated. Diagnosed in 2015  after presenting with right-sided pleural effusion , weight loss, extensive adenopathy and bilateral breast cancer, ER/NC, Her-2 pos.  Currently on study drug Poziotinib, an oral pan Her-2 inhibitor.  She has active chest wall disease and lung involvement.  She has SVC Sx with dilated veins on chest wall and obstructed veins in neck.  Has had multiple PICC lines LUE; recently discontinued because of infection; midline placed after 3-4 days on po antibiotics; Midline infected; d/c'd yesterday; given po Keflex; Bl Cx growing enterococcus species.  Instructed to come to ER for treatment.  has no fevers, chills or sweats, nausea, vomiting or abdominal; pain.  Has diarrhea from study drug; drug on hold  Also anemic, with fall in Hb without reported bleeding, but serum ferritin is low.  She has been on long term steroids.  has known avascular necrosis of hip. Not considered surgical candidate.     INTERVAL Hx   Doing better  afebrile, no N/V; no new complaints . no bleeding. chest wall lesion is smaller    PAST MEDICAL & SURGICAL HISTORY:  Essential hypertension  Lymphedema  PICC (peripherally inserted central catheter) in place  DVT of upper extremity (deep vein thrombosis): RIGHT  Breast cancer  S/P thoracentesis: 3/2016  S/P biopsy: R breast  PICC (peripherally inserted central catheter) in place      MEDICATIONS  (STANDING):  amLODIPine   Tablet 5 milliGRAM(s) Oral daily  ampicillin  IVPB      ampicillin  IVPB 2 Gram(s) IV Intermittent every 4 hours  cefTRIAXone   IVPB 2 Gram(s) IV Intermittent every 12 hours  cefTRIAXone   IVPB      iron sucrose IVPB 200 milliGRAM(s) IV Intermittent every 24 hours  rivaroxaban 10 milliGRAM(s) Oral every 24 hours    MEDICATIONS  (PRN):    Vital Signs Last 24 Hrs  T(C): 37.2 (09 Oct 2018 10:07), Max: 37.4 (08 Oct 2018 22:20)  T(F): 99 (09 Oct 2018 10:07), Max: 99.3 (08 Oct 2018 22:20)  HR: 107 (09 Oct 2018 10:07) (86 - 107)  BP: 107/60 (09 Oct 2018 10:07) (107/60 - 130/65)  BP(mean): --  RR: 18 (09 Oct 2018 10:07) (17 - 18)  SpO2: 97% (09 Oct 2018 04:53) (97% - 97%)      PHYSICAL EXAM:      Constitutional: no acute distress     Eyes:anicteric, pale    ENMT:no lesion    Neck:Dilated neck veins    Breasts:right breast contractyed with tumor, with nodularuity in chest wall, mild erythema; left breast without palp mass        Respiratory:Clear    Cardiovascular:RRR normal S1S2    Gastrointestinal:Soft, non-tender            Extremities:Bilateral UE lymphedema    Skin:Dilated veins on chest and abdominal wall                  7.8                  140  | 29.0 | 13.0         7.3   >-----------< 219     ------------------------< 100                   26.7                 3.7  | 102  | 0.53                                         Ca 9.1   Mg 1.5   Ph x        < end of copied text >      Assessment and Plan:   		  Imp: 1.  Metastatic ER,NC, Her-2 pos breast cancer, on oral pan Her-2 inhibitor, admitted with bacteremia secondary to midline infection, growing enterococcus;      Anemia - normocytic  Is s/p 2 units PRBC    -GI eval noted.  No plan for repeat EGD/colonoscopy as she had it done 1 yr ago . continue on IV iron    Enterococcus bacteremia: on abx above        Breast Ca  -on study oral drug - Poziotinib  -plan to resume as outpt     RUE DVT 2015: is now on xarelto 10mg daily.

## 2018-10-10 LAB
ANION GAP SERPL CALC-SCNC: 12 MMOL/L — SIGNIFICANT CHANGE UP (ref 5–17)
BUN SERPL-MCNC: 10 MG/DL — SIGNIFICANT CHANGE UP (ref 8–20)
CALCIUM SERPL-MCNC: 9.6 MG/DL — SIGNIFICANT CHANGE UP (ref 8.6–10.2)
CHLORIDE SERPL-SCNC: 102 MMOL/L — SIGNIFICANT CHANGE UP (ref 98–107)
CO2 SERPL-SCNC: 28 MMOL/L — SIGNIFICANT CHANGE UP (ref 22–29)
CREAT SERPL-MCNC: 0.52 MG/DL — SIGNIFICANT CHANGE UP (ref 0.5–1.3)
CULTURE RESULTS: SIGNIFICANT CHANGE UP
CULTURE RESULTS: SIGNIFICANT CHANGE UP
GLUCOSE SERPL-MCNC: 179 MG/DL — HIGH (ref 70–115)
HCT VFR BLD CALC: 29 % — LOW (ref 37–47)
HGB BLD-MCNC: 8.3 G/DL — LOW (ref 12–16)
MAGNESIUM SERPL-MCNC: 1.8 MG/DL — SIGNIFICANT CHANGE UP (ref 1.6–2.6)
MCHC RBC-ENTMCNC: 25.7 PG — LOW (ref 27–31)
MCHC RBC-ENTMCNC: 28.6 G/DL — LOW (ref 32–36)
MCV RBC AUTO: 89.8 FL — SIGNIFICANT CHANGE UP (ref 81–99)
PLATELET # BLD AUTO: 287 K/UL — SIGNIFICANT CHANGE UP (ref 150–400)
POTASSIUM SERPL-MCNC: 4 MMOL/L — SIGNIFICANT CHANGE UP (ref 3.5–5.3)
POTASSIUM SERPL-SCNC: 4 MMOL/L — SIGNIFICANT CHANGE UP (ref 3.5–5.3)
RBC # BLD: 3.23 M/UL — LOW (ref 4.4–5.2)
RBC # FLD: 18.1 % — HIGH (ref 11–15.6)
SODIUM SERPL-SCNC: 142 MMOL/L — SIGNIFICANT CHANGE UP (ref 135–145)
SPECIMEN SOURCE: SIGNIFICANT CHANGE UP
SPECIMEN SOURCE: SIGNIFICANT CHANGE UP
WBC # BLD: 8.4 K/UL — SIGNIFICANT CHANGE UP (ref 4.8–10.8)
WBC # FLD AUTO: 8.4 K/UL — SIGNIFICANT CHANGE UP (ref 4.8–10.8)

## 2018-10-10 PROCEDURE — 99232 SBSQ HOSP IP/OBS MODERATE 35: CPT

## 2018-10-10 RX ORDER — AMPICILLIN TRIHYDRATE 250 MG
2 CAPSULE ORAL
Qty: 108 | Refills: 0 | OUTPATIENT
Start: 2018-10-10 | End: 2018-10-18

## 2018-10-10 RX ORDER — MAGNESIUM SULFATE 500 MG/ML
1 VIAL (ML) INJECTION ONCE
Qty: 0 | Refills: 0 | Status: COMPLETED | OUTPATIENT
Start: 2018-10-10 | End: 2018-10-10

## 2018-10-10 RX ORDER — CEFTRIAXONE 500 MG/1
2 INJECTION, POWDER, FOR SOLUTION INTRAMUSCULAR; INTRAVENOUS
Qty: 18 | Refills: 0 | OUTPATIENT
Start: 2018-10-10 | End: 2018-10-18

## 2018-10-10 RX ORDER — AMPICILLIN TRIHYDRATE 250 MG
6 CAPSULE ORAL
Qty: 108 | Refills: 0 | OUTPATIENT
Start: 2018-10-10 | End: 2018-10-18

## 2018-10-10 RX ADMIN — Medication 216 GRAM(S): at 15:44

## 2018-10-10 RX ADMIN — Medication 216 GRAM(S): at 18:21

## 2018-10-10 RX ADMIN — RIVAROXABAN 10 MILLIGRAM(S): KIT at 18:19

## 2018-10-10 RX ADMIN — Medication 100 GRAM(S): at 19:23

## 2018-10-10 RX ADMIN — Medication 650 MILLIGRAM(S): at 07:00

## 2018-10-10 RX ADMIN — Medication 216 GRAM(S): at 02:01

## 2018-10-10 RX ADMIN — Medication 216 GRAM(S): at 12:21

## 2018-10-10 RX ADMIN — Medication 216 GRAM(S): at 21:28

## 2018-10-10 RX ADMIN — AMLODIPINE BESYLATE 5 MILLIGRAM(S): 2.5 TABLET ORAL at 06:08

## 2018-10-10 RX ADMIN — Medication 650 MILLIGRAM(S): at 18:19

## 2018-10-10 RX ADMIN — Medication 216 GRAM(S): at 06:08

## 2018-10-10 RX ADMIN — Medication 650 MILLIGRAM(S): at 06:15

## 2018-10-10 RX ADMIN — IRON SUCROSE 110 MILLIGRAM(S): 20 INJECTION, SOLUTION INTRAVENOUS at 20:45

## 2018-10-10 RX ADMIN — CEFTRIAXONE 100 GRAM(S): 500 INJECTION, POWDER, FOR SOLUTION INTRAMUSCULAR; INTRAVENOUS at 11:03

## 2018-10-10 NOTE — DIETITIAN INITIAL EVALUATION ADULT. - ETIOLOGY
related to inability to consume sufficient protein-energy associated with increased needs 2/2 metastatic breast cancer and bacteremia

## 2018-10-10 NOTE — PROGRESS NOTE ADULT - SUBJECTIVE AND OBJECTIVE BOX
Internal Medicine Hospitalist - Dr. Damien PARKS    [id number]    72y      Female    Patient is a 72y old  Female who presents with a chief complaint of abnormal labs (10 Oct 2018 11:04)    INTERVAL HPI/ OVERNIGHT EVENTS: Patient is seen and examined, no new event overnight.     REVIEW OF SYSTEMS:    Denied fever, chills, abd. pain, nausea, vomiting, chest pain, SOB, headache, dizziness    PHYSICAL EXAM:    Vital Signs Last 24 Hrs  T(C): 36.9 (10 Oct 2018 10:56), Max: 37.2 (10 Oct 2018 04:21)  T(F): 98.4 (10 Oct 2018 10:56), Max: 98.9 (10 Oct 2018 04:21)  HR: 97 (10 Oct 2018 10:56) (78 - 97)  BP: 112/59 (10 Oct 2018 10:56) (112/59 - 128/64)  BP(mean): --  RR: 18 (10 Oct 2018 10:56) (17 - 18)  SpO2: 100% (10 Oct 2018 10:56) (96% - 100%)    GENERAL: NAD  CHEST/LUNG: CTA b/l   HEART: S1S2+ audible  ABDOMEN: Soft, Nontender, Nondistended; Bowel sounds present  EXTREMITIES:  no edema  CNS: AAO X 3      LABS:                        8.3    8.4   )-----------( 287      ( 10 Oct 2018 10:13 )             29.0     10-10    142  |  102  |  10.0  ----------------------------<  179<H>  4.0   |  28.0  |  0.52    Ca    9.6      10 Oct 2018 10:13  Mg     1.8     10-10              MEDICATIONS  (STANDING):  amLODIPine   Tablet 5 milliGRAM(s) Oral daily  ampicillin  IVPB      ampicillin  IVPB 2 Gram(s) IV Intermittent every 4 hours  cefTRIAXone   IVPB 2 Gram(s) IV Intermittent every 12 hours  cefTRIAXone   IVPB      iron sucrose IVPB 200 milliGRAM(s) IV Intermittent every 24 hours  rivaroxaban 10 milliGRAM(s) Oral every 24 hours    MEDICATIONS  (PRN):  acetaminophen   Tablet .. 650 milliGRAM(s) Oral every 6 hours PRN Moderate Pain (4 - 6)      RADIOLOGY & ADDITIONAL TEST

## 2018-10-10 NOTE — DIETITIAN INITIAL EVALUATION ADULT. - NS FNS SUPPLEMENTS
TID to optimize po intake and provide an additional 350 kcal, 20g protein per serving/Ensure Enlive®

## 2018-10-10 NOTE — PROGRESS NOTE ADULT - ASSESSMENT
This is 73 y/o woman with PMH of HER-2 positive breast cancer,  initial right breast involvement and also involvement of the left breast. Pt had a longstanding PICC line for infusion of recent chemotherapy of Poziotinib. On 9/7/18, there was an infection of the PICC line and it was removed.  She was given a course of Keflex and on 9/14/18, a midline was placed.  patient recently developed drainage under the dressing of the mid line. mid line was not working properly and  It was removed 10/3/18; Blood cultures x 2 sets were sent and 4 of 4 bottles are growing enterococcus. Of note, patient prior PICC tip grew out pan-susceptible Pseudomonas. Pt. reports that she was diagnosed with DVT in her RUE in 2015, was started on Xarelto about 8 months later on repeat doppler no blood clot was found but her oncologist kept her on xeralto. Pt was admitted for anemia, no obvious source of bleeding, s/p PRBC, seen by ID, Hematology and GI.    A/P    >Anemia No obvious source of bleeding  pt's anemia is likely of chronic disease,, no overt gi bleed.   H&H improving, c/w IV Venofer -f/u cbc  s/p prbc transfusion,   appreciate Gi input - No need of EGd/colonoscopy  She had EGD and colonoscopy performed less than 1 year ago.   Pt has h/o old DVT 2015 - was on Xarelto - per hematology has been on Xarelto 20mg QD, in view of Breast Ca and on active therapy would recommend continue Xarelto at reduced dose of 10mg    >Enterococcus bacteremia   appreciate ID input - c/w AMPICILLIN 2 GRAMS Q4H,  CEFTRIAXONE 2 GRAMS Q12H - end date 10/18- s/p PICC line  BOTH for empiric endocarditis coverage  TTE - LVEF of 65-70%  cardiology consult appreciated - s/p ALINE negative for IE  f/u repeat blood c/s negative so far     >Hypomagnesemia - resolved    >h/o breast cancer - appreciate hematology input - c/w medication as per hematology     >h/o DVT diagnosed in 2015   appreciate hematology - has been on Xarelto 20mg QD in view of Breast Ca and on active therapy would recommend continue Xarelto at reduced dose of 10mg - started on 10/09    >HTN - stable  c/w amlodipine     >DVT PPX - SCD

## 2018-10-10 NOTE — DIETITIAN INITIAL EVALUATION ADULT. - PHYSICAL APPEARANCE
BMI 22.5 (WNL)- NFPE performed, physical findings include mild muscle loss of temples, clavicles, shoulders, and mild fat loss of orbitals and triceps

## 2018-10-10 NOTE — CHART NOTE - NSCHARTNOTEFT_GEN_A_CORE
Upon Nutritional Assessment by the Registered Dietitian your patient was determined to meet criteria / has evidence of the following diagnosis/diagnoses:          [ ]  Mild Protein Calorie Malnutrition        [x]  Moderate Protein Calorie Malnutrition        [ ] Severe Protein Calorie Malnutrition        [ ] Unspecified Protein Calorie Malnutrition        [ ] Underweight / BMI <19        [ ] Morbid Obesity / BMI > 40    Pt presents with malnutrition (moderate, chronic) related to inability to consume sufficient protein-energy associated with increased needs 2/2 metastatic breast cancer and bacteremia as evidenced by weight loss of 7.1% x ~3 month and mild muscle loss of temples, clavicles, shoulders, and mild fat loss of orbitals and triceps noted on NFPE.    Findings as based on:  •  Comprehensive nutrition assessment and consultation  •  Calorie counts (nutrient intake analysis)  •  Food acceptance and intake status from observations by staff  •  Follow up  •  Patient education  •  Intervention secondary to interdisciplinary rounds  •   concerns      Treatment:    The following diet has been recommended: Continue current diet. Recommend Ensure Enlive TID to optimize po intake and provide an additional 350 kcal, 20g protein per serving      PROVIDER Section:     By signing this assessment you are acknowledging and agree with the diagnosis/diagnoses assigned by the Registered Dietitian    Comments:

## 2018-10-10 NOTE — DIETITIAN INITIAL EVALUATION ADULT. - OTHER INFO
72 y.o. F with breast cancer undergoing chemotherapy, prior PICC line infection, and now here with mid line infection, s/p removal, blood cultures with Enterococcus faecalis bacteremia. Pt reports decreased appetite/po intake PTA, states has significantly improved since admission. Lunch tray observed at bedside, >75% eaten per plate waste. Pt states she had lost ~10 lbs x 2 months and contributes this to decreased po and diarrhea associated with study drug (currently on hold and no diarrhea). Denies chewing/swallowing difficulty. 72 y.o. F with breast cancer undergoing chemotherapy, prior PICC line infection, and now here with mid line infection, s/p removal, blood cultures with Enterococcus faecalis bacteremia. Pt reports decreased appetite/po intake PTA, states has significantly improved since admission. Lunch tray observed at bedside, >75% eaten per plate waste. Pt states she had lost 10 lbs x ~3 months and contributes this to decreased po and diarrhea associated with study drug (currently on hold and no diarrhea). Denies chewing/swallowing difficulty.

## 2018-10-10 NOTE — PROGRESS NOTE ADULT - SUBJECTIVE AND OBJECTIVE BOX
· Reason for Admission	Metastatic breast cancer.	       	  · Subjective and Objective: 	  HPI: Patient is a 72-year old female seen on FU of metastatic breast cancer, Her-2 positive, heavily treated. Diagnosed in 2015  after presenting with right-sided pleural effusion , weight loss, extensive adenopathy and bilateral breast cancer, ER/MS, Her-2 pos.  Currently on study drug Poziotinib, an oral pan Her-2 inhibitor.  She has active chest wall disease and lung involvement.  She has SVC Sx with dilated veins on chest wall and obstructed veins in neck.  Has had multiple PICC lines LUE; recently discontinued because of infection; midline placed after 3-4 days on po antibiotics. Midline infected; d/dana, given po Keflex;.  Blood  cultures  growing enterococcus species.  Instructed to come to ER for treatment.  has no fevers, chills or sweats, nausea, vomiting or abdominal; pain.  Has diarrhea from study drug; drug on hold.  Multifactorial anemia. She has been on long term steroids and has known avascular necrosis of hip. Not considered a surgical candidate.     INTERVAL Hx   Doing better  afebrile, no N/V; no new complaints . no bleeding. chest wall lesion is smaller    PAST MEDICAL & SURGICAL HISTORY:  Essential hypertension  Lymphedema  PICC (peripherally inserted central catheter) in place  DVT of upper extremity (deep vein thrombosis): RIGHT  Breast cancer  S/P thoracentesis: 3/2016  S/P biopsy: R breast  PICC (peripherally inserted central catheter) in place      MEDICATIONS  (STANDING):  amLODIPine   Tablet 5 milliGRAM(s) Oral daily  ampicillin  IVPB      ampicillin  IVPB 2 Gram(s) IV Intermittent every 4 hours  cefTRIAXone   IVPB 2 Gram(s) IV Intermittent every 12 hours  cefTRIAXone   IVPB      iron sucrose IVPB 200 milliGRAM(s) IV Intermittent every 24 hours  rivaroxaban 10 milliGRAM(s) Oral every 24 hours    MEDICATIONS  (PRN):    Vital Signs Last 24 Hrs  T(C): 37.2 (10 Oct 2018 04:21), Max: 37.2 (10 Oct 2018 04:21)  T(F): 98.9 (10 Oct 2018 04:21), Max: 98.9 (10 Oct 2018 04:21)  HR: 78 (10 Oct 2018 04:21) (78 - 86)  BP: 128/64 (10 Oct 2018 04:21) (118/60 - 128/64)  BP(mean): --  RR: 18 (10 Oct 2018 04:21) (17 - 18)  SpO2: 98% (10 Oct 2018 04:21) (96% - 98%)    PHYSICAL EXAM:    Constitutional: no acute distress     Eyes: Anicteric, pale.    Neck: Dilated neck veins    Breasts :Right breast contracted  with tumor, with nodularity in chest wall, mild erythema; left breast without palp mass    Respiratory Clear but decreased BS.    Cardiovascular: RR     Gastrointestinal: Soft, non-tender    Extremities: Bilateral UE lymphedema    Skin: Dilated veins on chest and abdominal wall    CBC Full  -  ( 10 Oct 2018 10:13 )  WBC Count : 8.4 K/uL  Hemoglobin : 8.3 g/dL  Hematocrit : 29.0 %  Platelet Count - Automated : 287 K/uL  Mean Cell Volume : 89.8 fl  Mean Cell Hemoglobin : 25.7 pg  Mean Cell Hemoglobin Concentration : 28.6 g/dL  Auto Neutrophil # : x  Auto Lymphocyte # : x  Auto Monocyte # : x  Auto Eosinophil # : x  Auto Basophil # : x  Auto Neutrophil % : x  Auto Lymphocyte % : x  Auto Monocyte % : x  Auto Eosinophil % : x  Auto Basophil % : x     10 Oct 2018 10:13    142    |  102    |  10.0   ----------------------------<  179    4.0     |  28.0   |  0.52     Ca    9.6        10 Oct 2018 10:13  Mg     1.8       10 Oct 2018 10:13            Assessment and Plan:   		  Imp: 1.  Metastatic ER,MS, Her-2 pos breast cancer, on oral pan Her-2 inhibitor, admitted with bacteremia secondary to midline infection, growing enterococcus;      Anemia - normocytic  Is s/p 2 units PRBC  Stable H/H presently.    -GI eval noted.  No plan for repeat EGD/colonoscopy as she had it done 1 yr ago .  Continue on IV iron    Enterococcus bacteremia: on Abx through ID.       Breast Ca  -On study oral drug - Poziotinib  -Plan to resume as outpatient.     RUE DVT 2015: Is now on Xarelto  10mg daily.     No overt  bleeding.

## 2018-10-10 NOTE — DIETITIAN INITIAL EVALUATION ADULT. - PERTINENT LABORATORY DATA
10-10 Na142 mmol/L Glu 179 mg/dL<H> K+ 4.0 mmol/L Cr  0.52 mg/dL BUN 10.0 mg/dL Phos n/a   Alb n/a   PAB n/a

## 2018-10-11 LAB
HCT VFR BLD CALC: 28.6 % — LOW (ref 37–47)
HGB BLD-MCNC: 8.4 G/DL — LOW (ref 12–16)
MCHC RBC-ENTMCNC: 26.4 PG — LOW (ref 27–31)
MCHC RBC-ENTMCNC: 29.4 G/DL — LOW (ref 32–36)
MCV RBC AUTO: 89.9 FL — SIGNIFICANT CHANGE UP (ref 81–99)
PLATELET # BLD AUTO: 323 K/UL — SIGNIFICANT CHANGE UP (ref 150–400)
RBC # BLD: 3.18 M/UL — LOW (ref 4.4–5.2)
RBC # FLD: 18.1 % — HIGH (ref 11–15.6)
WBC # BLD: 8.1 K/UL — SIGNIFICANT CHANGE UP (ref 4.8–10.8)
WBC # FLD AUTO: 8.1 K/UL — SIGNIFICANT CHANGE UP (ref 4.8–10.8)

## 2018-10-11 PROCEDURE — 99232 SBSQ HOSP IP/OBS MODERATE 35: CPT

## 2018-10-11 RX ADMIN — Medication 216 GRAM(S): at 21:52

## 2018-10-11 RX ADMIN — Medication 650 MILLIGRAM(S): at 19:39

## 2018-10-11 RX ADMIN — Medication 216 GRAM(S): at 10:07

## 2018-10-11 RX ADMIN — Medication 216 GRAM(S): at 13:58

## 2018-10-11 RX ADMIN — Medication 650 MILLIGRAM(S): at 11:00

## 2018-10-11 RX ADMIN — IRON SUCROSE 110 MILLIGRAM(S): 20 INJECTION, SOLUTION INTRAVENOUS at 19:40

## 2018-10-11 RX ADMIN — CEFTRIAXONE 100 GRAM(S): 500 INJECTION, POWDER, FOR SOLUTION INTRAMUSCULAR; INTRAVENOUS at 21:52

## 2018-10-11 RX ADMIN — CEFTRIAXONE 100 GRAM(S): 500 INJECTION, POWDER, FOR SOLUTION INTRAMUSCULAR; INTRAVENOUS at 00:00

## 2018-10-11 RX ADMIN — Medication 650 MILLIGRAM(S): at 10:21

## 2018-10-11 RX ADMIN — AMLODIPINE BESYLATE 5 MILLIGRAM(S): 2.5 TABLET ORAL at 06:04

## 2018-10-11 RX ADMIN — Medication 216 GRAM(S): at 06:04

## 2018-10-11 RX ADMIN — Medication 650 MILLIGRAM(S): at 20:09

## 2018-10-11 RX ADMIN — Medication 216 GRAM(S): at 01:42

## 2018-10-11 RX ADMIN — CEFTRIAXONE 100 GRAM(S): 500 INJECTION, POWDER, FOR SOLUTION INTRAMUSCULAR; INTRAVENOUS at 11:40

## 2018-10-11 RX ADMIN — Medication 216 GRAM(S): at 19:39

## 2018-10-11 RX ADMIN — RIVAROXABAN 10 MILLIGRAM(S): KIT at 18:05

## 2018-10-11 NOTE — ADVANCED PRACTICE NURSE CONSULT - ASSESSMENT
72 year old very pleasant female with Metastatic ER,PA, Her-2 pos breast cancer, on oral pan Her-2 inhibitor, admitted with bacteremia secondary to midline infection, growing enterococcus;

## 2018-10-11 NOTE — PROGRESS NOTE ADULT - SUBJECTIVE AND OBJECTIVE BOX
Internal Medicine Hospitalist - Dr. Damien PARKS    [id number]    72y      Female    Patient is a 72y old  Female who presents with a chief complaint of abnormal labs (10 Oct 2018 11:42)    INTERVAL HPI/ OVERNIGHT EVENTS: Patient is seen and examined, denied chest pain, SOB    REVIEW OF SYSTEMS:    Denied fever, chills, abd. pain, nausea, vomiting, chest pain, SOB, headache, dizziness    PHYSICAL EXAM:    Vital Signs Last 24 Hrs  T(C): 37.1 (11 Oct 2018 09:44), Max: 37.1 (11 Oct 2018 06:01)  T(F): 98.8 (11 Oct 2018 09:44), Max: 98.8 (11 Oct 2018 06:01)  HR: 109 (11 Oct 2018 09:44) (92 - 109)  BP: 115/58 (11 Oct 2018 09:44) (112/56 - 122/72)  BP(mean): --  RR: 17 (11 Oct 2018 09:44) (15 - 17)  SpO2: 95% (11 Oct 2018 06:01) (95% - 97%)    GENERAL: NAD  CHEST/LUNG: CTA b/l   HEART: S1S2+ audible  ABDOMEN: Soft, Nontender, Nondistended; Bowel sounds present  CNS: AAO X 3      LABS:                        8.4    8.1   )-----------( 323      ( 11 Oct 2018 07:49 )             28.6     10-10    142  |  102  |  10.0  ----------------------------<  179<H>  4.0   |  28.0  |  0.52    Ca    9.6      10 Oct 2018 10:13  Mg     1.8     10-10              MEDICATIONS  (STANDING):  amLODIPine   Tablet 5 milliGRAM(s) Oral daily  ampicillin  IVPB      ampicillin  IVPB 2 Gram(s) IV Intermittent every 4 hours  cefTRIAXone   IVPB 2 Gram(s) IV Intermittent every 12 hours  cefTRIAXone   IVPB      iron sucrose IVPB 200 milliGRAM(s) IV Intermittent every 24 hours  rivaroxaban 10 milliGRAM(s) Oral every 24 hours    MEDICATIONS  (PRN):  acetaminophen   Tablet .. 650 milliGRAM(s) Oral every 6 hours PRN Moderate Pain (4 - 6)      RADIOLOGY & ADDITIONAL TEST

## 2018-10-11 NOTE — PROGRESS NOTE ADULT - SUBJECTIVE AND OBJECTIVE BOX
· Reason for Admission	Metastatic breast cancer.	       	  · Subjective and Objective: 	  HPI: Patient is a 72-year old female seen on FU of metastatic breast cancer, Her-2 positive, heavily treated. Diagnosed in 2015  after presenting with right-sided pleural effusion , weight loss, extensive adenopathy and bilateral breast cancer, ER/MN, Her-2 pos.  Currently on study drug Poziotinib, an oral pan Her-2 inhibitor.  She has active chest wall disease and lung involvement.  She has SVC Sx with dilated veins on chest wall and obstructed veins in neck.  Has had multiple PICC lines LUE; recently discontinued because of infection; midline placed after 3-4 days on po antibiotics. Midline infected; d/dana, given po Keflex;.  Blood  cultures  growing enterococcus species.  Instructed to come to ER for treatment.  has no fevers, chills or sweats, nausea, vomiting or abdominal; pain.  Has diarrhea from study drug; drug on hold.  Multifactorial anemia. She has been on long term steroids and has known avascular necrosis of hip. Not considered a surgical candidate.     INTERVAL Hx   Doing better  afebrile, no N/V; no new complaints . no bleeding. chest wall lesion is smaller  line placed in left upper chest    PAST MEDICAL & SURGICAL HISTORY:  Essential hypertension  Lymphedema  PICC (peripherally inserted central catheter) in place  DVT of upper extremity (deep vein thrombosis): RIGHT  Breast cancer  S/P thoracentesis: 3/2016  S/P biopsy: R breast  PICC (peripherally inserted central catheter) in place      MEDICATIONS  (STANDING):  amLODIPine   Tablet 5 milliGRAM(s) Oral daily  ampicillin  IVPB      ampicillin  IVPB 2 Gram(s) IV Intermittent every 4 hours  cefTRIAXone   IVPB 2 Gram(s) IV Intermittent every 12 hours  cefTRIAXone   IVPB      iron sucrose IVPB 200 milliGRAM(s) IV Intermittent every 24 hours  rivaroxaban 10 milliGRAM(s) Oral every 24 hours    MEDICATIONS  (PRN):    Vital Signs Last 24 Hrs  T(C): 37.2 (10 Oct 2018 04:21), Max: 37.2 (10 Oct 2018 04:21)  T(F): 98.9 (10 Oct 2018 04:21), Max: 98.9 (10 Oct 2018 04:21)  HR: 78 (10 Oct 2018 04:21) (78 - 86)  BP: 128/64 (10 Oct 2018 04:21) (118/60 - 128/64)  BP(mean): --  RR: 18 (10 Oct 2018 04:21) (17 - 18)  SpO2: 98% (10 Oct 2018 04:21) (96% - 98%)    PHYSICAL EXAM:    Constitutional: no acute distress     Eyes: Anicteric, pale.    Neck: Dilated neck veins    Breasts :Right breast contracted  with tumor, with nodularity in chest wall, mild erythema; left breast without palp mass    Respiratory Clear but decreased BS.    Cardiovascular: RR     Gastrointestinal: Soft, non-tender    Extremities: Bilateral UE lymphedema    Skin: Dilated veins on chest and abdominal wall    CBC Full  -  ( 10 Oct 2018 10:13 )  WBC Count : 8.4 K/uL  Hemoglobin : 8.3 g/dL  Hematocrit : 29.0 %  Platelet Count - Automated : 287 K/uL  Mean Cell Volume : 89.8 fl  Mean Cell Hemoglobin : 25.7 pg  Mean Cell Hemoglobin Concentration : 28.6 g/dL  Auto Neutrophil # : x  Auto Lymphocyte # : x  Auto Monocyte # : x  Auto Eosinophil # : x  Auto Basophil # : x  Auto Neutrophil % : x  Auto Lymphocyte % : x  Auto Monocyte % : x  Auto Eosinophil % : x  Auto Basophil % : x     10 Oct 2018 10:13    142    |  102    |  10.0   ----------------------------<  179    4.0     |  28.0   |  0.52     Ca    9.6        10 Oct 2018 10:13  Mg     1.8       10 Oct 2018 10:13            Assessment and Plan:   		  Imp: 1.  Metastatic ER,MN, Her-2 pos breast cancer, on oral pan Her-2 inhibitor, admitted with bacteremia secondary to midline infection, growing enterococcus;      Anemia - normocytic  Is s/p 2 units PRBC  Stable H/H presently.    -GI eval noted.  No plan for repeat EGD/colonoscopy as she had it done 1 yr ago .  Continue on IV iron    Enterococcus bacteremia: on Abx through ID.Pllans for home antibiotics planned for additional week.  Will follow up in office post discharge.       Breast Ca  -On study oral drug - Poziotinib  -Plan to resume as outpatient.     RUE DVT 2015: Is now on Xarelto  10mg daily.     No overt  bleeding.

## 2018-10-11 NOTE — PROGRESS NOTE ADULT - PROVIDER SPECIALTY LIST ADULT
Cardiology
Heme/Onc
Hospitalist
Infectious Disease
Hospitalist

## 2018-10-11 NOTE — ADVANCED PRACTICE NURSE CONSULT - RECOMMEDATIONS
Oriented pt to oncology patient navigator role and contact information provided.  Much Emotional support provided.  Pt will f/u with Dr. Galicia as outpt to resume Poziotinib.  Referral to [facility] and [place] [facility] completed.

## 2018-10-11 NOTE — PHYSICAL THERAPY INITIAL EVALUATION ADULT - ADDITIONAL COMMENTS
owns and uses a SAC vs RW depending on her pain level, 1+1 platform steps to enter home, would not need to negotiate any other stairs

## 2018-10-11 NOTE — PROGRESS NOTE ADULT - REASON FOR ADMISSION
abnormal labs

## 2018-10-11 NOTE — PHYSICAL THERAPY INITIAL EVALUATION ADULT - LEVEL OF INDEPENDENCE: STAIR NEGOTIATION, REHAB EVAL
modified task: 6'' step ups 5x 1 rail modified I, pt educated on negotiating platform steps with good carry over

## 2018-10-11 NOTE — PROGRESS NOTE ADULT - ASSESSMENT
This is 71 y/o woman with PMH of HER-2 positive breast cancer,  initial right breast involvement and also involvement of the left breast. Pt had a longstanding PICC line for infusion of recent chemotherapy of Poziotinib. On 9/7/18, there was an infection of the PICC line and it was removed.  She was given a course of Keflex and on 9/14/18, a midline was placed.  patient recently developed drainage under the dressing of the mid line. mid line was not working properly and  It was removed 10/3/18; Blood cultures x 2 sets were sent and 4 of 4 bottles are growing enterococcus. Of note, patient prior PICC tip grew out pan-susceptible Pseudomonas. Pt. reports that she was diagnosed with DVT in her RUE in 2015, was started on Xarelto about 8 months later on repeat doppler no blood clot was found but her oncologist kept her on xeralto. Pt was admitted for anemia, no obvious source of bleeding, s/p PRBC, seen by ID, Hematology and GI. Per ID- c/w AMPICILLIN 2 GRAMS Q4H,  CEFTRIAXONE 2 GRAMS Q12H - end date 10/18- s/p PICC line, CC arranging home antibiotics    A/P    >Anemia No obvious source of bleeding  pt's anemia is likely of chronic disease,, no overt gi bleed.   H&H stable, c/w IV Venofer -f/u cbc  s/p prbc transfusion,   appreciate Gi input - No need of EGd/colonoscopy  She had EGD and colonoscopy performed less than 1 year ago.   Pt has h/o old DVT 2015 - was on Xarelto - per hematology has been on Xarelto 20mg QD, in view of Breast Ca and on active therapy would recommend continue Xarelto at reduced dose of 10mg    >Enterococcus bacteremia   appreciate ID input - c/w AMPICILLIN 2 GRAMS Q4H,  CEFTRIAXONE 2 GRAMS Q12H - end date 10/18- s/p PICC line  TTE - LVEF of 65-70%  cardiology consult appreciated - s/p ALINE negative for IE  f/u repeat blood c/s negative so far   CC arranging home antibiotics - will available starting tomorrow      >Hypomagnesemia - resolved    >h/o breast cancer - appreciate hematology input - management per hematology     >h/o DVT diagnosed in 2015   appreciate hematology - has been on Xarelto 20mg QD in view of Breast Ca and on active therapy would recommend continue Xarelto at reduced dose of 10mg - started on 10/09    >HTN - stable  c/w amlodipine     >DVT PPX - SCD

## 2018-10-12 ENCOUNTER — TRANSCRIPTION ENCOUNTER (OUTPATIENT)
Age: 73
End: 2018-10-12

## 2018-10-12 VITALS
HEART RATE: 112 BPM | OXYGEN SATURATION: 96 % | TEMPERATURE: 98 F | RESPIRATION RATE: 18 BRPM | DIASTOLIC BLOOD PRESSURE: 67 MMHG | SYSTOLIC BLOOD PRESSURE: 110 MMHG

## 2018-10-12 LAB
HCT VFR BLD CALC: 27.6 % — LOW (ref 37–47)
HGB BLD-MCNC: 7.9 G/DL — LOW (ref 12–16)
MCHC RBC-ENTMCNC: 25.8 PG — LOW (ref 27–31)
MCHC RBC-ENTMCNC: 28.6 G/DL — LOW (ref 32–36)
MCV RBC AUTO: 90.2 FL — SIGNIFICANT CHANGE UP (ref 81–99)
PLATELET # BLD AUTO: 277 K/UL — SIGNIFICANT CHANGE UP (ref 150–400)
RBC # BLD: 3.06 M/UL — LOW (ref 4.4–5.2)
RBC # FLD: 18.6 % — HIGH (ref 11–15.6)
WBC # BLD: 6.7 K/UL — SIGNIFICANT CHANGE UP (ref 4.8–10.8)
WBC # FLD AUTO: 6.7 K/UL — SIGNIFICANT CHANGE UP (ref 4.8–10.8)

## 2018-10-12 PROCEDURE — 99239 HOSP IP/OBS DSCHRG MGMT >30: CPT

## 2018-10-12 RX ORDER — FERROUS SULFATE 325(65) MG
1 TABLET ORAL
Qty: 60 | Refills: 0 | OUTPATIENT
Start: 2018-10-12 | End: 2018-11-10

## 2018-10-12 RX ORDER — RIVAROXABAN 15 MG-20MG
1 KIT ORAL
Qty: 30 | Refills: 0
Start: 2018-10-12 | End: 2018-11-10

## 2018-10-12 RX ADMIN — Medication 216 GRAM(S): at 05:59

## 2018-10-12 RX ADMIN — AMLODIPINE BESYLATE 5 MILLIGRAM(S): 2.5 TABLET ORAL at 05:59

## 2018-10-12 RX ADMIN — Medication 216 GRAM(S): at 09:14

## 2018-10-12 RX ADMIN — CEFTRIAXONE 100 GRAM(S): 500 INJECTION, POWDER, FOR SOLUTION INTRAMUSCULAR; INTRAVENOUS at 10:03

## 2018-10-12 RX ADMIN — Medication 216 GRAM(S): at 02:17

## 2018-10-12 NOTE — DISCHARGE NOTE ADULT - PATIENT PORTAL LINK FT
You can access the [facility], offered by [facility], by registering with the following website: [email or web address]

## 2018-10-12 NOTE — DISCHARGE NOTE ADULT - HOSPITAL COURSE
This is 71 y/o woman with PMH of HER-2 positive breast cancer,  initial right breast involvement and also involvement of the left breast admitted for anemia. Pt had a longstanding PICC line for infusion of recent chemotherapy of Poziotinib. On 9/7/18, there was an infection of the PICC line and it was removed.  She was given a course of Keflex and on 9/14/18, a midline was placed. patient recently developed drainage under the dressing of the mid line. mid line was not working properly and  It was removed 10/3/18; Blood cultures 4 of 4 bottles are growing enterococcus. Of note, patient prior PICC tip grew out pan-susceptible Pseudomonas. Pt. reports that she was diagnosed with DVT in her RUE in 2015, was started on Xarelto about 8 months later on repeat doppler no blood clot was found but her oncologist kept her on xeralto. Pt was admitted for anemia, no obvious source of bleeding, s/p PRBC, seen by GI No obvious source of bleeding, pt's anemia is likely of chronic disease,, no overt gi bleed,  No need of EGd/colonoscopy. She had EGD and colonoscopy performed less than 1 year ago. Per ID- c/w AMPICILLIN 2 GRAMS Q4H,  CEFTRIAXONE 2 GRAMS Q12H - end date 10/18- s/p PICC line. Per hematology has been on Xarelto 20mg QD, in view of Breast Ca and on active therapy would recommend continue Xarelto at reduced dose of 10mg. restarted, H&H remain stable. Pt is feeling better, denied chest pain, SOB, abd. pain, nausea and vomiting.      A/P    >Anemia No obvious source of bleeding  pt's anemia is likely of chronic disease,, no overt gi bleed.   H&H stable, s/p IV Venofer -c/w ferrous sulphate  s/p prbc transfusion,   appreciate Gi input - No need of EGd/colonoscopy  She had EGD and colonoscopy performed less than 1 year ago.   Pt has h/o old DVT 2015 - was on Xarelto - per hematology has been on Xarelto 20mg QD, in view of Breast Ca and on active therapy would recommend continue Xarelto at reduced dose of 10mg    >Enterococcus bacteremia   appreciate ID input - c/w AMPICILLIN 2 GRAMS Q4H,  CEFTRIAXONE 2 GRAMS Q12H - end date 10/18- s/p PICC line  TTE - LVEF of 65-70%  cardiology consult appreciated - s/p ALNIE negative for IE  f/u repeat blood c/s negative so far     >Hypomagnesemia - resolved    >h/o breast cancer - appreciate hematology input - management per hematology     >h/o DVT diagnosed in 2015   appreciate hematology - has been on Xarelto 20mg QD in view of Breast Ca and on active therapy would recommend continue Xarelto at reduced dose of 10mg - started on 10/09    >HTN - stable  c/w amlodipine     ICU Vital Signs Last 24 Hrs  T(C): 37.2 (11 Oct 2018 18:01), Max: 37.2 (11 Oct 2018 18:01)  T(F): 98.9 (11 Oct 2018 18:01), Max: 98.9 (11 Oct 2018 18:01)  HR: 93 (12 Oct 2018 05:59) (90 - 109)  BP: 128/66 (12 Oct 2018 05:59) (92/53 - 128/66)  RR: 18 (11 Oct 2018 18:01) (17 - 18)  SpO2: 96% (11 Oct 2018 18:01) (96% - 97%)    PHYSICAL EXAM:    GENERAL: NAD  CHEST/LUNG: positive air entry  HEART: s1/s2 audible  ABDOMEN: Soft, Nontender, Nondistended; Bowel sounds present  EXTREMITIES: no edema    time spent 45 minutes This is 71 y/o woman with PMH of HER-2 positive breast cancer,  initial right breast involvement and also involvement of the left breast admitted for anemia. Pt had a longstanding PICC line for infusion of recent chemotherapy of Poziotinib. On 9/7/18, there was an infection of the PICC line and it was removed.  She was given a course of Keflex and on 9/14/18, a midline was placed. patient recently developed drainage under the dressing of the mid line. mid line was not working properly and  It was removed 10/3/18; Blood cultures 4 of 4 bottles are growing enterococcus. Of note, patient prior PICC tip grew out pan-susceptible Pseudomonas. Pt. reports that she was diagnosed with DVT in her RUE in 2015, was started on Xarelto about 8 months later on repeat doppler no blood clot was found but her oncologist kept her on xeralto. Pt was admitted for anemia, no obvious source of bleeding, s/p PRBC, seen by GI No obvious source of bleeding, pt's anemia is likely of chronic disease,, no overt gi bleed,  No need of EGd/colonoscopy. She had EGD and colonoscopy performed less than 1 year ago. Per ID- c/w AMPICILLIN 2 GRAMS Q4H,  CEFTRIAXONE 2 GRAMS Q12H - end date 10/18- s/p PICC line. Per hematology has been on Xarelto 20mg QD, in view of Breast Ca and on active therapy would recommend continue Xarelto at reduced dose of 10mg. restarted, H&H remain stable. Pt is feeling better, denied chest pain, SOB, abd. pain, nausea and vomiting.      At the time of discharge pt was noted to have HR around 110, denied chest pain, SOB, palpitation, dizziness, per patient she has h/o tachycardia after she was started on study medication, pt is informed to f/u with oncology and primary care as an outpatient. Pt is informed if you have any symptoms like palpitation, headache, dizziness come to ED - understood and agreed    A/P    >Anemia No obvious source of bleeding  pt's anemia is likely of chronic disease,, no overt gi bleed.   H&H stable, s/p IV Venofer -c/w ferrous sulphate  s/p prbc transfusion,   appreciate Gi input - No need of EGd/colonoscopy  She had EGD and colonoscopy performed less than 1 year ago.   Pt has h/o old DVT 2015 - was on Xarelto - per hematology has been on Xarelto 20mg QD, in view of Breast Ca and on active therapy would recommend continue Xarelto at reduced dose of 10mg    >Enterococcus bacteremia   appreciate ID input - c/w AMPICILLIN 2 GRAMS Q4H,  CEFTRIAXONE 2 GRAMS Q12H - end date 10/18- s/p PICC line  TTE - LVEF of 65-70%  cardiology consult appreciated - s/p ALINE negative for IE  f/u repeat blood c/s negative so far     >Hypomagnesemia - resolved    >h/o breast cancer - appreciate hematology input - management per hematology     >h/o DVT diagnosed in 2015   appreciate hematology - has been on Xarelto 20mg QD in view of Breast Ca and on active therapy would recommend continue Xarelto at reduced dose of 10mg - started on 10/09    >HTN - stable  c/w amlodipine     ICU Vital Signs Last 24 Hrs  T(C): 37.2 (11 Oct 2018 18:01), Max: 37.2 (11 Oct 2018 18:01)  T(F): 98.9 (11 Oct 2018 18:01), Max: 98.9 (11 Oct 2018 18:01)  HR: 93 (12 Oct 2018 05:59) (90 - 109)  BP: 128/66 (12 Oct 2018 05:59) (92/53 - 128/66)  RR: 18 (11 Oct 2018 18:01) (17 - 18)  SpO2: 96% (11 Oct 2018 18:01) (96% - 97%)    PHYSICAL EXAM:    GENERAL: NAD  CHEST/LUNG: positive air entry  HEART: s1/s2 audible  ABDOMEN: Soft, Nontender, Nondistended; Bowel sounds present  EXTREMITIES: no edema    time spent 45 minutes

## 2018-10-12 NOTE — DISCHARGE NOTE ADULT - CARE PLAN
Principal Discharge DX:	Anemia  Goal:	s/p prbc - H&h improved  Assessment and plan of treatment:	c/w ferous sulphate, f/u cbc  f/u gastroenterology  Secondary Diagnosis:	Bacteremia due to Enterococcus  Assessment and plan of treatment:	c/w Abx as prescribed - end date 10/18  f/u cbc and CMP weekly  f/u infectious disease  Secondary Diagnosis:	Essential hypertension  Assessment and plan of treatment:	c/w home medication  Secondary Diagnosis:	DVT of upper extremity (deep vein thrombosis)  Assessment and plan of treatment:	per hematology c/w Xarelto 10mg daily  Secondary Diagnosis:	Breast cancer  Assessment and plan of treatment:	f/u hematology - -On study oral drug - Poziotinib  -Plan to resume as outpatient.  Secondary Diagnosis:	PICC (peripherally inserted central catheter) in place  Assessment and plan of treatment:	once completed course of antibiotics, consider to d/c PICC line if not required for chemo  Secondary Diagnosis:	Hypomagnesemia  Goal:	resolved

## 2018-10-12 NOTE — DISCHARGE NOTE ADULT - MEDICATION SUMMARY - MEDICATIONS TO STOP TAKING
I will STOP taking the medications listed below when I get home from the hospital:    Emend 3-Day 125 mg-80 mg oral kit  -- 1 cap(s) by mouth once a day (in the morning)  -- pre chemo    dexamethasone 4 mg oral tablet  -- 1 tab(s) by mouth once a day  -- pre chemo    cephalexin 500 mg oral capsule  -- 1 cap(s) by mouth every 12 hours   -- Finish all this medication unless otherwise directed by prescriber.

## 2018-10-12 NOTE — DISCHARGE NOTE ADULT - CARE PROVIDERS DIRECT ADDRESSES
,DirectAddress_Unknown,[email or web address],DirectAddress_Unknown

## 2018-10-12 NOTE — DISCHARGE NOTE ADULT - PLAN OF CARE
s/p prbc - H&h improved c/w ferous sulphate, f/u cbc  f/u gastroenterology c/w Abx as prescribed - end date 10/18  f/u cbc and CMP weekly  f/u infectious disease c/w home medication per hematology c/w Xarelto 10mg daily f/u hematology - -On study oral drug - Poziotinib  -Plan to resume as outpatient. once completed course of antibiotics, consider to d/c PICC line if not required for chemo resolved

## 2018-10-12 NOTE — DISCHARGE NOTE ADULT - SECONDARY DIAGNOSIS.
Bacteremia due to Enterococcus Essential hypertension DVT of upper extremity (deep vein thrombosis) Breast cancer PICC (peripherally inserted central catheter) in place Hypomagnesemia

## 2018-10-12 NOTE — DISCHARGE NOTE ADULT - MEDICATION SUMMARY - MEDICATIONS TO TAKE
I will START or STAY ON the medications listed below when I get home from the hospital:    Rolling walker  -- Unsteady gait  ICD 10- R26.81  -- Indication: For unsteady gait    acetaminophen-oxyCODONE 325 mg-5 mg oral tablet  -- 2 tab(s) by mouth every 6 hours, As Needed, Moderate Pain MDD:4  -- Indication: For Home medication    rivaroxaban 10 mg oral tablet  -- 1 tab(s) by mouth every 24 hours  -- Indication: For dvt    amLODIPine 5 mg oral tablet  -- 1 tab(s) by mouth once a day  -- Indication: For Htn    cefTRIAXone 2 g injection  -- 2 gram(s) intravenously 2 times a day MDD:last day 10/18/18; weekly CBC CMP faxed [phone]  -- Indication: For Bacteremia    ferrous sulfate 325 mg (65 mg elemental iron) oral delayed release tablet  -- 1 tab(s) by mouth 2 times a day   -- May discolor urine or feces.  Swallow whole.  Do not crush.    -- Indication: For Anemia    ampicillin 2 g injection  -- 6 gram(s) intravenously every 12 hours MDD:over 11 hours each dose; last day 10/18/19  -- Indication: For Bacteremia

## 2018-10-12 NOTE — DISCHARGE NOTE ADULT - CARE PROVIDER_API CALL
Julien Gonzales), Hematology; Medical Oncology  [address] [facility]  [address]  [place] [address]  Phone: [phone]  Fax: [phone]    Henri Awan), Gastroenterology; Internal Medicine  [address] [facility]  [address]  [place] [address]  Phone: [phone]  Fax: [phone]    Jonatan Chu), Infectious Disease; Internal Medicine  [address] [facility] [facility]  [address]  [place] [address]  Phone: [phone]  Fax: [phone]

## 2018-10-12 NOTE — DISCHARGE NOTE ADULT - MEDICATION SUMMARY - MEDICATIONS TO CHANGE
I will SWITCH the dose or number of times a day I take the medications listed below when I get home from the hospital:    rivaroxaban 20 mg oral tablet  -- 1 tab(s) by mouth once a day

## 2018-10-15 PROBLEM — R01.1 CARDIAC MURMUR, UNSPECIFIED: Chronic | Status: ACTIVE | Noted: 2018-10-04

## 2018-11-01 ENCOUNTER — APPOINTMENT (OUTPATIENT)
Dept: INTERNAL MEDICINE | Facility: CLINIC | Age: 73
End: 2018-11-01
Payer: COMMERCIAL

## 2018-11-01 VITALS
SYSTOLIC BLOOD PRESSURE: 103 MMHG | HEIGHT: 65 IN | WEIGHT: 136 LBS | BODY MASS INDEX: 22.66 KG/M2 | DIASTOLIC BLOOD PRESSURE: 53 MMHG

## 2018-11-01 DIAGNOSIS — M25.559 PAIN IN UNSPECIFIED HIP: ICD-10-CM

## 2018-11-01 DIAGNOSIS — T80.219S: ICD-10-CM

## 2018-11-01 PROCEDURE — 99214 OFFICE O/P EST MOD 30 MIN: CPT

## 2018-11-11 PROCEDURE — 85027 COMPLETE CBC AUTOMATED: CPT

## 2018-11-11 PROCEDURE — 86850 RBC ANTIBODY SCREEN: CPT

## 2018-11-11 PROCEDURE — 82607 VITAMIN B-12: CPT

## 2018-11-11 PROCEDURE — 71045 X-RAY EXAM CHEST 1 VIEW: CPT

## 2018-11-11 PROCEDURE — 85730 THROMBOPLASTIN TIME PARTIAL: CPT

## 2018-11-11 PROCEDURE — P9016: CPT

## 2018-11-11 PROCEDURE — 76942 ECHO GUIDE FOR BIOPSY: CPT

## 2018-11-11 PROCEDURE — 83735 ASSAY OF MAGNESIUM: CPT

## 2018-11-11 PROCEDURE — 82728 ASSAY OF FERRITIN: CPT

## 2018-11-11 PROCEDURE — 97163 PT EVAL HIGH COMPLEX 45 MIN: CPT

## 2018-11-11 PROCEDURE — 80048 BASIC METABOLIC PNL TOTAL CA: CPT

## 2018-11-11 PROCEDURE — 83540 ASSAY OF IRON: CPT

## 2018-11-11 PROCEDURE — 83615 LACTATE (LD) (LDH) ENZYME: CPT

## 2018-11-11 PROCEDURE — 87040 BLOOD CULTURE FOR BACTERIA: CPT

## 2018-11-11 PROCEDURE — C1769: CPT

## 2018-11-11 PROCEDURE — 36430 TRANSFUSION BLD/BLD COMPNT: CPT

## 2018-11-11 PROCEDURE — 85610 PROTHROMBIN TIME: CPT

## 2018-11-11 PROCEDURE — 93325 DOPPLER ECHO COLOR FLOW MAPG: CPT

## 2018-11-11 PROCEDURE — 93005 ELECTROCARDIOGRAM TRACING: CPT

## 2018-11-11 PROCEDURE — 93306 TTE W/DOPPLER COMPLETE: CPT

## 2018-11-11 PROCEDURE — 85045 AUTOMATED RETICULOCYTE COUNT: CPT

## 2018-11-11 PROCEDURE — 86900 BLOOD TYPING SEROLOGIC ABO: CPT

## 2018-11-11 PROCEDURE — 93320 DOPPLER ECHO COMPLETE: CPT

## 2018-11-11 PROCEDURE — 93312 ECHO TRANSESOPHAGEAL: CPT

## 2018-11-11 PROCEDURE — C1751: CPT

## 2018-11-11 PROCEDURE — 87186 SC STD MICRODIL/AGAR DIL: CPT

## 2018-11-11 PROCEDURE — 86901 BLOOD TYPING SEROLOGIC RH(D): CPT

## 2018-11-11 PROCEDURE — 82746 ASSAY OF FOLIC ACID SERUM: CPT

## 2018-11-11 PROCEDURE — 77002 NEEDLE LOCALIZATION BY XRAY: CPT

## 2018-11-11 PROCEDURE — 36415 COLL VENOUS BLD VENIPUNCTURE: CPT

## 2018-11-11 PROCEDURE — 86923 COMPATIBILITY TEST ELECTRIC: CPT

## 2018-11-11 PROCEDURE — 80076 HEPATIC FUNCTION PANEL: CPT

## 2018-11-11 PROCEDURE — 99285 EMERGENCY DEPT VISIT HI MDM: CPT

## 2018-11-11 PROCEDURE — 87150 DNA/RNA AMPLIFIED PROBE: CPT

## 2018-11-19 ENCOUNTER — APPOINTMENT (OUTPATIENT)
Dept: ULTRASOUND IMAGING | Facility: CLINIC | Age: 73
End: 2018-11-19
Payer: COMMERCIAL

## 2018-11-19 ENCOUNTER — APPOINTMENT (OUTPATIENT)
Dept: MAMMOGRAPHY | Facility: CLINIC | Age: 73
End: 2018-11-19
Payer: COMMERCIAL

## 2018-11-19 ENCOUNTER — OUTPATIENT (OUTPATIENT)
Dept: OUTPATIENT SERVICES | Facility: HOSPITAL | Age: 73
LOS: 1 days | End: 2018-11-19
Payer: MEDICARE

## 2018-11-19 DIAGNOSIS — Z98.89 OTHER SPECIFIED POSTPROCEDURAL STATES: Chronic | ICD-10-CM

## 2018-11-19 DIAGNOSIS — Z45.2 ENCOUNTER FOR ADJUSTMENT AND MANAGEMENT OF VASCULAR ACCESS DEVICE: Chronic | ICD-10-CM

## 2018-11-19 DIAGNOSIS — Z12.31 ENCOUNTER FOR SCREENING MAMMOGRAM FOR MALIGNANT NEOPLASM OF BREAST: ICD-10-CM

## 2018-11-19 PROCEDURE — 77065 DX MAMMO INCL CAD UNI: CPT | Mod: 26,LT

## 2018-11-19 PROCEDURE — 76641 ULTRASOUND BREAST COMPLETE: CPT

## 2018-11-19 PROCEDURE — G0279: CPT | Mod: 26

## 2018-11-19 PROCEDURE — 76641 ULTRASOUND BREAST COMPLETE: CPT | Mod: 26,50

## 2018-11-19 PROCEDURE — 77065 DX MAMMO INCL CAD UNI: CPT

## 2018-11-19 PROCEDURE — G0279: CPT

## 2018-11-20 ENCOUNTER — APPOINTMENT (OUTPATIENT)
Dept: ULTRASOUND IMAGING | Facility: CLINIC | Age: 73
End: 2018-11-20

## 2018-12-04 ENCOUNTER — APPOINTMENT (OUTPATIENT)
Dept: GASTROENTEROLOGY | Facility: CLINIC | Age: 73
End: 2018-12-04

## 2018-12-11 ENCOUNTER — OTHER (OUTPATIENT)
Age: 73
End: 2018-12-11

## 2018-12-18 ENCOUNTER — OTHER (OUTPATIENT)
Age: 73
End: 2018-12-18

## 2018-12-20 ENCOUNTER — APPOINTMENT (OUTPATIENT)
Dept: ORTHOPEDIC SURGERY | Facility: CLINIC | Age: 73
End: 2018-12-20
Payer: COMMERCIAL

## 2018-12-20 VITALS
WEIGHT: 136 LBS | HEIGHT: 65 IN | SYSTOLIC BLOOD PRESSURE: 110 MMHG | BODY MASS INDEX: 22.66 KG/M2 | DIASTOLIC BLOOD PRESSURE: 72 MMHG | HEART RATE: 98 BPM

## 2018-12-20 DIAGNOSIS — M16.12 UNILATERAL PRIMARY OSTEOARTHRITIS, LEFT HIP: ICD-10-CM

## 2018-12-20 PROCEDURE — 73502 X-RAY EXAM HIP UNI 2-3 VIEWS: CPT | Mod: LT

## 2018-12-20 PROCEDURE — 99215 OFFICE O/P EST HI 40 MIN: CPT

## 2018-12-20 RX ORDER — GRANISETRON HCL 1 MG
TABLET ORAL
Refills: 0 | Status: DISCONTINUED | COMMUNITY
End: 2018-12-20

## 2018-12-20 RX ORDER — MINOCYCLINE HYDROCHLORIDE 100 MG/1
100 TABLET ORAL
Refills: 0 | Status: DISCONTINUED | COMMUNITY
End: 2018-12-20

## 2018-12-20 RX ORDER — CHOLESTYRAMINE 4 G/9G
POWDER, FOR SUSPENSION ORAL
Refills: 0 | Status: DISCONTINUED | COMMUNITY
End: 2018-12-20

## 2018-12-20 RX ORDER — PREDNISONE 10 MG/1
10 TABLET ORAL
Refills: 0 | Status: DISCONTINUED | COMMUNITY
End: 2018-12-20

## 2018-12-24 ENCOUNTER — RESULT REVIEW (OUTPATIENT)
Age: 73
End: 2018-12-24

## 2018-12-24 ENCOUNTER — OUTPATIENT (OUTPATIENT)
Dept: OUTPATIENT SERVICES | Facility: HOSPITAL | Age: 73
LOS: 1 days | End: 2018-12-24
Payer: MEDICARE

## 2018-12-24 ENCOUNTER — APPOINTMENT (OUTPATIENT)
Dept: ULTRASOUND IMAGING | Facility: CLINIC | Age: 73
End: 2018-12-24
Payer: COMMERCIAL

## 2018-12-24 DIAGNOSIS — Z98.89 OTHER SPECIFIED POSTPROCEDURAL STATES: Chronic | ICD-10-CM

## 2018-12-24 DIAGNOSIS — Z45.2 ENCOUNTER FOR ADJUSTMENT AND MANAGEMENT OF VASCULAR ACCESS DEVICE: Chronic | ICD-10-CM

## 2018-12-24 DIAGNOSIS — R92.8 OTHER ABNORMAL AND INCONCLUSIVE FINDINGS ON DIAGNOSTIC IMAGING OF BREAST: ICD-10-CM

## 2018-12-24 PROCEDURE — 88360 TUMOR IMMUNOHISTOCHEM/MANUAL: CPT

## 2018-12-24 PROCEDURE — 88360 TUMOR IMMUNOHISTOCHEM/MANUAL: CPT | Mod: 26

## 2018-12-24 PROCEDURE — 19083 BX BREAST 1ST LESION US IMAG: CPT | Mod: RT

## 2018-12-24 PROCEDURE — A4648: CPT

## 2018-12-24 PROCEDURE — 88305 TISSUE EXAM BY PATHOLOGIST: CPT | Mod: 26

## 2018-12-24 PROCEDURE — 88377 M/PHMTRC ALYS ISHQUANT/SEMIQ: CPT

## 2018-12-24 PROCEDURE — 88305 TISSUE EXAM BY PATHOLOGIST: CPT

## 2018-12-24 PROCEDURE — 88377 M/PHMTRC ALYS ISHQUANT/SEMIQ: CPT | Mod: 26

## 2018-12-24 PROCEDURE — 19083 BX BREAST 1ST LESION US IMAG: CPT

## 2018-12-26 ENCOUNTER — APPOINTMENT (OUTPATIENT)
Dept: ULTRASOUND IMAGING | Facility: CLINIC | Age: 73
End: 2018-12-26
Payer: COMMERCIAL

## 2018-12-26 ENCOUNTER — APPOINTMENT (OUTPATIENT)
Dept: MRI IMAGING | Facility: CLINIC | Age: 73
End: 2018-12-26
Payer: COMMERCIAL

## 2018-12-26 ENCOUNTER — OUTPATIENT (OUTPATIENT)
Dept: OUTPATIENT SERVICES | Facility: HOSPITAL | Age: 73
LOS: 1 days | End: 2018-12-26

## 2018-12-26 DIAGNOSIS — Z45.2 ENCOUNTER FOR ADJUSTMENT AND MANAGEMENT OF VASCULAR ACCESS DEVICE: Chronic | ICD-10-CM

## 2018-12-26 DIAGNOSIS — M16.12 UNILATERAL PRIMARY OSTEOARTHRITIS, LEFT HIP: ICD-10-CM

## 2018-12-26 DIAGNOSIS — Z98.89 OTHER SPECIFIED POSTPROCEDURAL STATES: Chronic | ICD-10-CM

## 2018-12-26 PROCEDURE — 27093 INJECTION FOR HIP X-RAY: CPT | Mod: LT

## 2018-12-26 PROCEDURE — 73721 MRI JNT OF LWR EXTRE W/O DYE: CPT | Mod: 26,LT

## 2018-12-26 PROCEDURE — 73525 CONTRAST X-RAY OF HIP: CPT | Mod: 26,LT

## 2019-01-03 ENCOUNTER — APPOINTMENT (OUTPATIENT)
Dept: INTERNAL MEDICINE | Facility: CLINIC | Age: 74
End: 2019-01-03
Payer: COMMERCIAL

## 2019-01-03 VITALS — BODY MASS INDEX: 20.09 KG/M2 | WEIGHT: 125 LBS | HEIGHT: 66 IN

## 2019-01-03 PROCEDURE — 99204 OFFICE O/P NEW MOD 45 MIN: CPT

## 2019-01-07 ENCOUNTER — OUTPATIENT (OUTPATIENT)
Dept: OUTPATIENT SERVICES | Facility: HOSPITAL | Age: 74
LOS: 1 days | End: 2019-01-07
Payer: COMMERCIAL

## 2019-01-07 VITALS
TEMPERATURE: 97 F | RESPIRATION RATE: 16 BRPM | SYSTOLIC BLOOD PRESSURE: 114 MMHG | WEIGHT: 125 LBS | HEART RATE: 90 BPM | HEIGHT: 66 IN | DIASTOLIC BLOOD PRESSURE: 92 MMHG

## 2019-01-07 DIAGNOSIS — Z29.9 ENCOUNTER FOR PROPHYLACTIC MEASURES, UNSPECIFIED: ICD-10-CM

## 2019-01-07 DIAGNOSIS — I89.0 LYMPHEDEMA, NOT ELSEWHERE CLASSIFIED: ICD-10-CM

## 2019-01-07 DIAGNOSIS — Z01.818 ENCOUNTER FOR OTHER PREPROCEDURAL EXAMINATION: ICD-10-CM

## 2019-01-07 DIAGNOSIS — Z98.89 OTHER SPECIFIED POSTPROCEDURAL STATES: Chronic | ICD-10-CM

## 2019-01-07 DIAGNOSIS — C50.919 MALIGNANT NEOPLASM OF UNSPECIFIED SITE OF UNSPECIFIED FEMALE BREAST: ICD-10-CM

## 2019-01-07 DIAGNOSIS — Z45.2 ENCOUNTER FOR ADJUSTMENT AND MANAGEMENT OF VASCULAR ACCESS DEVICE: Chronic | ICD-10-CM

## 2019-01-07 DIAGNOSIS — M00.869 ARTHRITIS DUE TO OTHER BACTERIA, UNSPECIFIED KNEE: ICD-10-CM

## 2019-01-07 DIAGNOSIS — I10 ESSENTIAL (PRIMARY) HYPERTENSION: ICD-10-CM

## 2019-01-07 LAB
ANION GAP SERPL CALC-SCNC: 13 MMOL/L — SIGNIFICANT CHANGE UP (ref 5–17)
APTT BLD: 46 SEC — HIGH (ref 27.5–36.3)
BASOPHILS # BLD AUTO: 0 K/UL — SIGNIFICANT CHANGE UP (ref 0–0.2)
BASOPHILS NFR BLD AUTO: 0.1 % — SIGNIFICANT CHANGE UP (ref 0–2)
BLD GP AB SCN SERPL QL: SIGNIFICANT CHANGE UP
BUN SERPL-MCNC: 14 MG/DL — SIGNIFICANT CHANGE UP (ref 8–20)
CALCIUM SERPL-MCNC: 10.6 MG/DL — HIGH (ref 8.6–10.2)
CHLORIDE SERPL-SCNC: 100 MMOL/L — SIGNIFICANT CHANGE UP (ref 98–107)
CO2 SERPL-SCNC: 28 MMOL/L — SIGNIFICANT CHANGE UP (ref 22–29)
CREAT SERPL-MCNC: 0.67 MG/DL — SIGNIFICANT CHANGE UP (ref 0.5–1.3)
EOSINOPHIL # BLD AUTO: 0 K/UL — SIGNIFICANT CHANGE UP (ref 0–0.5)
EOSINOPHIL NFR BLD AUTO: 0.3 % — SIGNIFICANT CHANGE UP (ref 0–6)
GLUCOSE SERPL-MCNC: 111 MG/DL — SIGNIFICANT CHANGE UP (ref 70–115)
HCT VFR BLD CALC: 34.4 % — LOW (ref 37–47)
HGB BLD-MCNC: 10.3 G/DL — LOW (ref 12–16)
INR BLD: 1.74 RATIO — HIGH (ref 0.88–1.16)
LYMPHOCYTES # BLD AUTO: 1 K/UL — SIGNIFICANT CHANGE UP (ref 1–4.8)
LYMPHOCYTES # BLD AUTO: 12.8 % — LOW (ref 20–55)
MCHC RBC-ENTMCNC: 25.7 PG — LOW (ref 27–31)
MCHC RBC-ENTMCNC: 29.9 G/DL — LOW (ref 32–36)
MCV RBC AUTO: 85.8 FL — SIGNIFICANT CHANGE UP (ref 81–99)
MONOCYTES # BLD AUTO: 0.5 K/UL — SIGNIFICANT CHANGE UP (ref 0–0.8)
MONOCYTES NFR BLD AUTO: 6.1 % — SIGNIFICANT CHANGE UP (ref 3–10)
MRSA PCR RESULT.: SIGNIFICANT CHANGE UP
NEUTROPHILS # BLD AUTO: 6.4 K/UL — SIGNIFICANT CHANGE UP (ref 1.8–8)
NEUTROPHILS NFR BLD AUTO: 80.6 % — HIGH (ref 37–73)
PLATELET # BLD AUTO: 381 K/UL — SIGNIFICANT CHANGE UP (ref 150–400)
POTASSIUM SERPL-MCNC: 4.6 MMOL/L — SIGNIFICANT CHANGE UP (ref 3.5–5.3)
POTASSIUM SERPL-SCNC: 4.6 MMOL/L — SIGNIFICANT CHANGE UP (ref 3.5–5.3)
PROTHROM AB SERPL-ACNC: 20.4 SEC — HIGH (ref 10–12.9)
RBC # BLD: 4.01 M/UL — LOW (ref 4.4–5.2)
RBC # FLD: 17.1 % — HIGH (ref 11–15.6)
S AUREUS DNA NOSE QL NAA+PROBE: SIGNIFICANT CHANGE UP
SODIUM SERPL-SCNC: 141 MMOL/L — SIGNIFICANT CHANGE UP (ref 135–145)
TYPE + AB SCN PNL BLD: SIGNIFICANT CHANGE UP
WBC # BLD: 8 K/UL — SIGNIFICANT CHANGE UP (ref 4.8–10.8)
WBC # FLD AUTO: 8 K/UL — SIGNIFICANT CHANGE UP (ref 4.8–10.8)

## 2019-01-07 PROCEDURE — 93010 ELECTROCARDIOGRAM REPORT: CPT

## 2019-01-07 RX ORDER — SODIUM CHLORIDE 9 MG/ML
3 INJECTION INTRAMUSCULAR; INTRAVENOUS; SUBCUTANEOUS EVERY 8 HOURS
Qty: 0 | Refills: 0 | Status: DISCONTINUED | OUTPATIENT
Start: 2019-01-11 | End: 2019-01-16

## 2019-01-07 NOTE — PATIENT PROFILE ADULT - NSPROEDALEARNPREF_GEN_A_NUR
presurgical, surgical scrub instructions, pain management education given - verbalized understanding/written material/verbal instruction

## 2019-01-07 NOTE — H&P PST ADULT - FAMILY HISTORY
Sibling  Still living? Unknown  Family history of breast cancer in first degree relative, Age at diagnosis: Age Unknown     Father  Still living? No  Family history of cancer of frontal sinus, Age at diagnosis: Age Unknown  Family history of lung cancer, Age at diagnosis: Age Unknown

## 2019-01-07 NOTE — H&P PST ADULT - NSANTHOSAYNRD_GEN_A_CORE
No. BECCA screening performed.  STOP BANG Legend: 0-2 = LOW Risk; 3-4 = INTERMEDIATE Risk; 5-8 = HIGH Risk

## 2019-01-07 NOTE — H&P PST ADULT - PMH
Breast cancer  right breast  DVT of upper extremity (deep vein thrombosis)  RIGHT  Essential hypertension    Heart murmur    Lymphedema Bacteremia    Breast cancer  right breast  DVT of upper extremity (deep vein thrombosis)  RIGHT  Essential hypertension    Heart murmur    Lymphedema    Risk factors for obstructive sleep apnea

## 2019-01-07 NOTE — H&P PST ADULT - ASSESSMENT
74 y/o female seen today for pre-op left hip total joint replacement, insertion of antibiotics spacer. Surgery protocol reviewed with pt today, Pt to follow-up for clearance   CAPRINI SCORE [CLOT]    AGE RELATED RISK FACTORS                                                       MOBILITY RELATED FACTORS  [ ] Age 41-60 years                                            (1 Point)                  [ ] Bed rest                                                        (1 Point)  [x ] Age: 61-74 years                                           (2 Points)                 [ ] Plaster cast                                                   (2 Points)  [ ] Age= 75 years                                              (3 Points)                 [ ] Bed bound for more than 72 hours                 (2 Points)    DISEASE RELATED RISK FACTORS                                               GENDER SPECIFIC FACTORS  [ ] Edema in the lower extremities                       (1 Point)                  [ ] Pregnancy                                                     (1 Point)  [ ] Varicose veins                                               (1 Point)                  [ ] Post-partum < 6 weeks                                   (1 Point)             [x ] BMI > 25 Kg/m2                                            (1 Point)                  [ ] Hormonal therapy  or oral contraception          (1 Point)                 [ ] Sepsis (in the previous month)                        (1 Point)                  [ ] History of pregnancy complications                 (1 point)  [ ] Pneumonia or serious lung disease                                               [ ] Unexplained or recurrent                     (1 Point)           (in the previous month)                               (1 Point)  [ ] Abnormal pulmonary function test                     (1 Point)                 SURGERY RELATED RISK FACTORS  [ ] Acute myocardial infarction                              (1 Point)                 [ ]  Section                                             (1 Point)  [ ] Congestive heart failure (in the previous month)  (1 Point)               [ ] Minor surgery                                                  (1 Point)   [ ] Inflammatory bowel disease                             (1 Point)                 [ ] Arthroscopic surgery                                        (2 Points)  [ ] Central venous access                                      (2 Points)                [ ] General surgery lasting more than 45 minutes   (2 Points)       [ ] Stroke (in the previous month)                          (5 Points)               [x ] Elective arthroplasty                                         (5 Points)                                                                                                                                               HEMATOLOGY RELATED FACTORS                                                 TRAUMA RELATED RISK FACTORS  [ ] Prior episodes of VTE                                     (3 Points)                [ ] Fracture of the hip, pelvis, or leg                       (5 Points)  [ ] Positive family history for VTE                         (3 Points)                 [ ] Acute spinal cord injury (in the previous month)  (5 Points)  [ ] Prothrombin 30744 A                                     (3 Points)                 [ ] Paralysis  (less than 1 month)                             (5 Points)  [ ] Factor V Leiden                                             (3 Points)                  [ ] Multiple Trauma within 1 month                        (5 Points)  [ ] Lupus anticoagulants                                     (3 Points)                                                           [ ] Anticardiolipin antibodies                               (3 Points)                                                       [ ] High homocysteine in the blood                      (3 Points)                                             [ ] Other congenital or acquired thrombophilia      (3 Points)                                                [ ] Heparin induced thrombocytopenia                  (3 Points)                                          Total Score [     8     ]  OPIOID RISK TOOL    FER EACH BOX THAT APPLIES AND ADD TOTALS AT THE END    FAMILY HISTORY OF SUBSTANCE ABUSE                 FEMALE         MALE                                                Alcohol                             [  ]1 pt          [  ]3pts                                               Illegal Durgs                     [  ]2 pts        [  ]3pts                                               Rx Drugs                           [  ]4 pts        [  ]4 pts    PERSONAL HISTORY OF SUBSTANCE ABUSE                                                                                          Alcohol                             [  ]3 pts       [  ]3 pts                                               Illegal Drugs                     [  ]4 pts        [  ]4 pts                                               Rx Drugs                           [  ]5 pts        [  ]5 pts    AGE BETWEEN 16-45 YEARS                                      [  ]1 pt         [  ]1 pt    HISTORY OF PREADOLESCENT   SEXUAL ABUSE                                                             [  ]3 pts        [  ]0pts    PSYCHOLOGICAL DISEASE                     ADD, OCD, Bipolar, Schizophrenia        [  ]2 pts         [  ]2 pts                      Depression                                               [ 1 ]1 pt           [  ]1 pt           SCORING TOTAL   (add numbers and type here)              (*1**)                                     A score of 3 or lower indicated LOW risk for future opioid abuse  A score of 4 to 7 indicated moderate risk for future opioid abuse  A score of 8 or higher indicates a high risk for opioid abuse

## 2019-01-07 NOTE — H&P PST ADULT - HISTORY OF PRESENT ILLNESS
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 Pt recently treated for bacteremia, pt dx with right breast cancer since 2015. Treated with Radiation  and chemotherapy. 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 Pt recently treated for bacteremia, pt dx with metastatic right breast cancer, diagnosed 2015, Treated with Radiation  and chemotherapy, states multiple piccline  insertion due to infection. Received today on her w/c, accompanied by her spouse

## 2019-01-07 NOTE — H&P PST ADULT - PSH
PICC (peripherally inserted central catheter) in place  Left chest wall catheter Inserted October, 2018  S/P biopsy  R breast  S/P thoracentesis  3/2016

## 2019-01-08 ENCOUNTER — NON-APPOINTMENT (OUTPATIENT)
Age: 74
End: 2019-01-08

## 2019-01-08 ENCOUNTER — APPOINTMENT (OUTPATIENT)
Dept: CARDIOLOGY | Facility: CLINIC | Age: 74
End: 2019-01-08
Payer: COMMERCIAL

## 2019-01-08 ENCOUNTER — TRANSCRIPTION ENCOUNTER (OUTPATIENT)
Age: 74
End: 2019-01-08

## 2019-01-08 VITALS
SYSTOLIC BLOOD PRESSURE: 118 MMHG | HEART RATE: 98 BPM | OXYGEN SATURATION: 100 % | DIASTOLIC BLOOD PRESSURE: 73 MMHG | RESPIRATION RATE: 18 BRPM

## 2019-01-08 DIAGNOSIS — E56.9 VITAMIN DEFICIENCY, UNSPECIFIED: ICD-10-CM

## 2019-01-08 DIAGNOSIS — Z01.810 ENCOUNTER FOR PREPROCEDURAL CARDIOVASCULAR EXAMINATION: ICD-10-CM

## 2019-01-08 DIAGNOSIS — I82.621 ACUTE EMBOLISM AND THROMBOSIS OF DEEP VEINS OF RIGHT UPPER EXTREMITY: ICD-10-CM

## 2019-01-08 DIAGNOSIS — E55.9 VITAMIN D DEFICIENCY, UNSPECIFIED: ICD-10-CM

## 2019-01-08 DIAGNOSIS — I87.1 COMPRESSION OF VEIN: ICD-10-CM

## 2019-01-08 DIAGNOSIS — E61.8 DEFICIENCY OF OTHER SPECIFIED NUTRIENT ELEMENTS: ICD-10-CM

## 2019-01-08 PROCEDURE — A9500: CPT

## 2019-01-08 PROCEDURE — 93000 ELECTROCARDIOGRAM COMPLETE: CPT | Mod: 59

## 2019-01-08 PROCEDURE — 93306 TTE W/DOPPLER COMPLETE: CPT

## 2019-01-08 PROCEDURE — 99205 OFFICE O/P NEW HI 60 MIN: CPT | Mod: 25

## 2019-01-08 PROCEDURE — 78452 HT MUSCLE IMAGE SPECT MULT: CPT

## 2019-01-08 PROCEDURE — 0399T: CPT

## 2019-01-08 PROCEDURE — 93015 CV STRESS TEST SUPVJ I&R: CPT

## 2019-01-08 RX ORDER — CIPROFLOXACIN LACTATE 400MG/40ML
1 VIAL (ML) INTRAVENOUS
Qty: 0 | Refills: 0 | COMMUNITY

## 2019-01-08 RX ORDER — BUDESONIDE 3 MG/1
3 CAPSULE ORAL
Refills: 0 | Status: DISCONTINUED | COMMUNITY
End: 2019-01-08

## 2019-01-08 RX ORDER — ACETAMINOPHEN 325 MG/1
TABLET, FILM COATED ORAL
Refills: 0 | Status: DISCONTINUED | COMMUNITY
End: 2019-01-08

## 2019-01-08 RX ORDER — TRIAMCINOLONE ACETONIDE 1 MG/G
0.1 OINTMENT TOPICAL
Qty: 80 | Refills: 0 | Status: DISCONTINUED | COMMUNITY
Start: 2018-09-05

## 2019-01-08 RX ORDER — POTASSIUM CHLORIDE 1.5 G/1.58G
20 POWDER, FOR SOLUTION ORAL
Refills: 0 | Status: DISCONTINUED | COMMUNITY
End: 2019-01-08

## 2019-01-08 RX ORDER — PNV NO.95/FERROUS FUM/FOLIC AC 28MG-0.8MG
TABLET ORAL DAILY
Refills: 0 | Status: ACTIVE | COMMUNITY

## 2019-01-08 RX ORDER — OCTREOTIDE ACETATE 100 UG/ML
100 INJECTION, SOLUTION INTRAVENOUS; SUBCUTANEOUS
Qty: 20 | Refills: 0 | Status: DISCONTINUED | COMMUNITY
Start: 2018-08-02

## 2019-01-08 RX ORDER — LOPERAMIDE HCL 2 MG
CAPSULE ORAL
Refills: 0 | Status: DISCONTINUED | COMMUNITY
End: 2019-01-08

## 2019-01-08 RX ORDER — CLINDAMYCIN PHOSPHATE 10 MG/ML
1 LOTION TOPICAL
Refills: 0 | Status: DISCONTINUED | COMMUNITY
End: 2019-01-08

## 2019-01-08 RX ORDER — MINOCYCLINE HYDROCHLORIDE 100 MG/1
100 CAPSULE ORAL
Qty: 30 | Refills: 0 | Status: DISCONTINUED | COMMUNITY
Start: 2018-05-24

## 2019-01-08 RX ORDER — ERGOCALCIFEROL 1.25 MG/1
1.25 MG CAPSULE, LIQUID FILLED ORAL
Refills: 0 | Status: DISCONTINUED | COMMUNITY
End: 2019-01-08

## 2019-01-08 RX ORDER — MULTIVIT-MIN/FOLIC/VIT K/LYCOP 400-300MCG
50 MCG TABLET ORAL DAILY
Refills: 0 | Status: ACTIVE | COMMUNITY
Start: 2019-01-08

## 2019-01-08 RX ORDER — CEPHALEXIN 500 MG/1
500 CAPSULE ORAL
Qty: 14 | Refills: 0 | Status: DISCONTINUED | COMMUNITY
Start: 2018-09-07

## 2019-01-08 RX ORDER — LIDOCAINE 5% 700 MG/1
5 PATCH TOPICAL
Qty: 30 | Refills: 0 | Status: DISCONTINUED | COMMUNITY
Start: 2018-12-14

## 2019-01-08 RX ORDER — CLOTRIMAZOLE AND BETAMETHASONE DIPROPIONATE 10; .5 MG/G; MG/G
1-0.05 CREAM TOPICAL
Qty: 45 | Refills: 0 | Status: DISCONTINUED | COMMUNITY
Start: 2018-08-17

## 2019-01-08 RX ORDER — MAGNESIUM OXIDE 400 MG
400 CAPSULE ORAL TWICE DAILY
Refills: 0 | Status: ACTIVE | COMMUNITY

## 2019-01-08 RX ORDER — NYSTATIN 100000 1/G
100000 POWDER TOPICAL
Qty: 60 | Refills: 0 | Status: DISCONTINUED | COMMUNITY
Start: 2018-07-25

## 2019-01-09 ENCOUNTER — APPOINTMENT (OUTPATIENT)
Dept: ORTHOPEDIC SURGERY | Facility: HOSPITAL | Age: 74
End: 2019-01-09

## 2019-01-09 ENCOUNTER — RESULT REVIEW (OUTPATIENT)
Age: 74
End: 2019-01-09

## 2019-01-09 ENCOUNTER — INPATIENT (INPATIENT)
Facility: HOSPITAL | Age: 74
LOS: 6 days | Discharge: HOME | DRG: 469 | End: 2019-01-16
Attending: ORTHOPAEDIC SURGERY | Admitting: ORTHOPAEDIC SURGERY
Payer: MEDICARE

## 2019-01-09 VITALS
SYSTOLIC BLOOD PRESSURE: 119 MMHG | OXYGEN SATURATION: 100 % | HEART RATE: 78 BPM | TEMPERATURE: 98 F | RESPIRATION RATE: 16 BRPM | HEIGHT: 66 IN | DIASTOLIC BLOOD PRESSURE: 68 MMHG | WEIGHT: 125 LBS

## 2019-01-09 DIAGNOSIS — M00.869 ARTHRITIS DUE TO OTHER BACTERIA, UNSPECIFIED KNEE: ICD-10-CM

## 2019-01-09 DIAGNOSIS — Z98.89 OTHER SPECIFIED POSTPROCEDURAL STATES: Chronic | ICD-10-CM

## 2019-01-09 DIAGNOSIS — Z45.2 ENCOUNTER FOR ADJUSTMENT AND MANAGEMENT OF VASCULAR ACCESS DEVICE: Chronic | ICD-10-CM

## 2019-01-09 PROBLEM — R78.81 BACTEREMIA: Chronic | Status: ACTIVE | Noted: 2019-01-07

## 2019-01-09 PROBLEM — Z91.89 OTHER SPECIFIED PERSONAL RISK FACTORS, NOT ELSEWHERE CLASSIFIED: Chronic | Status: ACTIVE | Noted: 2019-01-07

## 2019-01-09 LAB
APTT BLD: 34.5 SEC — SIGNIFICANT CHANGE UP (ref 27.5–36.3)
GLUCOSE BLDC GLUCOMTR-MCNC: 112 MG/DL — HIGH (ref 70–99)
GLUCOSE BLDC GLUCOMTR-MCNC: 133 MG/DL — HIGH (ref 70–99)
GLUCOSE BLDC GLUCOMTR-MCNC: 88 MG/DL — SIGNIFICANT CHANGE UP (ref 70–99)
GRAM STN FLD: SIGNIFICANT CHANGE UP
INR BLD: 1.13 RATIO — SIGNIFICANT CHANGE UP (ref 0.88–1.16)
PROTHROM AB SERPL-ACNC: 13.1 SEC — HIGH (ref 10–12.9)
SPECIMEN SOURCE: SIGNIFICANT CHANGE UP

## 2019-01-09 PROCEDURE — 88311 DECALCIFY TISSUE: CPT | Mod: 26

## 2019-01-09 PROCEDURE — 11981 INSERTION DRUG DLVR IMPLANT: CPT

## 2019-01-09 PROCEDURE — 11981 INSERTION DRUG DLVR IMPLANT: CPT | Mod: AS

## 2019-01-09 PROCEDURE — 73502 X-RAY EXAM HIP UNI 2-3 VIEWS: CPT | Mod: 26,LT

## 2019-01-09 PROCEDURE — 88305 TISSUE EXAM BY PATHOLOGIST: CPT | Mod: 26

## 2019-01-09 PROCEDURE — 27130 TOTAL HIP ARTHROPLASTY: CPT | Mod: LT

## 2019-01-09 PROCEDURE — 27130 TOTAL HIP ARTHROPLASTY: CPT | Mod: AS,LT

## 2019-01-09 RX ORDER — CELECOXIB 200 MG/1
400 CAPSULE ORAL ONCE
Qty: 0 | Refills: 0 | Status: COMPLETED | OUTPATIENT
Start: 2019-01-09 | End: 2019-01-09

## 2019-01-09 RX ORDER — CEFEPIME 1 G/1
2000 INJECTION, POWDER, FOR SOLUTION INTRAMUSCULAR; INTRAVENOUS ONCE
Qty: 0 | Refills: 0 | Status: COMPLETED | OUTPATIENT
Start: 2019-01-09 | End: 2019-01-09

## 2019-01-09 RX ORDER — DOCUSATE SODIUM 100 MG
100 CAPSULE ORAL THREE TIMES A DAY
Qty: 0 | Refills: 0 | Status: DISCONTINUED | OUTPATIENT
Start: 2019-01-10 | End: 2019-01-16

## 2019-01-09 RX ORDER — ACETAMINOPHEN 500 MG
1000 TABLET ORAL ONCE
Qty: 0 | Refills: 0 | Status: DISCONTINUED | OUTPATIENT
Start: 2019-01-09 | End: 2019-01-09

## 2019-01-09 RX ORDER — KETOROLAC TROMETHAMINE 30 MG/ML
15 SYRINGE (ML) INJECTION EVERY 6 HOURS
Qty: 0 | Refills: 0 | Status: DISCONTINUED | OUTPATIENT
Start: 2019-01-09 | End: 2019-01-14

## 2019-01-09 RX ORDER — PREGABALIN 225 MG/1
1000 CAPSULE ORAL DAILY
Qty: 0 | Refills: 0 | Status: DISCONTINUED | OUTPATIENT
Start: 2019-01-10 | End: 2019-01-16

## 2019-01-09 RX ORDER — SENNA PLUS 8.6 MG/1
2 TABLET ORAL AT BEDTIME
Qty: 0 | Refills: 0 | Status: DISCONTINUED | OUTPATIENT
Start: 2019-01-10 | End: 2019-01-16

## 2019-01-09 RX ORDER — ACETAMINOPHEN 500 MG
975 TABLET ORAL EVERY 8 HOURS
Qty: 0 | Refills: 0 | Status: DISCONTINUED | OUTPATIENT
Start: 2019-01-10 | End: 2019-01-16

## 2019-01-09 RX ORDER — PYRIDOXINE HCL (VITAMIN B6) 100 MG
100 TABLET ORAL DAILY
Qty: 0 | Refills: 0 | Status: DISCONTINUED | OUTPATIENT
Start: 2019-01-10 | End: 2019-01-16

## 2019-01-09 RX ORDER — AMLODIPINE BESYLATE 2.5 MG/1
5 TABLET ORAL DAILY
Qty: 0 | Refills: 0 | Status: DISCONTINUED | OUTPATIENT
Start: 2019-01-11 | End: 2019-01-14

## 2019-01-09 RX ORDER — SODIUM CHLORIDE 9 MG/ML
1000 INJECTION, SOLUTION INTRAVENOUS
Qty: 0 | Refills: 0 | Status: DISCONTINUED | OUTPATIENT
Start: 2019-01-10 | End: 2019-01-13

## 2019-01-09 RX ORDER — SODIUM CHLORIDE 9 MG/ML
1000 INJECTION, SOLUTION INTRAVENOUS
Qty: 0 | Refills: 0 | Status: DISCONTINUED | OUTPATIENT
Start: 2019-01-09 | End: 2019-01-09

## 2019-01-09 RX ORDER — HYDROMORPHONE HYDROCHLORIDE 2 MG/ML
0.5 INJECTION INTRAMUSCULAR; INTRAVENOUS; SUBCUTANEOUS EVERY 4 HOURS
Qty: 0 | Refills: 0 | Status: DISCONTINUED | OUTPATIENT
Start: 2019-01-10 | End: 2019-01-16

## 2019-01-09 RX ORDER — FENTANYL CITRATE 50 UG/ML
50 INJECTION INTRAVENOUS
Qty: 0 | Refills: 0 | Status: DISCONTINUED | OUTPATIENT
Start: 2019-01-09 | End: 2019-01-09

## 2019-01-09 RX ORDER — PREGABALIN 225 MG/1
1 CAPSULE ORAL
Qty: 0 | Refills: 0 | COMMUNITY

## 2019-01-09 RX ORDER — HYDROMORPHONE HYDROCHLORIDE 2 MG/ML
2 INJECTION INTRAMUSCULAR; INTRAVENOUS; SUBCUTANEOUS
Qty: 0 | Refills: 0 | Status: DISCONTINUED | OUTPATIENT
Start: 2019-01-10 | End: 2019-01-16

## 2019-01-09 RX ORDER — OXYCODONE HYDROCHLORIDE 5 MG/1
5 TABLET ORAL
Qty: 0 | Refills: 0 | Status: DISCONTINUED | OUTPATIENT
Start: 2019-01-10 | End: 2019-01-16

## 2019-01-09 RX ORDER — CEFEPIME 1 G/1
INJECTION, POWDER, FOR SOLUTION INTRAMUSCULAR; INTRAVENOUS
Qty: 0 | Refills: 0 | Status: DISCONTINUED | OUTPATIENT
Start: 2019-01-09 | End: 2019-01-16

## 2019-01-09 RX ORDER — ONDANSETRON 8 MG/1
4 TABLET, FILM COATED ORAL EVERY 6 HOURS
Qty: 0 | Refills: 0 | Status: DISCONTINUED | OUTPATIENT
Start: 2019-01-10 | End: 2019-01-16

## 2019-01-09 RX ORDER — RIVAROXABAN 15 MG-20MG
10 KIT ORAL DAILY
Qty: 0 | Refills: 0 | Status: DISCONTINUED | OUTPATIENT
Start: 2019-01-10 | End: 2019-01-16

## 2019-01-09 RX ORDER — CHOLECALCIFEROL (VITAMIN D3) 125 MCG
2000 CAPSULE ORAL DAILY
Qty: 0 | Refills: 0 | Status: DISCONTINUED | OUTPATIENT
Start: 2019-01-10 | End: 2019-01-16

## 2019-01-09 RX ORDER — PYRIDOXINE HCL (VITAMIN B6) 100 MG
1 TABLET ORAL
Qty: 0 | Refills: 0 | COMMUNITY

## 2019-01-09 RX ORDER — OXYCODONE HYDROCHLORIDE 5 MG/1
10 TABLET ORAL
Qty: 0 | Refills: 0 | Status: DISCONTINUED | OUTPATIENT
Start: 2019-01-10 | End: 2019-01-16

## 2019-01-09 RX ORDER — SCOPALAMINE 1 MG/3D
1 PATCH, EXTENDED RELEASE TRANSDERMAL ONCE
Qty: 0 | Refills: 0 | Status: COMPLETED | OUTPATIENT
Start: 2019-01-09 | End: 2019-01-09

## 2019-01-09 RX ORDER — ONDANSETRON 8 MG/1
4 TABLET, FILM COATED ORAL ONCE
Qty: 0 | Refills: 0 | Status: DISCONTINUED | OUTPATIENT
Start: 2019-01-09 | End: 2019-01-09

## 2019-01-09 RX ORDER — CHOLECALCIFEROL (VITAMIN D3) 125 MCG
1 CAPSULE ORAL
Qty: 0 | Refills: 0 | COMMUNITY

## 2019-01-09 RX ORDER — OXYCODONE HYDROCHLORIDE 5 MG/1
10 TABLET ORAL EVERY 12 HOURS
Qty: 0 | Refills: 0 | Status: DISCONTINUED | OUTPATIENT
Start: 2019-01-10 | End: 2019-01-16

## 2019-01-09 RX ORDER — GABAPENTIN 400 MG/1
300 CAPSULE ORAL ONCE
Qty: 0 | Refills: 0 | Status: COMPLETED | OUTPATIENT
Start: 2019-01-09 | End: 2019-01-09

## 2019-01-09 RX ORDER — CEFEPIME 1 G/1
2000 INJECTION, POWDER, FOR SOLUTION INTRAMUSCULAR; INTRAVENOUS EVERY 12 HOURS
Qty: 0 | Refills: 0 | Status: DISCONTINUED | OUTPATIENT
Start: 2019-01-10 | End: 2019-01-16

## 2019-01-09 RX ADMIN — FENTANYL CITRATE 50 MICROGRAM(S): 50 INJECTION INTRAVENOUS at 19:50

## 2019-01-09 RX ADMIN — SODIUM CHLORIDE 125 MILLILITER(S): 9 INJECTION, SOLUTION INTRAVENOUS at 19:34

## 2019-01-09 RX ADMIN — Medication 15 MILLIGRAM(S): at 22:30

## 2019-01-09 RX ADMIN — GABAPENTIN 300 MILLIGRAM(S): 400 CAPSULE ORAL at 14:36

## 2019-01-09 RX ADMIN — SCOPALAMINE 1 PATCH: 1 PATCH, EXTENDED RELEASE TRANSDERMAL at 14:37

## 2019-01-09 RX ADMIN — CELECOXIB 400 MILLIGRAM(S): 200 CAPSULE ORAL at 15:15

## 2019-01-09 RX ADMIN — CEFEPIME 100 MILLIGRAM(S): 1 INJECTION, POWDER, FOR SOLUTION INTRAMUSCULAR; INTRAVENOUS at 21:37

## 2019-01-09 RX ADMIN — Medication 15 MILLIGRAM(S): at 21:51

## 2019-01-09 RX ADMIN — FENTANYL CITRATE 50 MICROGRAM(S): 50 INJECTION INTRAVENOUS at 20:57

## 2019-01-09 RX ADMIN — CELECOXIB 400 MILLIGRAM(S): 200 CAPSULE ORAL at 14:37

## 2019-01-09 RX ADMIN — FENTANYL CITRATE 50 MICROGRAM(S): 50 INJECTION INTRAVENOUS at 20:00

## 2019-01-09 RX ADMIN — Medication 15 MILLIGRAM(S): at 23:43

## 2019-01-09 RX ADMIN — FENTANYL CITRATE 50 MICROGRAM(S): 50 INJECTION INTRAVENOUS at 19:43

## 2019-01-09 NOTE — BRIEF OPERATIVE NOTE - PROCEDURE
<<-----Click on this checkbox to enter Procedure Total hip arthroplasty  01/09/2019  Left posterior approach Prostalac total hip replacement antibiotic spacer  Active  [email or web address]

## 2019-01-09 NOTE — PHYSICAL EXAM
[General Appearance - Well Developed] : well developed [Normal Appearance] : normal appearance [Well Groomed] : well groomed [General Appearance - Well Nourished] : well nourished [No Deformities] : no deformities [General Appearance - In No Acute Distress] : no acute distress [Normal Conjunctiva] : the conjunctiva exhibited no abnormalities [Eyelids - No Xanthelasma] : the eyelids demonstrated no xanthelasmas [Normal Oral Mucosa] : normal oral mucosa [No Oral Pallor] : no oral pallor [No Oral Cyanosis] : no oral cyanosis [Normal Jugular Venous A Waves Present] : normal jugular venous A waves present [Normal Jugular Venous V Waves Present] : normal jugular venous V waves present [No Jugular Venous Bridges A Waves] : no jugular venous bridges A waves [Respiration, Rhythm And Depth] : normal respiratory rhythm and effort [Exaggerated Use Of Accessory Muscles For Inspiration] : no accessory muscle use [Auscultation Breath Sounds / Voice Sounds] : lungs were clear to auscultation bilaterally [Heart Rate And Rhythm] : heart rate and rhythm were normal [Heart Sounds] : normal S1 and S2 [Murmurs] : no murmurs present [Abdomen Soft] : soft [Abdomen Tenderness] : non-tender [Abdomen Mass (___ Cm)] : no abdominal mass palpated [Nail Clubbing] : no clubbing of the fingernails [Cyanosis, Localized] : no localized cyanosis [Petechial Hemorrhages (___cm)] : no petechial hemorrhages [Skin Color & Pigmentation] : normal skin color and pigmentation [] : no rash [No Venous Stasis] : no venous stasis [Skin Lesions] : no skin lesions [No Skin Ulcers] : no skin ulcer [No Xanthoma] : no  xanthoma was observed [Oriented To Time, Place, And Person] : oriented to person, place, and time [Affect] : the affect was normal [Mood] : the mood was normal [No Anxiety] : not feeling anxious [FreeTextEntry1] : poor skin tugor. dehydrated

## 2019-01-09 NOTE — DISCUSSION/SUMMARY
[As per surgery] : as per surgery [Continue] : Continue medications as currently directed [Additional Diagnostics Recommended] : additional diagnostics recommended [FreeTextEntry1] : This is a 73 year old woman with history of breast ca, stage metastatic to LNs, history of unprovoked DVT, here for Pre-operative cardiovascular risk evaluation and management for left hip surgery\par 1) Pre-operative cardiovascular risk evaluation and management : nuclear stress test and echo . \par if no significant ischemia and normal EF, then proceed for surgery as indicated. \par DVT ppx with anticoagulation after surgery prior history of DVT in right arm. \par 2) Sinus  tachycardia: dehydration: need PO fluids and IV fluids periop. \par

## 2019-01-09 NOTE — PROGRESS NOTE ADULT - SUBJECTIVE AND OBJECTIVE BOX
PA - Note    Ortho Post Op Check    Name: DOMINIK PARKS    MR #: [id number]    Procedure: Removal of Left Total Hip and placement of Antibiotic Spacer  Surgeon: Mahendra Mora    Pt comfortable without complaints, pain controlled  Denies CP, SOB, N/V, numbness/tingling     General Exam:  Vital Signs Last 24 Hrs  T(C): 37.1 (01-09-19 @ 19:17), Max: 37.1 (01-09-19 @ 19:17)  T(F): 98.7 (01-09-19 @ 19:17), Max: 98.7 (01-09-19 @ 19:17)  HR: 99 (01-09-19 @ 21:17) (81 - 99)  BP: 122/63 (01-09-19 @ 21:02) (104/68 - 152/90)  BP(mean): --  RR: 21 (01-09-19 @ 21:17) (11 - 24)  SpO2: 100% (01-09-19 @ 21:17) (100% - 100%)    General: Pt Alert and oriented, NAD, controlled pain.  Dressings C/D/I. No bleeding.  Pulses: 2+ dorsalis pedis pulse. Cap refill < 2 sec.  Sensation: Grossly intact to light touch without deficit.  Motor: + ROM of toes, + Dorsal/Plantar Flexion of Foot    Post-op X-Ray:    A/P: 73yFemale POD#0 s/p Left Hip, Removal of Total Hip Arthroplasty and Placement of Antibiotic Spacer  - Stable  - Pain Control  - DVT ppx: Xarelto, SCDs, FREYA stockings  - Post op abx: Maxipime  - PT eval pending  - Weight bearing status: Partial Weight Bearing  - Follow up OR Cultures

## 2019-01-09 NOTE — HISTORY OF PRESENT ILLNESS
[Preoperative Visit] : for a medical evaluation prior to surgery [Scheduled Procedure ___] : a [unfilled] [Date of Surgery ___] : on [unfilled] [Surgeon Name ___] : surgeon: [unfilled] [Good] : Good [Dyspnea] : dyspnea [Poor Exercise Tolerance] : poor exercise tolerance [Electrocardiogram] : ~T an ECG ~C was performed [Metabolic Capacity ___Mets%] : The patient has a metabolic capacity of [unfilled] Mets%  [Unable to Assess] : Unable to Assess [Pulmonary Disease] : pulmonary disease [Thromboembolic Problems] : thromboembolic problems [Prior Anesthesia] : Prior anesthesia [Fever] : no fever [Chills] : no chills [Fatigue] : no fatigue [Chest Pain] : no chest pain [Cough] : no cough [Dysuria] : no dysuria [Urinary Frequency] : no urinary frequency [Nausea] : no nausea [Vomiting] : no vomiting [Diarrhea] : no diarrhea [Abdominal Pain] : no abdominal pain [Easy Bruising] : no easy bruising [Lower Extremity Swelling] : no lower extremity swelling [Diabetes] : no diabetes [Cardiovascular Disease] : no cardiovascular disease [Anti-Platelet Agents] : no anti-platelet agents [Nicotine Dependence] : no nicotine dependence [Alcohol Use] : no  alcohol use [Renal Disease] : no renal disease [GI Disease] : no gastrointestinal disease [Sleep Apnea] : no sleep apnea [Frequent use of NSAIDs] : no use of NSAIDs [Transfusion Reaction] : no transfusion reaction [Impaired Immunity] : no impaired immunity [Steroid Use in Last 6 Months] : no steroid use in the last six months [Frequent Aspirin Use] : no frequent aspirin use [Prev Anesthesia Reaction] : no previous anesthesia reaction [Anesthesia Reaction] : no anesthesia reaction [Sudden Death] : no sudden death [Clotting Disorder] : no clotting disorder [Bleeding Disorder] : no bleeding disorder [de-identified] : [facility]  [de-identified] : dyspnea on exertion. cannot walk due to hip pain on a wheel chair [FreeTextEntry1] : reason for appt: Pre-operative cardiovascular risk evaluation and management - left hip replacement, encounter for monitoring of cardiotoxic chemotherapy,\par \par Patient has complicated non cardiac history.  \par This is a73 year old woman with stage 4 metastatic cancer diagnosed nov 2015, no surgery chemoradiation and study drug.  patient has history of PIcc line infection and also infection of the left hip.  now for surgery of left hip.  \par cannot walk. dyspnea on exertion. decrease PO intake. \par \par

## 2019-01-09 NOTE — ADDENDUM
[FreeTextEntry1] : echo and nuclear stress test results: Echo normal EF.  Nuclear stress test: Low risk stress test. No significant ischemia.\par No further cardiac work up. Proceed for surgery as indicated.

## 2019-01-10 LAB
ANION GAP SERPL CALC-SCNC: 6 MMOL/L — SIGNIFICANT CHANGE UP (ref 5–17)
APTT BLD: 31.8 SEC — SIGNIFICANT CHANGE UP (ref 27.5–36.3)
BUN SERPL-MCNC: 14 MG/DL — SIGNIFICANT CHANGE UP (ref 8–20)
CALCIUM SERPL-MCNC: 9.2 MG/DL — SIGNIFICANT CHANGE UP (ref 8.6–10.2)
CHLORIDE SERPL-SCNC: 105 MMOL/L — SIGNIFICANT CHANGE UP (ref 98–107)
CO2 SERPL-SCNC: 28 MMOL/L — SIGNIFICANT CHANGE UP (ref 22–29)
CREAT SERPL-MCNC: 0.62 MG/DL — SIGNIFICANT CHANGE UP (ref 0.5–1.3)
GLUCOSE SERPL-MCNC: 103 MG/DL — SIGNIFICANT CHANGE UP (ref 70–115)
HCT VFR BLD CALC: 30.4 % — LOW (ref 37–47)
HGB BLD-MCNC: 9.5 G/DL — LOW (ref 12–16)
INR BLD: 1.1 RATIO — SIGNIFICANT CHANGE UP (ref 0.88–1.16)
MCHC RBC-ENTMCNC: 25.9 PG — LOW (ref 27–31)
MCHC RBC-ENTMCNC: 31.3 G/DL — LOW (ref 32–36)
MCV RBC AUTO: 82.8 FL — SIGNIFICANT CHANGE UP (ref 81–99)
PLATELET # BLD AUTO: 217 K/UL — SIGNIFICANT CHANGE UP (ref 150–400)
POTASSIUM SERPL-MCNC: 4.8 MMOL/L — SIGNIFICANT CHANGE UP (ref 3.5–5.3)
POTASSIUM SERPL-SCNC: 4.8 MMOL/L — SIGNIFICANT CHANGE UP (ref 3.5–5.3)
PROTHROM AB SERPL-ACNC: 12.7 SEC — SIGNIFICANT CHANGE UP (ref 10–12.9)
RBC # BLD: 3.67 M/UL — LOW (ref 4.4–5.2)
RBC # FLD: 16.6 % — HIGH (ref 11–15.6)
SODIUM SERPL-SCNC: 139 MMOL/L — SIGNIFICANT CHANGE UP (ref 135–145)
WBC # BLD: 6 K/UL — SIGNIFICANT CHANGE UP (ref 4.8–10.8)
WBC # FLD AUTO: 6 K/UL — SIGNIFICANT CHANGE UP (ref 4.8–10.8)

## 2019-01-10 PROCEDURE — 99222 1ST HOSP IP/OBS MODERATE 55: CPT

## 2019-01-10 PROCEDURE — 73501 X-RAY EXAM HIP UNI 1 VIEW: CPT | Mod: 26,LT

## 2019-01-10 PROCEDURE — 99223 1ST HOSP IP/OBS HIGH 75: CPT

## 2019-01-10 PROCEDURE — 71045 X-RAY EXAM CHEST 1 VIEW: CPT | Mod: 26

## 2019-01-10 RX ORDER — POTASSIUM CHLORIDE 20 MEQ
20 PACKET (EA) ORAL ONCE
Qty: 0 | Refills: 0 | Status: COMPLETED | OUTPATIENT
Start: 2019-01-10 | End: 2019-01-10

## 2019-01-10 RX ADMIN — OXYCODONE HYDROCHLORIDE 10 MILLIGRAM(S): 5 TABLET ORAL at 05:24

## 2019-01-10 RX ADMIN — Medication 100 MILLIGRAM(S): at 14:35

## 2019-01-10 RX ADMIN — Medication 2000 UNIT(S): at 12:04

## 2019-01-10 RX ADMIN — Medication 975 MILLIGRAM(S): at 21:38

## 2019-01-10 RX ADMIN — Medication 15 MILLIGRAM(S): at 17:47

## 2019-01-10 RX ADMIN — Medication 975 MILLIGRAM(S): at 05:23

## 2019-01-10 RX ADMIN — RIVAROXABAN 10 MILLIGRAM(S): KIT at 12:03

## 2019-01-10 RX ADMIN — OXYCODONE HYDROCHLORIDE 10 MILLIGRAM(S): 5 TABLET ORAL at 17:47

## 2019-01-10 RX ADMIN — OXYCODONE HYDROCHLORIDE 10 MILLIGRAM(S): 5 TABLET ORAL at 06:00

## 2019-01-10 RX ADMIN — Medication 15 MILLIGRAM(S): at 14:45

## 2019-01-10 RX ADMIN — OXYCODONE HYDROCHLORIDE 5 MILLIGRAM(S): 5 TABLET ORAL at 14:45

## 2019-01-10 RX ADMIN — OXYCODONE HYDROCHLORIDE 10 MILLIGRAM(S): 5 TABLET ORAL at 18:30

## 2019-01-10 RX ADMIN — Medication 15 MILLIGRAM(S): at 20:05

## 2019-01-10 RX ADMIN — CEFEPIME 100 MILLIGRAM(S): 1 INJECTION, POWDER, FOR SOLUTION INTRAMUSCULAR; INTRAVENOUS at 05:23

## 2019-01-10 RX ADMIN — Medication 15 MILLIGRAM(S): at 05:24

## 2019-01-10 RX ADMIN — Medication 15 MILLIGRAM(S): at 05:47

## 2019-01-10 RX ADMIN — SODIUM CHLORIDE 100 MILLILITER(S): 9 INJECTION, SOLUTION INTRAVENOUS at 17:46

## 2019-01-10 RX ADMIN — Medication 975 MILLIGRAM(S): at 06:22

## 2019-01-10 RX ADMIN — Medication 15 MILLIGRAM(S): at 00:30

## 2019-01-10 RX ADMIN — OXYCODONE HYDROCHLORIDE 5 MILLIGRAM(S): 5 TABLET ORAL at 12:03

## 2019-01-10 RX ADMIN — SODIUM CHLORIDE 100 MILLILITER(S): 9 INJECTION, SOLUTION INTRAVENOUS at 12:03

## 2019-01-10 RX ADMIN — PREGABALIN 1000 MICROGRAM(S): 225 CAPSULE ORAL at 12:03

## 2019-01-10 RX ADMIN — Medication 975 MILLIGRAM(S): at 20:04

## 2019-01-10 RX ADMIN — CEFEPIME 100 MILLIGRAM(S): 1 INJECTION, POWDER, FOR SOLUTION INTRAMUSCULAR; INTRAVENOUS at 17:47

## 2019-01-10 RX ADMIN — Medication 100 MILLIGRAM(S): at 12:04

## 2019-01-10 RX ADMIN — Medication 100 MILLIGRAM(S): at 21:38

## 2019-01-10 RX ADMIN — Medication 100 MILLIGRAM(S): at 05:24

## 2019-01-10 RX ADMIN — Medication 20 MILLIEQUIVALENT(S): at 05:24

## 2019-01-10 RX ADMIN — SODIUM CHLORIDE 100 MILLILITER(S): 9 INJECTION, SOLUTION INTRAVENOUS at 20:25

## 2019-01-10 RX ADMIN — Medication 15 MILLIGRAM(S): at 12:04

## 2019-01-10 RX ADMIN — Medication 975 MILLIGRAM(S): at 14:36

## 2019-01-10 RX ADMIN — SODIUM CHLORIDE 100 MILLILITER(S): 9 INJECTION, SOLUTION INTRAVENOUS at 14:35

## 2019-01-10 RX ADMIN — Medication 975 MILLIGRAM(S): at 22:30

## 2019-01-10 NOTE — PROGRESS NOTE ADULT - SUBJECTIVE AND OBJECTIVE BOX
Pt seen, chart reviewed.  S/p Left EMILIO, Insertion of Antibiotic Spacer, POD#1.  VSS.  Adequate pain control.  Resting comfortably.   On supplemental O2.  Tolerating PO intake.  No N/V.    No anesthesia problems noted.

## 2019-01-10 NOTE — OCCUPATIONAL THERAPY INITIAL EVALUATION ADULT - PLANNED THERAPY INTERVENTIONS, OT EVAL
balance training/motor coordination training/ADL retraining/bed mobility training/strengthening/transfer training

## 2019-01-10 NOTE — PHYSICAL THERAPY INITIAL EVALUATION ADULT - CAPILLARY REFILL GENERAL
less than/equal to 3 secs/pulses intact to Tyrese feet pulses intact to Tyrese feet/less than/equal to 3 secs

## 2019-01-10 NOTE — PHYSICAL THERAPY INITIAL EVALUATION ADULT - ADDITIONAL COMMENTS
per patient she owns DME, she has a ramp or 2 steps to enter without a hand rail. Pt has been ambulating short distances with the use of a RW due to reports of pain. pt mostly transfers and has been using a bedside commode. Per patient, her  bas been taking her into and out of the home with the use of a RW.

## 2019-01-10 NOTE — PROGRESS NOTE ADULT - SUBJECTIVE AND OBJECTIVE BOX
DOMINIK PARKS    [id number]    History: Patient seen and eval at bedside. Patient is doing well and is comfortable. The patient's pain is controlled using the prescribed pain medications. The patient is participating in physical therapy. Denies nausea, vomiting, chest pain, shortness of breath, abdominal pain or fever. No new complaints.    T(C): 35.9 (01-10-19 @ 04:58), Max: 37.8 (01-09-19 @ 21:17)  HR: 89 (01-10-19 @ 04:58) (78 - 104)  BP: 117/61 (01-10-19 @ 04:58) (103/57 - 152/90)  RR: 18 (01-10-19 @ 04:58) (11 - 24)  SpO2: 100% (01-10-19 @ 04:58) (100% - 100%)                          9.5    6.0   )-----------( 217      ( 10 Iker 2019 08:00 )             30.4     01-10    139  |  105  |  14.0  ----------------------------<  103  4.8   |  28.0  |  0.62    Ca    9.2      10 Iker 2019 08:00    PT/INR - ( 10 Iker 2019 08:00 )   PT: 12.7 sec;   INR: 1.10 ratio         PTT - ( 10 Iker 2019 08:00 )  PTT:31.8 sec    PE: NAD, alert, awake  Left LE:   Hip dressing C/D/I, no drainage, no bleeding  EHL/TA/FHL/GS intact, DP pulse 2+  Gross sensation to LT intact distally, Calf soft, NT B/L    Primary Orthopedic Assessment:  • s/p LEFT posterior hip replacement with Abx spacer POD#1    Plan:   ·	DVT prophylaxis as prescribed -  Xarelto, including use of compression devices and ankle pumps  ·	Continue physical therapy: PWB left LE with assistance of a walker  ·	PICC line placed, CXR ordered to confirm  ·	Incentive spirometry encouraged  ·	Pain control as clinically indicated  ·	f/u OR cultures  ·	Abx as per ID  ·	Posterior hip precautions   ·	Discharge planning: Rehab

## 2019-01-10 NOTE — CONSULT NOTE ADULT - SUBJECTIVE AND OBJECTIVE BOX
73yF was admitted on 01-09 for an elective left THR and antibiotic spacer on 1/9 by Dr. Mora. She is PWB on the left.  Patient worked with PT today and pain was relatively controlled.       REVIEW OF SYSTEMS  Constitutional - No fever, No weight loss, +fatigue  HEENT - No eye pain, No visual disturbances, No difficulty hearing, No tinnitus, No vertigo, No neck pain  Respiratory - No cough, No wheezing, No shortness of breath  Cardiovascular - No chest pain, No palpitations  Gastrointestinal - No abdominal pain, No nausea, No vomiting, No diarrhea, No constipation  Genitourinary - No dysuria, No frequency, No hematuria, No incontinence  Neurological - No headaches, No memory loss, +loss of strength, No numbness, No tremors  Skin - No itching, No rashes, +lesions   Endocrine - No temperature intolerance  Musculoskeletal - +joint pain, +joint swelling, +muscle pain  Psychiatric - No depression, No anxiety    VITALS  T(C): 37 (01-10-19 @ 08:07), Max: 37.8 (01-09-19 @ 21:17)  HR: 89 (01-10-19 @ 08:07) (78 - 104)  BP: 107/62 (01-10-19 @ 08:07) (103/57 - 152/90)  RR: 18 (01-10-19 @ 08:07) (11 - 24)  SpO2: 100% (01-10-19 @ 08:07) (100% - 100%)  Wt(kg): --    PAST MEDICAL & SURGICAL HISTORY  Risk factors for obstructive sleep apnea  Bacteremia  Heart murmur  Essential hypertension  Lymphedema  PICC (peripherally inserted central catheter) in place  DVT of upper extremity (deep vein thrombosis)  Breast cancer  No pertinent past medical history  S/P thoracentesis  S/P biopsy  PICC (peripherally inserted central catheter) in place  S/P lumpectomy, right breast      SOCIAL HISTORY  Smoking - Denied  EtOH - Denied   Drugs - Denied    FUNCTIONAL HISTORY  Lives with spouse, 2 BORIS  Independent +/- RW    CURRENT FUNCTIONAL STATUS  1/10  Bed Mobility: Rolling/Turning:     · Level of [place]	minimum assist (75% patients effort)	  · Physical Assist/Nonphysical Assist	1 person assist	    Bed Mobility: Supine to Sit:     · Level of [place]	minimum assist (75% patients effort)	  · Physical Assist/Nonphysical Assist	1 person assist	    Bed Mobility Analysis:     · Bed Mobility Limitations	decreased ability to use arms for pushing/pulling; decreased ability to use legs for bridging/pushing; impaired ability to control trunk for mobility	  · Impairments Contributing to Impaired Bed Mobility	impaired balance; decreased strength	    Transfer: Sit to Stand:     · Level of [place]	supervision	  · Assistive Device	rolling walker	    Transfer: Stand to Sit:     · Level of [place]	supervision	  · Assistive Device	rolling walker	    Sit/Stand Transfer Safety Analysis:     · Transfer Safety Concerns Noted	decreased weight-shifting ability	  · Impairments Contributing to Impaired Transfers	impaired balance; decreased strength	    Gait Skills:     · Level of [place]	stand-by assist	  · Weight-Bearing Restrictions	PWB left LE	        FAMILY HISTORY   Family history of breast cancer in first degree relative (Sibling)  Family history of lung cancer (Father)  Family history of cancer of frontal sinus (Father)      RECENT LABS/IMAGING  CBC Full  -  ( 10 Iker 2019 08:00 )  WBC Count : 6.0 K/uL  Hemoglobin : 9.5 g/dL  Hematocrit : 30.4 %  Platelet Count - Automated : 217 K/uL  Mean Cell Volume : 82.8 fl  Mean Cell Hemoglobin : 25.9 pg  Mean Cell Hemoglobin Concentration : 31.3 g/dL  Auto Neutrophil # : x  Auto Lymphocyte # : x  Auto Monocyte # : x  Auto Eosinophil # : x  Auto Basophil # : x  Auto Neutrophil % : x  Auto Lymphocyte % : x  Auto Monocyte % : x  Auto Eosinophil % : x  Auto Basophil % : x    01-10    139  |  105  |  14.0  ----------------------------<  103  4.8   |  28.0  |  0.62    Ca    9.2      10 Iker 2019 08:00          ALLERGIES  No Known Allergies      MEDICATIONS   acetaminophen   Tablet .. 975 milliGRAM(s) Oral every 8 hours  aluminum hydroxide/magnesium hydroxide/simethicone Suspension 30 milliLiter(s) Oral four times a day PRN  cefepime   IVPB      cefepime   IVPB 2000 milliGRAM(s) IV Intermittent every 12 hours  cholecalciferol 2000 Unit(s) Oral daily  cyanocobalamin 1000 MICROGram(s) Oral daily  docusate sodium 100 milliGRAM(s) Oral three times a day  HYDROmorphone   Tablet 2 milliGRAM(s) Oral every 3 hours PRN  HYDROmorphone  Injectable 0.5 milliGRAM(s) IV Push every 4 hours PRN  ketorolac   Injectable 15 milliGRAM(s) IV Push every 6 hours  lactated ringers. 1000 milliLiter(s) IV Continuous <Continuous>  ondansetron Injectable 4 milliGRAM(s) IV Push every 6 hours PRN  oxyCODONE    IR 5 milliGRAM(s) Oral every 3 hours PRN  oxyCODONE    IR 10 milliGRAM(s) Oral every 3 hours PRN  oxyCODONE  ER Tablet 10 milliGRAM(s) Oral every 12 hours  pyridoxine 100 milliGRAM(s) Oral daily  rivaroxaban 10 milliGRAM(s) Oral daily  senna 2 Tablet(s) Oral at bedtime PRN      ----------------------------------------------------------------------------------------  PHYSICAL EXAM  Constitutional - NAD, Comfortable  HEENT - NCAT, EOMI  Neck - Supple, No limited ROM  Chest - Breathing comfortably, No wheezing  Cardiovascular - S1S2   Abdomen - Soft, Obese  Extremities - Left hip incision - CDI - dressing intact   Neurologic Exam -                    Cognitive - Awake, Alert, AAO to self, place, date, year, situation     Motor -                      LEFT    UE - ShAB 5/5, EF 5/5, EE 5/5, WE 5/5,  5/5                    RIGHT UE - ShAB 5/5, EF 5/5, EE 5/5, WE 5/5,  5/5                    LEFT    LE - HF -/5, KE 4/5, DF 5/5, PF 5/5                    RIGHT LE - HF 5/5, KE 5/5, DF 5/5, PF 5/5        Sensory - Intact to LT  Psychiatric - Mood stable, Affect WNL  ----------------------------------------------------------------------------------------  ASSESSMENT/PLAN  73yFemale with functional deficits after left THR and antibiotic spacer  Infected Left THR - PWB, Hip precautions, Cefepime  Pain - Tylenol, DIlaudid, Toradol, Oxycodone  DVT PPX - SCDs, Xarelto  Rehab -  Expect patient to achieve functional goals for DC HOME with HOME CARE
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        REVIEW OF SYSTEMS:    CONSTITUTIONAL: No fever, some fatigue  RESPIRATORY: No cough, No shortness of breath  CARDIOVASCULAR: No chest pain, palpitations  GASTROINTESTINAL: No abdominal, No nausea, vomiting  NEUROLOGICAL: No headaches, loss of strength.  MISCELLANEOUS: left hip pain is well controlled  All systems reviewed negative except mentioned above.       Allergies:  	No Known Allergies:     Home Medications:   * Incomplete Medication History as of 08-Jan-2019 09:31 documented in Structured Notes  · 	rivaroxaban 10 mg oral tablet: Last Dose Taken:  , 1 tab(s) orally every 24 hours  · 	Rolling walker: Last Dose Taken:  , Unsteady gait  	ICD 10- R26.81  · 	acetaminophen-oxyCODONE 325 mg-5 mg oral tablet: Last Dose Taken:  , 1 tab(s) orally every 6 hours, As Needed - Moderate Pain  · 	amLODIPine 5 mg oral tablet: Last Dose Taken:  , 1 tab(s) orally once a day  · 	magnesium citrate: Last Dose Taken:  , 400 cap(s) orally 2 times a day  · 	Vitamin B-12 1000 mcg oral tablet: 1 tab(s) orally once a day  · 	Vitamin D3 2000 intl units oral tablet: 1 tab(s) orally once a day  · 	Vitamin B6 100 mg oral tablet: 1 tab(s) orally once a day  · 	Cipro 500 mg oral tablet: Last Dose Taken:  , 1 tab(s) orally every 12 hours      Past Medical History:  Bacteremia    Breast cancer  right breast  DVT of upper extremity (deep vein thrombosis)  RIGHT  Essential hypertension    Heart murmur    Lymphedema    Risk factors for obstructive sleep apnea.     Past Surgical History:  PICC (peripherally inserted central catheter) in place  Left chest wall catheter Inserted October, 2018  S/P biopsy  R breast  S/P thoracentesis  3/2016.     Family History:  Sibling  Still living? Unknown  Family history of breast cancer in first degree relative, Age at diagnosis: Age Unknown     Father  Still living? No  Family history of cancer of frontal sinus, Age at diagnosis: Age Unknown  Family history of lung cancer, Age at diagnosis: Age Unknown.     Social History:  · Marital Status	  · Occupation	Retired  · Lives With	spouse     Substance Use History:  · Substance Use	caffeine  denies drug use  · Caffeine Type	tea; pop/soda  · Caffeine Amount/Frequency	1-2 cups/cans per day  · Caffeine Withdrawal Pattern	intense desire for caffeine     Alcohol Use History:  · Have you ever consumed alcohol	never     Tobacco Usage:  · Tobacco Usage: Never smoker     Passive Smoke Exposure:  · Passive Smoke Exposure	Yes...  · Passive Comment	Father      PHYSICAL EXAM:    GENERAL: Elderly female looking comfortable  HEENT: PERRL, +EOMI  NECK: soft, Supple, No JVD, 	  CHEST/LUNG: Clear to auscultate bilaterally; No wheezing  HEART: S1S2+, Regular rate and rhythm; No murmurs  ABDOMEN: Soft, Non tender, Non distended; Bowel sounds present  EXTREMITIES:  2+ Peripheral Pulses, No edema, Left hip surgical wound with dressings on, no bleeding or soaking.   SKIN: No rashes or lesions  NEURO: AAOX3, no focal deficits, no motor r sensory loss  PSYCH: normal mood.
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,   Pt recently treated for bacteremia, pt dx with metastatic right breast cancer, diagnosed 2015, Treated with Radiation  and chemotherapy,    i   PT WITH POSITIVE CULTURE  PSEUDOMONAS STARTED ON CIPRO UNTIL OR AS PT WITH INCREASING PAIN  ASKED TO EVALUATE FROM ID STANDPOINT         PAST MEDICAL & SURGICAL HISTORY:  Risk factors for obstructive sleep apnea  Bacteremia  Heart murmur  Essential hypertension  Lymphedema  DVT of upper extremity (deep vein thrombosis): RIGHT  Breast cancer: right breast  S/P thoracentesis: 3/2016  S/P biopsy: R breast  PICC (peripherally inserted central catheter) in place: Left chest wall catheter Inserted October, 2018      ANTIBIOTICS  cefepime   IVPB      cefepime   IVPB 2000 milliGRAM(s) IV Intermittent every 12 hours      Allergies    No Known Allergies    Intolerances        SOCIAL HISTORY:       FAMILY HX   FAMILY HISTORY:  Family history of breast cancer in first degree relative (Sibling): s/p mastectomy  Family history of lung cancer (Father)  Family history of cancer of frontal sinus (Father): followed by enucleation      Vital Signs Last 24 Hrs  T(C): 37 (10 Iker 2019 08:07), Max: 37.8 (09 Jan 2019 21:17)  T(F): 98.6 (10 Iker 2019 08:07), Max: 100 (09 Jan 2019 21:17)  HR: 89 (10 Iker 2019 08:07) (80 - 104)  BP: 107/62 (10 Iker 2019 08:07) (103/57 - 152/90)  BP(mean): --  RR: 18 (10 Iker 2019 08:07) (11 - 24)  SpO2: 100% (10 Iker 2019 08:07) (100% - 100%)  Drug Dosing Weight  Height (cm): 167.64 (09 Jan 2019 12:59)  Weight (kg): 56.7 (09 Jan 2019 12:59)  BMI (kg/m2): 20.2 (09 Jan 2019 12:59)  BSA (m2): 1.64 (09 Jan 2019 12:59)      REVIEW OF SYSTEMS:    CONSTITUTIONAL:  As per HPI.    HEENT:  Eyes:  No diplopia or blurred vision. ENT:  No earache, sore throat or runny nose.    CARDIOVASCULAR:  No pressure, squeezing, strangling, tightness, heaviness or aching about the chest, neck, axilla or epigastrium.    RESPIRATORY:  No cough, shortness of breath, PND or orthopnea.    GASTROINTESTINAL:  No nausea, vomiting or diarrhea.    GENITOURINARY:  No dysuria, frequency or urgency.    MUSCULOSKELETAL:  As per HPI.    SKIN:  No change in skin, hair or nails.    NEUROLOGIC:  No paresthesias, fasciculations, seizures or weakness.                  PHYSICAL EXAMINATION:    GENERAL: The patient is a well-developed, well-nourished _ IN NAD    VITAL SIGNS: T(C): 37 (01-10-19 @ 08:07), Max: 37.8 (01-09-19 @ 21:17)  HR: 89 (01-10-19 @ 08:07) (80 - 104)  BP: 107/62 (01-10-19 @ 08:07) (103/57 - 152/90)  RR: 18 (01-10-19 @ 08:07) (11 - 24)  SpO2: 100% (01-10-19 @ 08:07) (100% - 100%)  Wt(kg): --    HEENT: Head is normocephalic and atraumatic.  ANICTERIC  NECK: Supple. No carotid bruits.  No lymphadenopathy or thyromegaly.    LUNGS:COARSE BREATH SOUNDS    HEART: Regular rate and rhythm without murmur.    ABDOMEN: Soft, nontender, and nondistended.  Positive bowel sounds.  No hepatosplenomegaly was noted. NO REBOUND NO GUARDING    EXTREMITIES: SURGICAL DRESSING IN PLACE    NEUROLOGIC: NON FOCAL      SKIN: No ulceration or induration present. 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

## 2019-01-10 NOTE — CONSULT NOTE ADULT - ASSESSMENT
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     Plan:     LEFT hip replacement with Abx spacer: Pain meds, DVT prophylaxis, Perioperative antibiotics as per Primary team, PT eval, meds to prevent constipation, Incentive spirometry, will continue with IV fluids for now.      DVT of right upper extremity: Continue with rivaroxaban 10 mg every 24 hours.     HTN: Will continue with amlodipine 5 mg once a day with holding parameters.    DVT prophylaxis as Ortho Service.

## 2019-01-10 NOTE — PHYSICAL THERAPY INITIAL EVALUATION ADULT - PERTINENT HX OF CURRENT PROBLEM, REHAB EVAL
Pt with hx of breast CA and mets presents to [facility] with reports of worsening left hip pain and difficulty walking

## 2019-01-11 ENCOUNTER — APPOINTMENT (OUTPATIENT)
Dept: ORTHOPEDIC SURGERY | Facility: HOSPITAL | Age: 74
End: 2019-01-11

## 2019-01-11 LAB
ANION GAP SERPL CALC-SCNC: 8 MMOL/L — SIGNIFICANT CHANGE UP (ref 5–17)
BUN SERPL-MCNC: 13 MG/DL — SIGNIFICANT CHANGE UP (ref 8–20)
CALCIUM SERPL-MCNC: 9 MG/DL — SIGNIFICANT CHANGE UP (ref 8.6–10.2)
CHLORIDE SERPL-SCNC: 106 MMOL/L — SIGNIFICANT CHANGE UP (ref 98–107)
CO2 SERPL-SCNC: 25 MMOL/L — SIGNIFICANT CHANGE UP (ref 22–29)
CREAT SERPL-MCNC: 0.58 MG/DL — SIGNIFICANT CHANGE UP (ref 0.5–1.3)
GLUCOSE SERPL-MCNC: 113 MG/DL — SIGNIFICANT CHANGE UP (ref 70–115)
HCT VFR BLD CALC: 26.6 % — LOW (ref 37–47)
HGB BLD-MCNC: 8.1 G/DL — LOW (ref 12–16)
MCHC RBC-ENTMCNC: 25.6 PG — LOW (ref 27–31)
MCHC RBC-ENTMCNC: 30.5 G/DL — LOW (ref 32–36)
MCV RBC AUTO: 84.2 FL — SIGNIFICANT CHANGE UP (ref 81–99)
PLATELET # BLD AUTO: 186 K/UL — SIGNIFICANT CHANGE UP (ref 150–400)
POTASSIUM SERPL-MCNC: 4.3 MMOL/L — SIGNIFICANT CHANGE UP (ref 3.5–5.3)
POTASSIUM SERPL-SCNC: 4.3 MMOL/L — SIGNIFICANT CHANGE UP (ref 3.5–5.3)
RBC # BLD: 3.16 M/UL — LOW (ref 4.4–5.2)
RBC # FLD: 16.7 % — HIGH (ref 11–15.6)
SODIUM SERPL-SCNC: 139 MMOL/L — SIGNIFICANT CHANGE UP (ref 135–145)
WBC # BLD: 4.8 K/UL — SIGNIFICANT CHANGE UP (ref 4.8–10.8)
WBC # FLD AUTO: 4.8 K/UL — SIGNIFICANT CHANGE UP (ref 4.8–10.8)

## 2019-01-11 PROCEDURE — 99233 SBSQ HOSP IP/OBS HIGH 50: CPT

## 2019-01-11 PROCEDURE — 99232 SBSQ HOSP IP/OBS MODERATE 35: CPT

## 2019-01-11 RX ADMIN — Medication 15 MILLIGRAM(S): at 17:40

## 2019-01-11 RX ADMIN — SODIUM CHLORIDE 3 MILLILITER(S): 9 INJECTION INTRAMUSCULAR; INTRAVENOUS; SUBCUTANEOUS at 21:47

## 2019-01-11 RX ADMIN — Medication 2000 UNIT(S): at 11:59

## 2019-01-11 RX ADMIN — RIVAROXABAN 10 MILLIGRAM(S): KIT at 11:58

## 2019-01-11 RX ADMIN — OXYCODONE HYDROCHLORIDE 10 MILLIGRAM(S): 5 TABLET ORAL at 06:11

## 2019-01-11 RX ADMIN — Medication 15 MILLIGRAM(S): at 11:58

## 2019-01-11 RX ADMIN — Medication 15 MILLIGRAM(S): at 07:00

## 2019-01-11 RX ADMIN — Medication 15 MILLIGRAM(S): at 23:40

## 2019-01-11 RX ADMIN — SODIUM CHLORIDE 3 MILLILITER(S): 9 INJECTION INTRAMUSCULAR; INTRAVENOUS; SUBCUTANEOUS at 06:12

## 2019-01-11 RX ADMIN — SODIUM CHLORIDE 3 MILLILITER(S): 9 INJECTION INTRAMUSCULAR; INTRAVENOUS; SUBCUTANEOUS at 13:28

## 2019-01-11 RX ADMIN — Medication 15 MILLIGRAM(S): at 17:25

## 2019-01-11 RX ADMIN — CEFEPIME 100 MILLIGRAM(S): 1 INJECTION, POWDER, FOR SOLUTION INTRAMUSCULAR; INTRAVENOUS at 17:25

## 2019-01-11 RX ADMIN — Medication 100 MILLIGRAM(S): at 13:24

## 2019-01-11 RX ADMIN — Medication 975 MILLIGRAM(S): at 13:24

## 2019-01-11 RX ADMIN — Medication 15 MILLIGRAM(S): at 23:41

## 2019-01-11 RX ADMIN — Medication 15 MILLIGRAM(S): at 00:45

## 2019-01-11 RX ADMIN — OXYCODONE HYDROCHLORIDE 10 MILLIGRAM(S): 5 TABLET ORAL at 07:00

## 2019-01-11 RX ADMIN — SCOPALAMINE 1 PATCH: 1 PATCH, EXTENDED RELEASE TRANSDERMAL at 19:04

## 2019-01-11 RX ADMIN — Medication 100 MILLIGRAM(S): at 06:12

## 2019-01-11 RX ADMIN — Medication 975 MILLIGRAM(S): at 07:00

## 2019-01-11 RX ADMIN — Medication 15 MILLIGRAM(S): at 01:00

## 2019-01-11 RX ADMIN — OXYCODONE HYDROCHLORIDE 10 MILLIGRAM(S): 5 TABLET ORAL at 18:20

## 2019-01-11 RX ADMIN — CEFEPIME 100 MILLIGRAM(S): 1 INJECTION, POWDER, FOR SOLUTION INTRAMUSCULAR; INTRAVENOUS at 06:11

## 2019-01-11 RX ADMIN — Medication 100 MILLIGRAM(S): at 21:46

## 2019-01-11 RX ADMIN — Medication 975 MILLIGRAM(S): at 21:47

## 2019-01-11 RX ADMIN — Medication 975 MILLIGRAM(S): at 06:11

## 2019-01-11 RX ADMIN — Medication 15 MILLIGRAM(S): at 12:50

## 2019-01-11 RX ADMIN — OXYCODONE HYDROCHLORIDE 10 MILLIGRAM(S): 5 TABLET ORAL at 17:24

## 2019-01-11 RX ADMIN — PREGABALIN 1000 MICROGRAM(S): 225 CAPSULE ORAL at 11:58

## 2019-01-11 RX ADMIN — Medication 15 MILLIGRAM(S): at 06:11

## 2019-01-11 RX ADMIN — Medication 975 MILLIGRAM(S): at 21:46

## 2019-01-11 RX ADMIN — Medication 100 MILLIGRAM(S): at 11:59

## 2019-01-11 RX ADMIN — Medication 975 MILLIGRAM(S): at 14:20

## 2019-01-11 NOTE — PROGRESS NOTE ADULT - SUBJECTIVE AND OBJECTIVE BOX
[facility]  INFECTIOUS DISEASES AND INTERNAL MEDICINE at [place]  =======================================================  Murray Duncan MD  Diplomates American Board of Internal Medicine and Infectious Diseases  =======================================================    DOMINIK PARKS [id number]    Follow up: left hip    Allergies:  No Known Allergies      Medications:  acetaminophen   Tablet .. 975 milliGRAM(s) Oral every 8 hours  aluminum hydroxide/magnesium hydroxide/simethicone Suspension 30 milliLiter(s) Oral four times a day PRN  amLODIPine   Tablet 5 milliGRAM(s) Oral daily  cefepime   IVPB      cefepime   IVPB 2000 milliGRAM(s) IV Intermittent every 12 hours  cholecalciferol 2000 Unit(s) Oral daily  cyanocobalamin 1000 MICROGram(s) Oral daily  docusate sodium 100 milliGRAM(s) Oral three times a day  HYDROmorphone   Tablet 2 milliGRAM(s) Oral every 3 hours PRN  HYDROmorphone  Injectable 0.5 milliGRAM(s) IV Push every 4 hours PRN  ketorolac   Injectable 15 milliGRAM(s) IV Push every 6 hours  lactated ringers. 1000 milliLiter(s) IV Continuous <Continuous>  ondansetron Injectable 4 milliGRAM(s) IV Push every 6 hours PRN  oxyCODONE    IR 5 milliGRAM(s) Oral every 3 hours PRN  oxyCODONE    IR 10 milliGRAM(s) Oral every 3 hours PRN  oxyCODONE  ER Tablet 10 milliGRAM(s) Oral every 12 hours  pyridoxine 100 milliGRAM(s) Oral daily  rivaroxaban 10 milliGRAM(s) Oral daily  senna 2 Tablet(s) Oral at bedtime PRN  sodium chloride 0.9% lock flush 3 milliLiter(s) IV Push every 8 hours    SOCIAL       FAMILY   FAMILY HISTORY:  Family history of breast cancer in first degree relative (Sibling): s/p mastectomy  Family history of lung cancer (Father)  Family history of cancer of frontal sinus (Father): followed by enucleation    REVIEW OF SYSTEMS:  CONSTITUTIONAL:  No Fever or chills  HEENT:   No diplopia or blurred vision.  No earache, sore throat or runny nose.  CARDIOVASCULAR:  No pressure, squeezing, strangling, tightness, heaviness or aching about the chest, neck, axilla or epigastrium.  RESPIRATORY:  No cough, shortness of breath, PND or orthopnea.  GASTROINTESTINAL:  No nausea, vomiting or diarrhea.  GENITOURINARY:  No dysuria, frequency or urgency. No Blood in urine  MUSCULOSKELETAL:   AS PER HPI  SKIN:  No change in skin, hair or nails.  NEUROLOGIC:  No paresthesias, fasciculations, seizures or weakness.  PSYCHIATRIC:  No disorder of thought or mood.  ENDOCRINE:  No heat or cold intolerance, polyuria or polydipsia.  HEMATOLOGICAL:  No easy bruising or bleeding.            Physical Exam:  ICU Vital Signs Last 24 Hrs  T(C): 37 (11 Jan 2019 07:55), Max: 37 (11 Jan 2019 07:55)  T(F): 98.6 (11 Jan 2019 07:55), Max: 98.6 (11 Jan 2019 07:55)  HR: 72 (11 Jan 2019 07:55) (72 - 83)  BP: 108/60 (11 Jan 2019 07:55) (103/55 - 118/67)  BP(mean): --  ABP: --  ABP(mean): --  RR: 18 (11 Jan 2019 07:55) (18 - 18)  SpO2: 98% (11 Jan 2019 07:55) (96% - 99%)    GEN: NAD, pleasant  HEENT: normocephalic and atraumatic. EOMI. BONY.    NECK: Supple. No carotid bruits.  No lymphadenopathy or thyromegaly.  LUNGS: Clear to auscultation.  HEART: Regular rate and rhythm without murmur.  ABDOMEN: Soft, nontender, and nondistended.  Positive bowel sounds.    : No CVA tenderness  EXTREMITIES: Without any cyanosis, clubbing, rash, lesions or edema.  MSK: no joint swelling  NEUROLOGIC: Cranial nerves II through XII are grossly intact.  PSYCHIATRIC: Appropriate affect .  SKIN: No ulceration or induration present.        Labs:  01-11    139  |  106  |  13.0  ----------------------------<  113  4.3   |  25.0  |  0.58    Ca    9.0      11 Jan 2019 07:59                            8.1    4.8   )-----------( 186      ( 11 Jan 2019 07:59 )             26.6       PT/INR - ( 10 Iker 2019 08:00 )   PT: 12.7 sec;   INR: 1.10 ratio         PTT - ( 10 Iker 2019 08:00 )  PTT:31.8 sec          CAPILLARY BLOOD GLUCOSE            RECENT CULTURES:  01-09 @ 21:28 .Surgical Swab left  hip acetabular (swabs)     No growth at 2 days.  Culture in progress    No WBC's seen.  No organisms seen

## 2019-01-11 NOTE — PROGRESS NOTE ADULT - ASSESSMENT
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

## 2019-01-11 NOTE — PROGRESS NOTE ADULT - SUBJECTIVE AND OBJECTIVE BOX
DOMINIK PARKS    [id number]    History: Patient seen and eval at bedside. Patient is doing well and is comfortable. The patient's pain is controlled using the prescribed pain medications. The patient is participating in physical therapy. Denies nausea, vomiting, chest pain, shortness of breath, abdominal pain or fever. No new complaints.    T(C): 36.8 (01-11-19 @ 04:47), Max: 37 (01-10-19 @ 08:07)  HR: 76 (01-11-19 @ 04:47) (76 - 89)  BP: 103/55 (01-11-19 @ 04:47) (103/55 - 118/67)  RR: 18 (01-11-19 @ 04:47) (18 - 18)  SpO2: 96% (01-11-19 @ 04:47) (96% - 100%)                          9.5    6.0   )-----------( 217      ( 10 Iker 2019 08:00 )             30.4     01-10    139  |  105  |  14.0  ----------------------------<  103  4.8   |  28.0  |  0.62    Ca    9.2      10 Iker 2019 08:00    PE: NAD, alert, awake  Left LE:   Hip dressing C/D/I, no drainage, no bleeding, incision healing well, no erythema, blisters, maceration or s/o infx  EHL/TA/FHL/GS intact, DP pulse 2+  Gross sensation to LT intact distally, Calf soft, NT B/L  New sterile honeycomb dressing placed    Culture - Surgical Swab (01.09.19 @ 21:28)    Gram Stain:   No WBC's seen.  No organisms seen    Specimen Source: .Surgical Swab left hip synovium (swabs)    Culture Results:   No growth at 1 day.  Culture in progress    Culture - Surgical Swab (01.09.19 @ 21:28)    Gram Stain:   Moderate White blood cells  No organisms seen    Specimen Source: .Surgical Swab left hip synovial fluid (swabs)    Culture Results:   No growth at 1 day.  Culture in progress    Culture - Surgical Swab (01.09.19 @ 21:28)    Gram Stain:   No WBC's seen.  No organisms seen    Specimen Source: .Surgical Swab left  hip acetabular (swabs)    Culture Results:   No growth at 1 day.  Culture in progress    Primary Orthopedic Assessment:  • s/p LEFT POSTERIOR total hip replacement POD#2    Plan:   ·	DVT prophylaxis as prescribed - Xarelto, including use of compression devices and ankle pumps  ·	Continue physical therapy: Weightbearing as tolerated of the left lower extremity with assistance of a walker  ·	PICC line placed left chest - D/w Dr. Dang - may continue use for Abx administration  ·	Abx as per ID  ·	Med following  ·	Incentive spirometry encouraged  ·	Pain control as clinically indicated  ·	f/u OR cultures  ·	Abduction pillow while in bed  ·	Posterior hip precautions   ·	Discharge planning: early home next week

## 2019-01-11 NOTE — PROGRESS NOTE ADULT - SUBJECTIVE AND OBJECTIVE BOX
DOMINIK PARKS    [id number]    73y      Female    Patient is a 73y old  Female who presents with a chief complaint of HIP PAIN (10 Iker 2019 14:02)      INTERVAL HPI/OVERNIGHT EVENTS:    Patient's pain is well controlled, denies fever, chills, chest pain, nausea, vomiting, dizziness.     REVIEW OF SYSTEMS:    CONSTITUTIONAL: No fever, some fatigue  RESPIRATORY: No cough, No shortness of breath  CARDIOVASCULAR: No chest pain, palpitations  GASTROINTESTINAL: No abdominal, No nausea, vomiting  NEUROLOGICAL: No headaches, loss of strength.  MISCELLANEOUS: left hip pain is well controlled      Vital Signs Last 24 Hrs  T(C): 37 (11 Jan 2019 07:55), Max: 37 (11 Jan 2019 07:55)  T(F): 98.6 (11 Jan 2019 07:55), Max: 98.6 (11 Jan 2019 07:55)  HR: 72 (11 Jan 2019 07:55) (72 - 83)  BP: 108/60 (11 Jan 2019 07:55) (103/55 - 118/67)  RR: 18 (11 Jan 2019 07:55) (18 - 18)  SpO2: 98% (11 Jan 2019 07:55) (96% - 99%)    PHYSICAL EXAM:    GENERAL: Elderly female looking comfortable  HEENT: PERRL, +EOMI  NECK: soft, Supple, No JVD, 	  CHEST/LUNG: Clear to auscultate bilaterally; No wheezing  HEART: S1S2+, Regular rate and rhythm; No murmurs  ABDOMEN: Soft, Non tender, Non distended; Bowel sounds present  EXTREMITIES:  2+ Peripheral Pulses, No edema, Left hip surgical wound with dressings on, no bleeding or soaking.   SKIN: No rashes or lesions  NEURO: AAOX3, no focal deficits, no motor r sensory loss  PSYCH: normal mood.       LABS:                        8.1    4.8   )-----------( 186      ( 11 Jan 2019 07:59 )             26.6     01-11    139  |  106  |  13.0  ----------------------------<  113  4.3   |  25.0  |  0.58    Ca    9.0      11 Jan 2019 07:59      PT/INR - ( 10 Iker 2019 08:00 )   PT: 12.7 sec;   INR: 1.10 ratio         PTT - ( 10 Iker 2019 08:00 )  PTT:31.8 sec        I&O's Summary    10 Iker 2019 07:01  -  11 Jan 2019 07:00  --------------------------------------------------------  IN: 2050 mL / OUT: 1350 mL / NET: 700 mL        MEDICATIONS  (STANDING):  acetaminophen   Tablet .. 975 milliGRAM(s) Oral every 8 hours  amLODIPine   Tablet 5 milliGRAM(s) Oral daily  cefepime   IVPB      cefepime   IVPB 2000 milliGRAM(s) IV Intermittent every 12 hours  cholecalciferol 2000 Unit(s) Oral daily  cyanocobalamin 1000 MICROGram(s) Oral daily  docusate sodium 100 milliGRAM(s) Oral three times a day  ketorolac   Injectable 15 milliGRAM(s) IV Push every 6 hours  lactated ringers. 1000 milliLiter(s) (100 mL/Hr) IV Continuous <Continuous>  oxyCODONE  ER Tablet 10 milliGRAM(s) Oral every 12 hours  pyridoxine 100 milliGRAM(s) Oral daily  rivaroxaban 10 milliGRAM(s) Oral daily  sodium chloride 0.9% lock flush 3 milliLiter(s) IV Push every 8 hours    MEDICATIONS  (PRN):  aluminum hydroxide/magnesium hydroxide/simethicone Suspension 30 milliLiter(s) Oral four times a day PRN Indigestion  HYDROmorphone   Tablet 2 milliGRAM(s) Oral every 3 hours PRN Severe Pain (7 - 10)  HYDROmorphone  Injectable 0.5 milliGRAM(s) IV Push every 4 hours PRN Severe Pain (7 - 10)  ondansetron Injectable 4 milliGRAM(s) IV Push every 6 hours PRN Nausea and/or Vomiting  oxyCODONE    IR 5 milliGRAM(s) Oral every 3 hours PRN Mild Pain (1 - 3)  oxyCODONE    IR 10 milliGRAM(s) Oral every 3 hours PRN Moderate Pain (4 - 6)  senna 2 Tablet(s) Oral at bedtime PRN Constipation

## 2019-01-11 NOTE — CDI QUERY NOTE - NSCDIOTHERTXTBX_GEN_ALL_CORE_HH
Per documentation patient received 2 units of PRBC in OR, hemoglobin level dropped from 10.3 down to 8.1 on 1/11/19. Can you provide diagnosis based on clinical findings and interventions provided?    A.	Acute blood loss anemia  B.	Postoperative blood loss anemia  C.	Other, please specify  D.	Not clinically significant    Supporting documentations:    Laboratory hemoglobin and hematocrit:  1/10/19 0800:   hgb- 9.5   and hct 30.4  1/11/19 @ 0759: hgb 8.1 and hct 26.6     Operative report:  ESTIMATED BLOOD LOSS:  600 mL.  IV FLUIDS:  700 mL Lactated Ringers, 2 units packed red blood cells.

## 2019-01-11 NOTE — PROGRESS NOTE ADULT - ASSESSMENT
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     Plan:     LEFT hip replacement with Abx spacer: Pain meds, DVT prophylaxis, Perioperative antibiotics as per Primary team, PT eval, meds to prevent constipation, Incentive spirometry, will continue IV fluids for now.      DVT of right upper extremity: Continue with rivaroxaban 10 mg every 24 hours.     HTN: Will continue with amlodipine 5 mg once a day with holding parameters.    DVT prophylaxis as Ortho Service. 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     Plan:     LEFT hip replacement with Abx spacer: Pain meds, DVT prophylaxis, Perioperative antibiotics as per Primary team, PT eval, meds to prevent constipation, Incentive spirometry, will continue IV fluids for now.      DVT of right upper extremity: Continue with rivaroxaban 10 mg every 24 hours.     HTN: Will continue with amlodipine 5 mg once a day with holding parameters.    Anemia acute on chronic: Will continue with iron supplement, will monitor CBC     DVT prophylaxis as Ortho Service. 74 y/o female with Hx Breast cancer right breast, DVT of right upper extremity (deep vein thrombosis), Essential hypertension, Heart murmur, Lymphedema, Risk factors for obstructive sleep apnea, insertion of antibiotics spacer secondary to arthritis due to bacteria, unspecified knee, Pt recently treated for bacteremia, pt dx with metastatic right breast cancer, diagnosed 2015, Treated with Radiation and chemotherapy, states multiple picc line insertion due to infection, she came in for LEFT hip replacement with Abx spacer post op day 01.     Plan:     LEFT hip replacement with Abx spacer: Pain meds, DVT prophylaxis, Perioperative antibiotics as per Primary team, PT eval, meds to prevent constipation, Incentive spirometry, will continue IV fluids for now.      DVT of right upper extremity: Continue with rivaroxaban 10 mg every 24 hours.     HTN: Will continue with amlodipine 5 mg once a day with holding parameters.    chronic Anemia with acute post operative blood loss: Will continue with iron supplement, will monitor CBC     DVT prophylaxis as Ortho Service.

## 2019-01-12 LAB
HCT VFR BLD CALC: 27.7 % — LOW (ref 37–47)
HGB BLD-MCNC: 8.7 G/DL — LOW (ref 12–16)
MCHC RBC-ENTMCNC: 26.9 PG — LOW (ref 27–31)
MCHC RBC-ENTMCNC: 31.4 G/DL — LOW (ref 32–36)
MCV RBC AUTO: 85.5 FL — SIGNIFICANT CHANGE UP (ref 81–99)
PLATELET # BLD AUTO: 180 K/UL — SIGNIFICANT CHANGE UP (ref 150–400)
RBC # BLD: 3.24 M/UL — LOW (ref 4.4–5.2)
RBC # FLD: 17.2 % — HIGH (ref 11–15.6)
WBC # BLD: 4 K/UL — LOW (ref 4.8–10.8)
WBC # FLD AUTO: 4 K/UL — LOW (ref 4.8–10.8)

## 2019-01-12 PROCEDURE — 99233 SBSQ HOSP IP/OBS HIGH 50: CPT

## 2019-01-12 PROCEDURE — 99232 SBSQ HOSP IP/OBS MODERATE 35: CPT

## 2019-01-12 RX ADMIN — Medication 15 MILLIGRAM(S): at 06:07

## 2019-01-12 RX ADMIN — OXYCODONE HYDROCHLORIDE 10 MILLIGRAM(S): 5 TABLET ORAL at 17:18

## 2019-01-12 RX ADMIN — Medication 975 MILLIGRAM(S): at 21:17

## 2019-01-12 RX ADMIN — Medication 2000 UNIT(S): at 12:18

## 2019-01-12 RX ADMIN — Medication 15 MILLIGRAM(S): at 17:18

## 2019-01-12 RX ADMIN — Medication 975 MILLIGRAM(S): at 14:37

## 2019-01-12 RX ADMIN — Medication 975 MILLIGRAM(S): at 06:06

## 2019-01-12 RX ADMIN — Medication 100 MILLIGRAM(S): at 12:18

## 2019-01-12 RX ADMIN — Medication 15 MILLIGRAM(S): at 23:12

## 2019-01-12 RX ADMIN — Medication 15 MILLIGRAM(S): at 12:30

## 2019-01-12 RX ADMIN — Medication 15 MILLIGRAM(S): at 12:18

## 2019-01-12 RX ADMIN — Medication 100 MILLIGRAM(S): at 21:14

## 2019-01-12 RX ADMIN — RIVAROXABAN 10 MILLIGRAM(S): KIT at 12:19

## 2019-01-12 RX ADMIN — Medication 975 MILLIGRAM(S): at 15:30

## 2019-01-12 RX ADMIN — OXYCODONE HYDROCHLORIDE 10 MILLIGRAM(S): 5 TABLET ORAL at 14:54

## 2019-01-12 RX ADMIN — SODIUM CHLORIDE 3 MILLILITER(S): 9 INJECTION INTRAMUSCULAR; INTRAVENOUS; SUBCUTANEOUS at 06:11

## 2019-01-12 RX ADMIN — OXYCODONE HYDROCHLORIDE 10 MILLIGRAM(S): 5 TABLET ORAL at 06:12

## 2019-01-12 RX ADMIN — SODIUM CHLORIDE 3 MILLILITER(S): 9 INJECTION INTRAMUSCULAR; INTRAVENOUS; SUBCUTANEOUS at 14:42

## 2019-01-12 RX ADMIN — OXYCODONE HYDROCHLORIDE 10 MILLIGRAM(S): 5 TABLET ORAL at 06:07

## 2019-01-12 RX ADMIN — CEFEPIME 100 MILLIGRAM(S): 1 INJECTION, POWDER, FOR SOLUTION INTRAMUSCULAR; INTRAVENOUS at 17:18

## 2019-01-12 RX ADMIN — OXYCODONE HYDROCHLORIDE 10 MILLIGRAM(S): 5 TABLET ORAL at 15:30

## 2019-01-12 RX ADMIN — Medication 15 MILLIGRAM(S): at 06:11

## 2019-01-12 RX ADMIN — Medication 100 MILLIGRAM(S): at 14:37

## 2019-01-12 RX ADMIN — Medication 100 MILLIGRAM(S): at 06:07

## 2019-01-12 RX ADMIN — SCOPALAMINE 1 PATCH: 1 PATCH, EXTENDED RELEASE TRANSDERMAL at 14:41

## 2019-01-12 RX ADMIN — PREGABALIN 1000 MICROGRAM(S): 225 CAPSULE ORAL at 12:18

## 2019-01-12 RX ADMIN — Medication 15 MILLIGRAM(S): at 17:30

## 2019-01-12 RX ADMIN — Medication 975 MILLIGRAM(S): at 06:11

## 2019-01-12 RX ADMIN — Medication 15 MILLIGRAM(S): at 23:11

## 2019-01-12 RX ADMIN — SODIUM CHLORIDE 3 MILLILITER(S): 9 INJECTION INTRAMUSCULAR; INTRAVENOUS; SUBCUTANEOUS at 21:12

## 2019-01-12 RX ADMIN — CEFEPIME 100 MILLIGRAM(S): 1 INJECTION, POWDER, FOR SOLUTION INTRAMUSCULAR; INTRAVENOUS at 06:06

## 2019-01-12 RX ADMIN — OXYCODONE HYDROCHLORIDE 10 MILLIGRAM(S): 5 TABLET ORAL at 18:00

## 2019-01-12 RX ADMIN — Medication 975 MILLIGRAM(S): at 21:14

## 2019-01-12 NOTE — PROGRESS NOTE ADULT - SUBJECTIVE AND OBJECTIVE BOX
DOMINIK PARKS    [id number]    History: Patient is status post left  hip irrigation and debridement with placement od antibiotic spacer, POD#3.  . Patient is doing well and is comfortable. The patient's pain is controlled using the prescribed pain medications. The patient is participating in physical therapy, PWB LLE. Denies nausea, vomiting, chest pain, shortness of breath, abdominal pain or fever. No new complaints.                              8.7    4.0   )-----------( 180      ( 12 Jan 2019 06:46 )             27.7     01-11    139  |  106  |  13.0  ----------------------------<  113  4.3   |  25.0  |  0.58    Ca    9.0      11 Jan 2019 07:59        MEDICATIONS  (STANDING):  acetaminophen   Tablet .. 975 milliGRAM(s) Oral every 8 hours  amLODIPine   Tablet 5 milliGRAM(s) Oral daily  cefepime   IVPB      cefepime   IVPB 2000 milliGRAM(s) IV Intermittent every 12 hours  cholecalciferol 2000 Unit(s) Oral daily  cyanocobalamin 1000 MICROGram(s) Oral daily  docusate sodium 100 milliGRAM(s) Oral three times a day  ketorolac   Injectable 15 milliGRAM(s) IV Push every 6 hours  lactated ringers. 1000 milliLiter(s) (100 mL/Hr) IV Continuous <Continuous>  oxyCODONE  ER Tablet 10 milliGRAM(s) Oral every 12 hours  pyridoxine 100 milliGRAM(s) Oral daily  rivaroxaban 10 milliGRAM(s) Oral daily  sodium chloride 0.9% lock flush 3 milliLiter(s) IV Push every 8 hours    MEDICATIONS  (PRN):  aluminum hydroxide/magnesium hydroxide/simethicone Suspension 30 milliLiter(s) Oral four times a day PRN Indigestion  HYDROmorphone   Tablet 2 milliGRAM(s) Oral every 3 hours PRN Severe Pain (7 - 10)  HYDROmorphone  Injectable 0.5 milliGRAM(s) IV Push every 4 hours PRN Severe Pain (7 - 10)  ondansetron Injectable 4 milliGRAM(s) IV Push every 6 hours PRN Nausea and/or Vomiting  oxyCODONE    IR 5 milliGRAM(s) Oral every 3 hours PRN Mild Pain (1 - 3)  oxyCODONE    IR 10 milliGRAM(s) Oral every 3 hours PRN Moderate Pain (4 - 6)  senna 2 Tablet(s) Oral at bedtime PRN Constipation      Physical exam: The left hip dressing is clean, dry and intact. No drainage or discharge. No erythema is noted. No blistering. No ecchymosis. The calf is supple nontender. Passive range of motion is acceptable to due postoperative pain. No calf tenderness. Sensation to light touch is grossly intact distally. The lateral cutaneous nerve is intact. Motor function distally is 5/5. No foot drop. 2+ dorsalis pedis pulse. Capillary refill is less than 2 seconds. No cyanosis.    Primary Orthopedic Assessment:  • s/p LEFT hip irrigation and debridement with antibiotic spacer    Secondary  Orthopedic Assessment(s):   •     Secondary  Medical Assessment(s):   •     Plan:   • DVT prophylaxis as prescribed, including use of compression devices and ankle pumps  •  Continue physical therapy, PWB LLE  • • Incentive spirometry encouraged  • Pain control as clinically indicated  •• Discharge planning – anticipated discharge is subacute rehabilitation

## 2019-01-12 NOTE — PROGRESS NOTE ADULT - SUBJECTIVE AND OBJECTIVE BOX
Patient seen and examined ,  status post left  hip irrigation and debridement with placement od antibiotic spacer, POD#3. Pain well controlled , no other complaints     CC : L hip pain  well controlled     HPI:  72 y/o female seen today for pre-op left hip total joint replacement, insertion of antibiotics spacer secondary to arthritis due to bacteria, unspecified knee. Pt recently treated for bacteremia, pt dx with metastatic right breast cancer, diagnosed 2015, Treated with Radiation  and chemotherapy, states multiple piccline  insertion due to infection. Received today on her w/c, accompanied by her spouse (07 Jan 2019 15:28)      PAST MEDICAL & SURGICAL HISTORY:  Risk factors for obstructive sleep apnea  Bacteremia  Heart murmur  Essential hypertension  Lymphedema  DVT of upper extremity (deep vein thrombosis): RIGHT  Breast cancer: right breast  S/P thoracentesis: 3/2016  S/P biopsy: R breast  PICC (peripherally inserted central catheter) in place: Left chest wall catheter Inserted October, 2018      MEDICATIONS  (STANDING):  acetaminophen   Tablet .. 975 milliGRAM(s) Oral every 8 hours  amLODIPine   Tablet 5 milliGRAM(s) Oral daily  cefepime   IVPB      cefepime   IVPB 2000 milliGRAM(s) IV Intermittent every 12 hours  cholecalciferol 2000 Unit(s) Oral daily  cyanocobalamin 1000 MICROGram(s) Oral daily  docusate sodium 100 milliGRAM(s) Oral three times a day  ketorolac   Injectable 15 milliGRAM(s) IV Push every 6 hours  lactated ringers. 1000 milliLiter(s) (100 mL/Hr) IV Continuous <Continuous>  oxyCODONE  ER Tablet 10 milliGRAM(s) Oral every 12 hours  pyridoxine 100 milliGRAM(s) Oral daily  rivaroxaban 10 milliGRAM(s) Oral daily  sodium chloride 0.9% lock flush 3 milliLiter(s) IV Push every 8 hours    MEDICATIONS  (PRN):  aluminum hydroxide/magnesium hydroxide/simethicone Suspension 30 milliLiter(s) Oral four times a day PRN Indigestion  HYDROmorphone   Tablet 2 milliGRAM(s) Oral every 3 hours PRN Severe Pain (7 - 10)  HYDROmorphone  Injectable 0.5 milliGRAM(s) IV Push every 4 hours PRN Severe Pain (7 - 10)  ondansetron Injectable 4 milliGRAM(s) IV Push every 6 hours PRN Nausea and/or Vomiting  oxyCODONE    IR 5 milliGRAM(s) Oral every 3 hours PRN Mild Pain (1 - 3)  oxyCODONE    IR 10 milliGRAM(s) Oral every 3 hours PRN Moderate Pain (4 - 6)  senna 2 Tablet(s) Oral at bedtime PRN Constipation      LABS:                          8.7    4.0   )-----------( 180      ( 12 Jan 2019 06:46 )             27.7     01-11    139  |  106  |  13.0  ----------------------------<  113  4.3   |  25.0  |  0.58    Ca    9.0      11 Jan 2019 07:59    Culture - Surgical Swab (01.09.19 @ 21:28)    Gram Stain:   No WBC's seen.  No organisms seen    Specimen Source: .Surgical Swab left hip synovium (swabs)    Culture Results:   No growth at 2 days.  Culture in progress    Culture - Surgical Swab (01.09.19 @ 21:28)    Gram Stain:   Moderate White blood cells  No organisms seen    Specimen Source: .Surgical Swab left hip synovial fluid (swabs)    Culture Results:   No growth at 2 days.  Culture in progress    Culture - Surgical Swab (01.09.19 @ 21:28)    Gram Stain:   No WBC's seen.  No organisms seen    Specimen Source: .Surgical Swab left  hip acetabular (swabs)    Culture Results:   No growth at 2 days.  Culture in progress          RADIOLOGY & ADDITIONAL TESTS:  < from: Xray Chest 1 View- PORTABLE-Routine (01.10.19 @ 11:36) >     EXAM:  XR CHEST PORTABLE ROUTINE 1V                          PROCEDURE DATE:  01/10/2019          INTERPRETATION:  Portable chest radiograph        CLINICAL INFORMATION:       PICC line placement    TECHNIQUE:  Portable  AP view of the chest was obtained.    FINDINGS:   No previous examinations are available for review.    LEFT PICC line catheter tip in brachiocephalic vein SVC junction..    The lungs  are clear gross airspace consolidations. There are small   bilateral effusions.         The heart and mediastinum are within normal limits.       < from: Xray Hip w/ Pelvis 1 View, Left (01.10.19 @ 01:05) >     EXAM:  XR HIP WITH PELV 1V LT                          PROCEDURE DATE:  01/10/2019          INTERPRETATION:  HISTORY: Hemiarthroplasty    Two views of the left hip are submitted.    Evaluation demonstrates both femoral and acetabular components   well-seated and in good anatomic alignment. There is no evidence of   fracture. The visualized pelvis appears within normal limits.     Impression:  Hip replacement as described above.      CANDIDO REYEZ M.D., ATTENDING RADIOLOGIST  This document has been electronically signed. Iker 10 2019  6:05AM    < end of copied text >    Visualized osseous structures are intact.        IMPRESSION:     Small bilateral effusions..       PICC line catheter tip in brachycephalic vein SVC junction.    < end of copied text >    < from: Xray Arthrogram Hip, Left (12.26.18 @ 16:48) >    EXAM:  XR ARTHROGRAM HIP WO ANES LT+        PROCEDURE DATE:  12/26/2018           INTERPRETATION:  LEFT HIP ASPIRATION    CLINICAL INDICATION: LEFT HIP Pain.  Concern for septic joint.    TECHNIQUE: Aspiration of the left hip was performed using Fluoroscopy.    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    After written informed consent was obtained from the patient, the   patient's LEFT HIP was prepped and draped using sterile technique. 1%   lidocaine was administered for local anesthesia.  Using Fluoroscopic   guidance a 22 gauge 3.5" needle was inserted into the patient's left hip   joint.  Immediate aspiration yielded 3 cc of clear, serosanguineous fluid.    Needle tip position was confirmed with intra-articular injection of   contrast.  2 cc of Omnipaque 240 (18 cc discarded) was injected into the   joint.    Post procedure images demonstrated intra-articular contrast.    Total fluoroscopy time: 0.3 minutes.    The needle was removed, hemostasis was achieved, and a small Band-Aid was   placed on the skin.    The patient tolerated the procedure well without post procedure   complication.    IMPRESSION:  1.  Fluoroscopy guided LEFT HIP aspiration without immediate complication.  2.  Aspiration yielded 3 cc of clear, serosanguineous fluid, sent to the   laboratory for cell count, Gram stain, and culture.                    VIVIENNE LIVINGSTON M.D., ATTENDING RADIOLOGIST   This document has been electronically signed. Dec 26 2018  5:15PM    < end of copied text >          REVIEW OF SYSTEMS:    CONSTITUTIONAL: No fever, weight loss, or fatigue  EYES: No eye pain, visual disturbances, or discharge  ENMT:  No difficulty hearing, tinnitus, vertigo; No sinus or throat pain  NECK: No pain or stiffness  RESPIRATORY: No cough, wheezing, chills or hemoptysis; No shortness of breath  CARDIOVASCULAR: No chest pain, palpitations, dizziness, or leg swelling  GASTROINTESTINAL: No abdominal or epigastric pain. No nausea, vomiting, or hematemesis; No diarrhea or constipation. No melena or hematochezia.  GENITOURINARY: No dysuria, frequency, hematuria, or incontinence  NEUROLOGICAL: No headaches, memory loss, loss of strength, numbness, or tremors  SKIN: No itching, burning, rashes, or lesions   LYMPH NODES: No enlarged glands  ENDOCRINE: No heat or cold intolerance; No hair loss  MUSCULOSKELETAL:   PSYCHIATRIC: No depression, anxiety, mood swings, or difficulty sleeping  HEME/LYMPH: No easy bruising, or bleeding gums  ALLERGY AND IMMUNOLOGIC: No hives or eczema    Vital Signs Last 24 Hrs  T(C): 36.5 (12 Jan 2019 12:13), Max: 37.2 (11 Jan 2019 23:35)  T(F): 97.7 (12 Jan 2019 12:13), Max: 98.9 (11 Jan 2019 23:35)  HR: 86 (12 Jan 2019 12:13) (70 - 86)  BP: 123/57 (12 Jan 2019 12:13) (98/56 - 123/57)  BP(mean): --  RR: 18 (12 Jan 2019 12:13) (17 - 18)  SpO2: 98% (12 Jan 2019 12:13) (97% - 99%)  PHYSICAL EXAM:    GENERAL: NAD, well-groomed, well-developed  HEAD:  Atraumatic, Normocephalic  EYES: EOMI, PERRLA, conjunctiva and sclera clear  NECK: Supple, No JVD, Normal thyroid  NERVOUS SYSTEM:  Alert & Oriented X3, no focal deficit  CHEST/LUNG: CTA b/l ,  no  rales, rhonchi, wheezing, or rubs  HEART: Regular rate and rhythm; No murmurs, rubs, or gallops  ABDOMEN: Soft, Nontender, Nondistended; Bowel sounds present  EXTREMITIES:  2+ Peripheral Pulses, No clubbing, cyanosis, or edema  LYMPH: No lymphadenopathy noted  SKIN: No rashes or lesions

## 2019-01-13 PROCEDURE — 99232 SBSQ HOSP IP/OBS MODERATE 35: CPT

## 2019-01-13 RX ADMIN — Medication 2000 UNIT(S): at 11:35

## 2019-01-13 RX ADMIN — Medication 15 MILLIGRAM(S): at 17:15

## 2019-01-13 RX ADMIN — OXYCODONE HYDROCHLORIDE 10 MILLIGRAM(S): 5 TABLET ORAL at 05:52

## 2019-01-13 RX ADMIN — SODIUM CHLORIDE 3 MILLILITER(S): 9 INJECTION INTRAMUSCULAR; INTRAVENOUS; SUBCUTANEOUS at 05:52

## 2019-01-13 RX ADMIN — CEFEPIME 100 MILLIGRAM(S): 1 INJECTION, POWDER, FOR SOLUTION INTRAMUSCULAR; INTRAVENOUS at 17:04

## 2019-01-13 RX ADMIN — Medication 15 MILLIGRAM(S): at 05:47

## 2019-01-13 RX ADMIN — Medication 975 MILLIGRAM(S): at 05:52

## 2019-01-13 RX ADMIN — Medication 15 MILLIGRAM(S): at 17:04

## 2019-01-13 RX ADMIN — SODIUM CHLORIDE 3 MILLILITER(S): 9 INJECTION INTRAMUSCULAR; INTRAVENOUS; SUBCUTANEOUS at 21:12

## 2019-01-13 RX ADMIN — Medication 15 MILLIGRAM(S): at 11:36

## 2019-01-13 RX ADMIN — Medication 975 MILLIGRAM(S): at 13:56

## 2019-01-13 RX ADMIN — OXYCODONE HYDROCHLORIDE 10 MILLIGRAM(S): 5 TABLET ORAL at 06:48

## 2019-01-13 RX ADMIN — AMLODIPINE BESYLATE 5 MILLIGRAM(S): 2.5 TABLET ORAL at 05:47

## 2019-01-13 RX ADMIN — SODIUM CHLORIDE 3 MILLILITER(S): 9 INJECTION INTRAMUSCULAR; INTRAVENOUS; SUBCUTANEOUS at 15:21

## 2019-01-13 RX ADMIN — RIVAROXABAN 10 MILLIGRAM(S): KIT at 11:36

## 2019-01-13 RX ADMIN — Medication 975 MILLIGRAM(S): at 14:50

## 2019-01-13 RX ADMIN — CEFEPIME 100 MILLIGRAM(S): 1 INJECTION, POWDER, FOR SOLUTION INTRAMUSCULAR; INTRAVENOUS at 05:48

## 2019-01-13 RX ADMIN — OXYCODONE HYDROCHLORIDE 10 MILLIGRAM(S): 5 TABLET ORAL at 18:00

## 2019-01-13 RX ADMIN — Medication 975 MILLIGRAM(S): at 22:16

## 2019-01-13 RX ADMIN — Medication 100 MILLIGRAM(S): at 11:37

## 2019-01-13 RX ADMIN — Medication 100 MILLIGRAM(S): at 13:56

## 2019-01-13 RX ADMIN — Medication 975 MILLIGRAM(S): at 05:47

## 2019-01-13 RX ADMIN — Medication 100 MILLIGRAM(S): at 21:15

## 2019-01-13 RX ADMIN — Medication 975 MILLIGRAM(S): at 21:16

## 2019-01-13 RX ADMIN — PREGABALIN 1000 MICROGRAM(S): 225 CAPSULE ORAL at 11:37

## 2019-01-13 RX ADMIN — OXYCODONE HYDROCHLORIDE 10 MILLIGRAM(S): 5 TABLET ORAL at 17:04

## 2019-01-13 RX ADMIN — OXYCODONE HYDROCHLORIDE 10 MILLIGRAM(S): 5 TABLET ORAL at 05:48

## 2019-01-13 RX ADMIN — Medication 100 MILLIGRAM(S): at 05:47

## 2019-01-13 RX ADMIN — Medication 15 MILLIGRAM(S): at 05:52

## 2019-01-13 RX ADMIN — Medication 15 MILLIGRAM(S): at 11:50

## 2019-01-13 RX ADMIN — SENNA PLUS 2 TABLET(S): 8.6 TABLET ORAL at 21:15

## 2019-01-13 NOTE — PROGRESS NOTE ADULT - ASSESSMENT
74 y/o female with Hx Breast cancer ( right breast ) ,  DVT of right upper extremity (deep vein thrombosis), Essential hypertension, Heart murmur, Lymphedema, Risk factors for obstructive sleep apnea, insertion of antibiotics spacer secondary to arthritis due to bacteria, unspecified knee, Pt recently treated for bacteremia, pt dx with metastatic right breast cancer, diagnosed 2015, Treated with Radiation and chemotherapy, states multiple picc line insertion due to infection, she came in for LEFT hip replacement with Abx spacer .     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     HTN: Will continue with amlodipine 5 mg once a day with holding parameters.    chronic Anemia with acute post operative blood loss: Will continue with iron supplement, will monitor CBC     DVT prophylaxis as Ortho Service - on Xarelto     Hx of R breast cancer - treated with radiation / chemo , follow up with Oncologist as on outpatient . 72 y/o female with Hx Breast cancer ( right breast ) ,  DVT of right upper extremity (deep vein thrombosis), Essential hypertension, Heart murmur, Lymphedema, Risk factors for obstructive sleep apnea .  Pt recently treated for bacteremia, pt dx with metastatic right breast cancer, diagnosed 2015, Treated with Radiation and chemotherapy, states multiple picc line insertion due to infection in the past . 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    chronic Anemia with acute post operative blood loss: s/p 2 units PRBC perioperatively , patient states she was getting iv Iron in the past , will check anemia w/u     DVT prophylaxis as Ortho Service - on Xarelto     Hx of R breast cancer - treated with radiation / chemo , follow up with Oncologist as on outpatient . 74 y/o female with Hx Breast cancer ( right breast ) ,  DVT of right upper extremity (deep vein thrombosis), Essential hypertension, Heart murmur, Lymphedema, Risk factors for obstructive sleep apnea .  Pt recently treated for bacteremia, pt dx with metastatic right breast cancer, diagnosed 2015, Treated with Radiation and chemotherapy, states multiple picc line insertion due to infection in the past . 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    chronic Anemia with acute post operative blood loss: s/p 2 units PRBC perioperatively , patient states she was getting iv Iron in the past , will check anemia w/u     DVT prophylaxis as Ortho Service - on Xarelto     Hx of R breast cancer - treated with radiation / chemo , follow up with Oncologist as on outpatient and continue treatment as per Oncologist     Hx of chronic urinary frequency - will get UA

## 2019-01-13 NOTE — PROGRESS NOTE ADULT - SUBJECTIVE AND OBJECTIVE BOX
Patient seen and examined .  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 Pt recently treated for bacteremia, pt dx with metastatic right breast cancer, diagnosed 2015, Treated with Radiation  and chemotherapy, states multiple piccline  insertion due to infection. Received today on her w/c, accompanied by her spouse (07 Jan 2019 15:28)      PAST MEDICAL & SURGICAL HISTORY:  Risk factors for obstructive sleep apnea  Bacteremia  Heart murmur  Essential hypertension  Lymphedema  DVT of upper extremity (deep vein thrombosis): RIGHT  Breast cancer: right breast  S/P thoracentesis: 3/2016  S/P biopsy: R breast  PICC (peripherally inserted central catheter) in place: Left chest wall catheter Inserted October, 2018      MEDICATIONS  (STANDING):  acetaminophen   Tablet .. 975 milliGRAM(s) Oral every 8 hours  amLODIPine   Tablet 5 milliGRAM(s) Oral daily  cefepime   IVPB      cefepime   IVPB 2000 milliGRAM(s) IV Intermittent every 12 hours  cholecalciferol 2000 Unit(s) Oral daily  cyanocobalamin 1000 MICROGram(s) Oral daily  docusate sodium 100 milliGRAM(s) Oral three times a day  ketorolac   Injectable 15 milliGRAM(s) IV Push every 6 hours  lactated ringers. 1000 milliLiter(s) (100 mL/Hr) IV Continuous <Continuous>  oxyCODONE  ER Tablet 10 milliGRAM(s) Oral every 12 hours  pyridoxine 100 milliGRAM(s) Oral daily  rivaroxaban 10 milliGRAM(s) Oral daily  sodium chloride 0.9% lock flush 3 milliLiter(s) IV Push every 8 hours    MEDICATIONS  (PRN):  aluminum hydroxide/magnesium hydroxide/simethicone Suspension 30 milliLiter(s) Oral four times a day PRN Indigestion  HYDROmorphone   Tablet 2 milliGRAM(s) Oral every 3 hours PRN Severe Pain (7 - 10)  HYDROmorphone  Injectable 0.5 milliGRAM(s) IV Push every 4 hours PRN Severe Pain (7 - 10)  ondansetron Injectable 4 milliGRAM(s) IV Push every 6 hours PRN Nausea and/or Vomiting  oxyCODONE    IR 5 milliGRAM(s) Oral every 3 hours PRN Mild Pain (1 - 3)  oxyCODONE    IR 10 milliGRAM(s) Oral every 3 hours PRN Moderate Pain (4 - 6)  senna 2 Tablet(s) Oral at bedtime PRN Constipation      LABS:                          8.7    4.0   )-----------( 180      ( 12 Jan 2019 06:46 )             27.7       Culture - Surgical Swab (01.09.19 @ 21:28)    Gram Stain:   No WBC's seen.  No organisms seen    Specimen Source: .Surgical Swab left hip synovium (swabs)    Culture Results:   Culture in progress    Culture - Surgical Swab (01.09.19 @ 21:28)    Gram Stain:   Moderate White blood cells  No organisms seen    Specimen Source: .Surgical Swab left hip synovial fluid (swabs)    Culture Results:   No growth at 3 days.  Culture in progress    Culture - Surgical Swab (01.09.19 @ 21:28)    Gram Stain:   No WBC's seen.  No organisms seen    Specimen Source: .Surgical Swab left  hip acetabular (swabs)    Culture Results:   No growth at 3 days.  Culture in progress          REVIEW OF SYSTEMS:    CONSTITUTIONAL: No fever, weight loss, or fatigue  EYES: No eye pain, visual disturbances, or discharge  ENMT:  No difficulty hearing, tinnitus, vertigo; No sinus or throat pain  NECK: No pain or stiffness  RESPIRATORY: No cough, wheezing, chills or hemoptysis; No shortness of breath  CARDIOVASCULAR: No chest pain, palpitations, dizziness, or leg swelling  GASTROINTESTINAL: No abdominal or epigastric pain. No nausea, vomiting, or hematemesis; No diarrhea or constipation. No melena or hematochezia.  GENITOURINARY: No dysuria, frequency, hematuria, or incontinence  NEUROLOGICAL: No headaches, memory loss, loss of strength, numbness, or tremors  SKIN: No itching, burning, rashes, or lesions   LYMPH NODES: No enlarged glands  ENDOCRINE: No heat or cold intolerance; No hair loss  MUSCULOSKELETAL:   PSYCHIATRIC: No depression, anxiety, mood swings, or difficulty sleeping  HEME/LYMPH: No easy bruising, or bleeding gums  ALLERGY AND IMMUNOLOGIC: No hives or eczema    Vital Signs Last 24 Hrs  T(C): 37.1 (13 Jan 2019 08:32), Max: 37.1 (13 Jan 2019 08:32)  T(F): 98.7 (13 Jan 2019 08:32), Max: 98.7 (13 Jan 2019 08:32)  HR: 75 (13 Jan 2019 08:32) (69 - 87)  BP: 112/61 (13 Jan 2019 08:32) (112/61 - 127/67)  BP(mean): --  RR: 18 (13 Jan 2019 08:32) (17 - 18)  SpO2: 98% (13 Jan 2019 08:32) (98% - 100%)  PHYSICAL EXAM:    GENERAL: NAD, well-groomed, well-developed  HEAD:  Atraumatic, Normocephalic  EYES: EOMI, PERRLA, conjunctiva and sclera clear  NECK: Supple, No JVD, Normal thyroid  NERVOUS SYSTEM:  Alert & Oriented X3, no focal deficit  CHEST/LUNG: CTA b/l ,  no  rales, rhonchi, wheezing, or rubs  HEART: Regular rate and rhythm; No murmurs, rubs, or gallops  ABDOMEN: Soft, Nontender, Nondistended; Bowel sounds present  EXTREMITIES:  2+ Peripheral Pulses, No clubbing, cyanosis, or edema  LYMPH: No lymphadenopathy noted  SKIN: No rashes or lesions Patient seen and examined .  • s/p LEFT posterior hip replacement with Abx spacer POD#1    CC : L hip pain well controlled       PAST MEDICAL & SURGICAL HISTORY:  Risk factors for obstructive sleep apnea  Bacteremia  Heart murmur  Essential hypertension  Lymphedema  DVT of upper extremity (deep vein thrombosis): RIGHT  Breast cancer: right breast  S/P thoracentesis: 3/2016  S/P biopsy: R breast  PICC (peripherally inserted central catheter) in place: Left chest wall catheter Inserted October, 2018      MEDICATIONS  (STANDING):  acetaminophen   Tablet .. 975 milliGRAM(s) Oral every 8 hours  amLODIPine   Tablet 5 milliGRAM(s) Oral daily  cefepime   IVPB      cefepime   IVPB 2000 milliGRAM(s) IV Intermittent every 12 hours  cholecalciferol 2000 Unit(s) Oral daily  cyanocobalamin 1000 MICROGram(s) Oral daily  docusate sodium 100 milliGRAM(s) Oral three times a day  ketorolac   Injectable 15 milliGRAM(s) IV Push every 6 hours  lactated ringers. 1000 milliLiter(s) (100 mL/Hr) IV Continuous <Continuous>  oxyCODONE  ER Tablet 10 milliGRAM(s) Oral every 12 hours  pyridoxine 100 milliGRAM(s) Oral daily  rivaroxaban 10 milliGRAM(s) Oral daily  sodium chloride 0.9% lock flush 3 milliLiter(s) IV Push every 8 hours    MEDICATIONS  (PRN):  aluminum hydroxide/magnesium hydroxide/simethicone Suspension 30 milliLiter(s) Oral four times a day PRN Indigestion  HYDROmorphone   Tablet 2 milliGRAM(s) Oral every 3 hours PRN Severe Pain (7 - 10)  HYDROmorphone  Injectable 0.5 milliGRAM(s) IV Push every 4 hours PRN Severe Pain (7 - 10)  ondansetron Injectable 4 milliGRAM(s) IV Push every 6 hours PRN Nausea and/or Vomiting  oxyCODONE    IR 5 milliGRAM(s) Oral every 3 hours PRN Mild Pain (1 - 3)  oxyCODONE    IR 10 milliGRAM(s) Oral every 3 hours PRN Moderate Pain (4 - 6)  senna 2 Tablet(s) Oral at bedtime PRN Constipation      LABS:                          8.7    4.0   )-----------( 180      ( 12 Jan 2019 06:46 )             27.7       Culture - Surgical Swab (01.09.19 @ 21:28)    Gram Stain:   No WBC's seen.  No organisms seen    Specimen Source: .Surgical Swab left hip synovium (swabs)    Culture Results:   Culture in progress    Culture - Surgical Swab (01.09.19 @ 21:28)    Gram Stain:   Moderate White blood cells  No organisms seen    Specimen Source: .Surgical Swab left hip synovial fluid (swabs)    Culture Results:   No growth at 3 days.  Culture in progress    Culture - Surgical Swab (01.09.19 @ 21:28)    Gram Stain:   No WBC's seen.  No organisms seen    Specimen Source: .Surgical Swab left  hip acetabular (swabs)    Culture Results:   No growth at 3 days.  Culture in progress          REVIEW OF SYSTEMS:    CONSTITUTIONAL: No fever, weight loss, or fatigue  EYES: No eye pain, visual disturbances, or discharge  ENMT:  No difficulty hearing, tinnitus, vertigo; No sinus or throat pain  NECK: No pain or stiffness  RESPIRATORY: No cough, wheezing, chills or hemoptysis; No shortness of breath  CARDIOVASCULAR: No chest pain, palpitations, dizziness, or leg swelling  GASTROINTESTINAL: No abdominal or epigastric pain. No nausea, vomiting, or hematemesis; No diarrhea or constipation. No melena or hematochezia.  GENITOURINARY: No dysuria, frequency, hematuria, or incontinence  NEUROLOGICAL: No headaches, memory loss, loss of strength, numbness, or tremors  SKIN: No itching, burning, rashes, or lesions   LYMPH NODES: No enlarged glands  ENDOCRINE: No heat or cold intolerance; No hair loss  MUSCULOSKELETAL:   PSYCHIATRIC: No depression, anxiety, mood swings, or difficulty sleeping  HEME/LYMPH: No easy bruising, or bleeding gums  ALLERGY AND IMMUNOLOGIC: No hives or eczema    Vital Signs Last 24 Hrs  T(C): 37.1 (13 Jan 2019 08:32), Max: 37.1 (13 Jan 2019 08:32)  T(F): 98.7 (13 Jan 2019 08:32), Max: 98.7 (13 Jan 2019 08:32)  HR: 75 (13 Jan 2019 08:32) (69 - 87)  BP: 112/61 (13 Jan 2019 08:32) (112/61 - 127/67)  BP(mean): --  RR: 18 (13 Jan 2019 08:32) (17 - 18)  SpO2: 98% (13 Jan 2019 08:32) (98% - 100%)  PHYSICAL EXAM:    GENERAL: NAD, well-groomed, well-developed  HEAD:  Atraumatic, Normocephalic  EYES: EOMI, PERRLA, conjunctiva and sclera clear  NECK: Supple, No JVD, Normal thyroid  NERVOUS SYSTEM:  Alert & Oriented X3, no focal deficit  CHEST/LUNG: CTA b/l ,  no  rales, rhonchi, wheezing, or rubs  HEART: Regular rate and rhythm; No murmurs, rubs, or gallops  ABDOMEN: Soft, Nontender, Nondistended; Bowel sounds present  EXTREMITIES:  2+ Peripheral Pulses, No clubbing, cyanosis, or edema  LYMPH: No lymphadenopathy noted  SKIN: No rashes or lesions Patient seen and examined .  • s/p LEFT posterior hip replacement with Abx spacer POD# 4 . Hip pain well controlled , c/o L heel pain when step on her L foot x 2 days , participating with physical therapy , no n/v , had BM yesterday , c/o chronic urinary frequency     CC : L hip pain well controlled , L heel pain while stepping on it       PAST MEDICAL & SURGICAL HISTORY:  Risk factors for obstructive sleep apnea  Bacteremia  Heart murmur  Essential hypertension  Lymphedema  DVT of upper extremity (deep vein thrombosis): RIGHT( 2015)   Breast cancer: right breast  S/P thoracentesis: 3/2016  S/P biopsy: R breast  PICC (peripherally inserted central catheter) in place: Left chest wall catheter Inserted October, 2018      MEDICATIONS  (STANDING):  acetaminophen   Tablet .. 975 milliGRAM(s) Oral every 8 hours  amLODIPine   Tablet 5 milliGRAM(s) Oral daily  cefepime   IVPB      cefepime   IVPB 2000 milliGRAM(s) IV Intermittent every 12 hours  cholecalciferol 2000 Unit(s) Oral daily  cyanocobalamin 1000 MICROGram(s) Oral daily  docusate sodium 100 milliGRAM(s) Oral three times a day  ketorolac   Injectable 15 milliGRAM(s) IV Push every 6 hours  lactated ringers. 1000 milliLiter(s) (100 mL/Hr) IV Continuous <Continuous>  oxyCODONE  ER Tablet 10 milliGRAM(s) Oral every 12 hours  pyridoxine 100 milliGRAM(s) Oral daily  rivaroxaban 10 milliGRAM(s) Oral daily  sodium chloride 0.9% lock flush 3 milliLiter(s) IV Push every 8 hours    MEDICATIONS  (PRN):  aluminum hydroxide/magnesium hydroxide/simethicone Suspension 30 milliLiter(s) Oral four times a day PRN Indigestion  HYDROmorphone   Tablet 2 milliGRAM(s) Oral every 3 hours PRN Severe Pain (7 - 10)  HYDROmorphone  Injectable 0.5 milliGRAM(s) IV Push every 4 hours PRN Severe Pain (7 - 10)  ondansetron Injectable 4 milliGRAM(s) IV Push every 6 hours PRN Nausea and/or Vomiting  oxyCODONE    IR 5 milliGRAM(s) Oral every 3 hours PRN Mild Pain (1 - 3)  oxyCODONE    IR 10 milliGRAM(s) Oral every 3 hours PRN Moderate Pain (4 - 6)  senna 2 Tablet(s) Oral at bedtime PRN Constipation      LABS:                          8.7    4.0   )-----------( 180      ( 12 Jan 2019 06:46 )             27.7       Culture - Surgical Swab (01.09.19 @ 21:28)    Gram Stain:   No WBC's seen.  No organisms seen    Specimen Source: .Surgical Swab left hip synovium (swabs)    Culture Results:   Culture in progress    Culture - Surgical Swab (01.09.19 @ 21:28)    Gram Stain:   Moderate White blood cells  No organisms seen    Specimen Source: .Surgical Swab left hip synovial fluid (swabs)    Culture Results:   No growth at 3 days.  Culture in progress    Culture - Surgical Swab (01.09.19 @ 21:28)    Gram Stain:   No WBC's seen.  No organisms seen    Specimen Source: .Surgical Swab left  hip acetabular (swabs)    Culture Results:   No growth at 3 days.  Culture in progress          REVIEW OF SYSTEMS:    CONSTITUTIONAL: No fever, weight loss, or fatigue  EYES: No eye pain, visual disturbances, or discharge  ENMT:  No difficulty hearing, tinnitus, vertigo; No sinus or throat pain  NECK: No pain or stiffness  RESPIRATORY: No cough, wheezing, chills or hemoptysis; No shortness of breath  CARDIOVASCULAR: No chest pain, palpitations, dizziness, or leg swelling  GASTROINTESTINAL: No abdominal or epigastric pain. No nausea, vomiting, or hematemesis; No diarrhea or constipation. No melena or hematochezia.  GENITOURINARY: No dysuria, frequency, hematuria, or incontinence  NEUROLOGICAL: No headaches, memory loss, loss of strength, numbness, or tremors  SKIN: No itching, burning, rashes, or lesions   LYMPH NODES: No enlarged glands  ENDOCRINE: No heat or cold intolerance; No hair loss  MUSCULOSKELETAL: L hip pain well controlled , L heel pain   PSYCHIATRIC: No depression, anxiety, mood swings, or difficulty sleeping  HEME/LYMPH: No easy bruising, or bleeding gums  ALLERGY AND IMMUNOLOGIC: No hives or eczema    Vital Signs Last 24 Hrs  T(C): 37.1 (13 Jan 2019 08:32), Max: 37.1 (13 Jan 2019 08:32)  T(F): 98.7 (13 Jan 2019 08:32), Max: 98.7 (13 Jan 2019 08:32)  HR: 75 (13 Jan 2019 08:32) (69 - 87)  BP: 112/61 (13 Jan 2019 08:32) (112/61 - 127/67)  BP(mean): --  RR: 18 (13 Jan 2019 08:32) (17 - 18)  SpO2: 98% (13 Jan 2019 08:32) (98% - 100%)  PHYSICAL EXAM:    GENERAL: NAD, well-groomed, well-developed  HEAD:  Atraumatic, Normocephalic  EYES: EOMI, PERRLA, conjunctiva and sclera clear  NECK: Supple, No JVD, Normal thyroid  NERVOUS SYSTEM:  Alert & Oriented X3, no focal deficit  CHEST/LUNG: CTA b/l ,  no  rales, rhonchi, wheezing, or rubs  HEART: Regular rate and rhythm; No murmurs, rubs, or gallops  ABDOMEN: Soft, Nontender, Nondistended; Bowel sounds present  EXTREMITIES:  2+ Peripheral Pulses, No clubbing, cyanosis, or edema , L hip dressing + , clean and dry   LYMPH: No lymphadenopathy noted  SKIN: No rashes or lesions Patient seen and examined .  • s/p LEFT posterior hip replacement with Abx spacer POD# 4 . Hip pain well controlled , c/o L heel pain when step on her L foot x 2 days , participating with physical therapy , no n/v , had BM yesterday , c/o chronic urinary frequency     CC : L hip pain well controlled , L heel pain while stepping on it       PAST MEDICAL & SURGICAL HISTORY:  Risk factors for obstructive sleep apnea  Bacteremia  Heart murmur  Essential hypertension  Lymphedema  DVT of upper extremity (deep vein thrombosis): RIGHT( 2015)   Breast cancer: right breast  S/P thoracentesis: 3/2016  S/P biopsy: R breast  PICC (peripherally inserted central catheter) in place: Left chest wall catheter Inserted October, 2018      MEDICATIONS  (STANDING):  acetaminophen   Tablet .. 975 milliGRAM(s) Oral every 8 hours  amLODIPine   Tablet 5 milliGRAM(s) Oral daily  cefepime   IVPB      cefepime   IVPB 2000 milliGRAM(s) IV Intermittent every 12 hours  cholecalciferol 2000 Unit(s) Oral daily  cyanocobalamin 1000 MICROGram(s) Oral daily  docusate sodium 100 milliGRAM(s) Oral three times a day  ketorolac   Injectable 15 milliGRAM(s) IV Push every 6 hours  lactated ringers. 1000 milliLiter(s) (100 mL/Hr) IV Continuous <Continuous>  oxyCODONE  ER Tablet 10 milliGRAM(s) Oral every 12 hours  pyridoxine 100 milliGRAM(s) Oral daily  rivaroxaban 10 milliGRAM(s) Oral daily  sodium chloride 0.9% lock flush 3 milliLiter(s) IV Push every 8 hours    MEDICATIONS  (PRN):  aluminum hydroxide/magnesium hydroxide/simethicone Suspension 30 milliLiter(s) Oral four times a day PRN Indigestion  HYDROmorphone   Tablet 2 milliGRAM(s) Oral every 3 hours PRN Severe Pain (7 - 10)  HYDROmorphone  Injectable 0.5 milliGRAM(s) IV Push every 4 hours PRN Severe Pain (7 - 10)  ondansetron Injectable 4 milliGRAM(s) IV Push every 6 hours PRN Nausea and/or Vomiting  oxyCODONE    IR 5 milliGRAM(s) Oral every 3 hours PRN Mild Pain (1 - 3)  oxyCODONE    IR 10 milliGRAM(s) Oral every 3 hours PRN Moderate Pain (4 - 6)  senna 2 Tablet(s) Oral at bedtime PRN Constipation      LABS:                          8.7    4.0   )-----------( 180      ( 12 Jan 2019 06:46 )             27.7       Culture - Surgical Swab (01.09.19 @ 21:28)    Gram Stain:   No WBC's seen.  No organisms seen    Specimen Source: .Surgical Swab left hip synovium (swabs)    Culture Results:   Culture in progress    Culture - Surgical Swab (01.09.19 @ 21:28)    Gram Stain:   Moderate White blood cells  No organisms seen    Specimen Source: .Surgical Swab left hip synovial fluid (swabs)    Culture Results:   No growth at 3 days.  Culture in progress    Culture - Surgical Swab (01.09.19 @ 21:28)    Gram Stain:   No WBC's seen.  No organisms seen    Specimen Source: .Surgical Swab left  hip acetabular (swabs)    Culture Results:   No growth at 3 days.  Culture in progress          REVIEW OF SYSTEMS:    CONSTITUTIONAL: No fever, weight loss, or fatigue  EYES: No eye pain, visual disturbances, or discharge  ENMT:  No difficulty hearing, tinnitus, vertigo; No sinus or throat pain  NECK: No pain or stiffness  RESPIRATORY: No cough, wheezing, chills or hemoptysis; No shortness of breath  CARDIOVASCULAR: No chest pain, palpitations, dizziness, or leg swelling  GASTROINTESTINAL: No abdominal or epigastric pain. No nausea, vomiting, or hematemesis; No diarrhea or constipation. No melena or hematochezia.  GENITOURINARY: No dysuria, frequency, hematuria, or incontinence  NEUROLOGICAL: No headaches, memory loss, loss of strength, numbness, or tremors  SKIN: No itching, burning, rashes, or lesions   LYMPH NODES: No enlarged glands  ENDOCRINE: No heat or cold intolerance; No hair loss  MUSCULOSKELETAL: L hip pain well controlled , L heel pain   PSYCHIATRIC: No depression, anxiety, mood swings, or difficulty sleeping  HEME/LYMPH: No easy bruising, or bleeding gums  ALLERGY AND IMMUNOLOGIC: No hives or eczema    Vital Signs Last 24 Hrs  T(C): 37.1 (13 Jan 2019 08:32), Max: 37.1 (13 Jan 2019 08:32)  T(F): 98.7 (13 Jan 2019 08:32), Max: 98.7 (13 Jan 2019 08:32)  HR: 75 (13 Jan 2019 08:32) (69 - 87)  BP: 112/61 (13 Jan 2019 08:32) (112/61 - 127/67)  BP(mean): --  RR: 18 (13 Jan 2019 08:32) (17 - 18)  SpO2: 98% (13 Jan 2019 08:32) (98% - 100%)  PHYSICAL EXAM:    GENERAL: NAD, well-groomed, well-developed  HEAD:  Atraumatic, Normocephalic  EYES: EOMI, PERRLA, conjunctiva and sclera clear  NECK: Supple, No JVD, Normal thyroid  NERVOUS SYSTEM:  Alert & Oriented X3, no focal deficit  CHEST/LUNG: CTA b/l ,  no  rales, rhonchi, wheezing, or rubs  HEART: Regular rate and rhythm; No murmurs, rubs, or gallops  ABDOMEN: Soft, Nontender, Nondistended; Bowel sounds present  EXTREMITIES:  2+ Peripheral Pulses, No clubbing, cyanosis, or edema , L hip dressing + , clean and dry  , L heel tenderness+ , Erick's sign negative   LYMPH: No lymphadenopathy noted  SKIN: No rashes or lesions  L anterior chest PICC line +

## 2019-01-13 NOTE — PROGRESS NOTE ADULT - SUBJECTIVE AND OBJECTIVE BOX
History: Patient is status post left  hip irrigation and debridement with placement od antibiotic spacer, POD#4. Patient is doing well and is comfortable. The patient's pain is controlled using the prescribed pain medications. The patient is participating in physical therapy, PWB LLE. Denies nausea, vomiting, chest pain, shortness of breath, abdominal pain or fever.         Vital Signs Last 24 Hrs  T(C): 36.4 (13 Jan 2019 05:40), Max: 36.7 (12 Jan 2019 23:09)  T(F): 97.6 (13 Jan 2019 05:40), Max: 98.1 (12 Jan 2019 23:09)  HR: 73 (13 Jan 2019 05:40) (69 - 87)  BP: 117/55 (13 Jan 2019 05:40) (117/55 - 127/67)  BP(mean): --  RR: 17 (13 Jan 2019 05:40) (17 - 18)  SpO2: 100% (13 Jan 2019 05:40) (98% - 100%)      Physical exam: The left hip dressing is clean, dry and intact. No drainage or discharge. No erythema is noted. No blistering. No ecchymosis. The calf is supple nontender.No calf tenderness. Sensation to light touch is grossly intact distally. The lateral cutaneous nerve is intact. Motor function distally is 5/5. No foot drop. 2+ dorsalis pedis pulse. Capillary refill is less than 2 seconds. No cyanosis.    Primary Orthopedic Assessment:  • s/p LEFT hip irrigation and debridement with antibiotic spacer pod #4    OR Cultures no growth at 3days x2  3rd culture pending    Plan:   • DVT prophylaxis as prescribed, including use of compression devices and ankle pumps  •  Continue physical therapy, PWB LLE  • • Incentive spirometry encouraged  • Pain control as clinically indicated  •• Discharge planning – anticipated discharge is subacute rehabilitation

## 2019-01-14 ENCOUNTER — TRANSCRIPTION ENCOUNTER (OUTPATIENT)
Age: 74
End: 2019-01-14

## 2019-01-14 LAB
ANION GAP SERPL CALC-SCNC: 7 MMOL/L — SIGNIFICANT CHANGE UP (ref 5–17)
APPEARANCE UR: CLEAR — SIGNIFICANT CHANGE UP
BILIRUB UR-MCNC: NEGATIVE — SIGNIFICANT CHANGE UP
BUN SERPL-MCNC: 11 MG/DL — SIGNIFICANT CHANGE UP (ref 8–20)
CALCIUM SERPL-MCNC: 9.9 MG/DL — SIGNIFICANT CHANGE UP (ref 8.6–10.2)
CHLORIDE SERPL-SCNC: 107 MMOL/L — SIGNIFICANT CHANGE UP (ref 98–107)
CO2 SERPL-SCNC: 28 MMOL/L — SIGNIFICANT CHANGE UP (ref 22–29)
COLOR SPEC: YELLOW — SIGNIFICANT CHANGE UP
CREAT SERPL-MCNC: 0.51 MG/DL — SIGNIFICANT CHANGE UP (ref 0.5–1.3)
CULTURE RESULTS: SIGNIFICANT CHANGE UP
DIFF PNL FLD: ABNORMAL
EPI CELLS # UR: SIGNIFICANT CHANGE UP
FERRITIN SERPL-MCNC: 296 NG/ML — HIGH (ref 15–150)
FOLATE SERPL-MCNC: 5.9 NG/ML — SIGNIFICANT CHANGE UP
GLUCOSE SERPL-MCNC: 97 MG/DL — SIGNIFICANT CHANGE UP (ref 70–115)
GLUCOSE UR QL: NEGATIVE MG/DL — SIGNIFICANT CHANGE UP
HCT VFR BLD CALC: 27.4 % — LOW (ref 37–47)
HGB BLD-MCNC: 8.4 G/DL — LOW (ref 12–16)
IRON SATN MFR SERPL: 23 % — SIGNIFICANT CHANGE UP (ref 14–50)
IRON SATN MFR SERPL: 48 UG/DL — SIGNIFICANT CHANGE UP (ref 37–145)
KETONES UR-MCNC: ABNORMAL
LEUKOCYTE ESTERASE UR-ACNC: ABNORMAL
MCHC RBC-ENTMCNC: 26.4 PG — LOW (ref 27–31)
MCHC RBC-ENTMCNC: 30.7 G/DL — LOW (ref 32–36)
MCV RBC AUTO: 86.2 FL — SIGNIFICANT CHANGE UP (ref 81–99)
NITRITE UR-MCNC: NEGATIVE — SIGNIFICANT CHANGE UP
PH UR: 6 — SIGNIFICANT CHANGE UP (ref 5–8)
PLATELET # BLD AUTO: 203 K/UL — SIGNIFICANT CHANGE UP (ref 150–400)
POTASSIUM SERPL-MCNC: 4.4 MMOL/L — SIGNIFICANT CHANGE UP (ref 3.5–5.3)
POTASSIUM SERPL-SCNC: 4.4 MMOL/L — SIGNIFICANT CHANGE UP (ref 3.5–5.3)
PROT UR-MCNC: NEGATIVE MG/DL — SIGNIFICANT CHANGE UP
RBC # BLD: 3.18 M/UL — LOW (ref 4.4–5.2)
RBC # FLD: 17.4 % — HIGH (ref 11–15.6)
RBC CASTS # UR COMP ASSIST: ABNORMAL /HPF (ref 0–4)
SODIUM SERPL-SCNC: 142 MMOL/L — SIGNIFICANT CHANGE UP (ref 135–145)
SP GR SPEC: 1.01 — SIGNIFICANT CHANGE UP (ref 1.01–1.02)
SPECIMEN SOURCE: SIGNIFICANT CHANGE UP
SURGICAL PATHOLOGY STUDY: SIGNIFICANT CHANGE UP
TIBC SERPL-MCNC: 212 UG/DL — LOW (ref 220–430)
TRANSFERRIN SERPL-MCNC: 148 MG/DL — LOW (ref 192–382)
UROBILINOGEN FLD QL: NEGATIVE MG/DL — SIGNIFICANT CHANGE UP
VIT B12 SERPL-MCNC: 1211 PG/ML — SIGNIFICANT CHANGE UP (ref 232–1245)
WBC # BLD: 3.7 K/UL — LOW (ref 4.8–10.8)
WBC # FLD AUTO: 3.7 K/UL — LOW (ref 4.8–10.8)
WBC UR QL: SIGNIFICANT CHANGE UP

## 2019-01-14 PROCEDURE — 99232 SBSQ HOSP IP/OBS MODERATE 35: CPT

## 2019-01-14 RX ORDER — AMLODIPINE BESYLATE 2.5 MG/1
5 TABLET ORAL DAILY
Qty: 0 | Refills: 0 | Status: DISCONTINUED | OUTPATIENT
Start: 2019-01-14 | End: 2019-01-16

## 2019-01-14 RX ORDER — ACETAMINOPHEN 500 MG
3 TABLET ORAL
Qty: 0 | Refills: 0 | DISCHARGE
Start: 2019-01-14

## 2019-01-14 RX ORDER — SENNOSIDES/DOCUSATE SODIUM 8.6MG-50MG
2 TABLET ORAL
Qty: 20 | Refills: 0
Start: 2019-01-14 | End: 2019-01-23

## 2019-01-14 RX ORDER — RIVAROXABAN 15 MG-20MG
1 KIT ORAL
Qty: 39 | Refills: 0
Start: 2019-01-14 | End: 2019-02-21

## 2019-01-14 RX ORDER — OXYCODONE HYDROCHLORIDE 5 MG/1
1 TABLET ORAL
Qty: 42 | Refills: 0
Start: 2019-01-14 | End: 2019-01-20

## 2019-01-14 RX ORDER — FLUCONAZOLE 150 MG/1
400 TABLET ORAL ONCE
Qty: 0 | Refills: 0 | Status: COMPLETED | OUTPATIENT
Start: 2019-01-14 | End: 2019-01-14

## 2019-01-14 RX ORDER — MOXIFLOXACIN HYDROCHLORIDE TABLETS, 400 MG 400 MG/1
1 TABLET, FILM COATED ORAL
Qty: 0 | Refills: 0 | COMMUNITY

## 2019-01-14 RX ADMIN — Medication 15 MILLIGRAM(S): at 12:00

## 2019-01-14 RX ADMIN — SODIUM CHLORIDE 3 MILLILITER(S): 9 INJECTION INTRAMUSCULAR; INTRAVENOUS; SUBCUTANEOUS at 21:53

## 2019-01-14 RX ADMIN — Medication 975 MILLIGRAM(S): at 16:41

## 2019-01-14 RX ADMIN — Medication 975 MILLIGRAM(S): at 05:39

## 2019-01-14 RX ADMIN — Medication 100 MILLIGRAM(S): at 11:59

## 2019-01-14 RX ADMIN — Medication 975 MILLIGRAM(S): at 16:40

## 2019-01-14 RX ADMIN — Medication 975 MILLIGRAM(S): at 22:51

## 2019-01-14 RX ADMIN — Medication 100 MILLIGRAM(S): at 21:51

## 2019-01-14 RX ADMIN — Medication 100 MILLIGRAM(S): at 16:41

## 2019-01-14 RX ADMIN — AMLODIPINE BESYLATE 5 MILLIGRAM(S): 2.5 TABLET ORAL at 05:39

## 2019-01-14 RX ADMIN — OXYCODONE HYDROCHLORIDE 10 MILLIGRAM(S): 5 TABLET ORAL at 03:16

## 2019-01-14 RX ADMIN — SODIUM CHLORIDE 3 MILLILITER(S): 9 INJECTION INTRAMUSCULAR; INTRAVENOUS; SUBCUTANEOUS at 16:39

## 2019-01-14 RX ADMIN — FLUCONAZOLE 400 MILLIGRAM(S): 150 TABLET ORAL at 21:52

## 2019-01-14 RX ADMIN — Medication 15 MILLIGRAM(S): at 06:30

## 2019-01-14 RX ADMIN — CEFEPIME 100 MILLIGRAM(S): 1 INJECTION, POWDER, FOR SOLUTION INTRAMUSCULAR; INTRAVENOUS at 17:47

## 2019-01-14 RX ADMIN — Medication 15 MILLIGRAM(S): at 05:40

## 2019-01-14 RX ADMIN — OXYCODONE HYDROCHLORIDE 10 MILLIGRAM(S): 5 TABLET ORAL at 05:39

## 2019-01-14 RX ADMIN — OXYCODONE HYDROCHLORIDE 10 MILLIGRAM(S): 5 TABLET ORAL at 17:47

## 2019-01-14 RX ADMIN — SENNA PLUS 2 TABLET(S): 8.6 TABLET ORAL at 21:50

## 2019-01-14 RX ADMIN — OXYCODONE HYDROCHLORIDE 10 MILLIGRAM(S): 5 TABLET ORAL at 04:16

## 2019-01-14 RX ADMIN — PREGABALIN 1000 MICROGRAM(S): 225 CAPSULE ORAL at 11:59

## 2019-01-14 RX ADMIN — Medication 975 MILLIGRAM(S): at 21:51

## 2019-01-14 RX ADMIN — CEFEPIME 100 MILLIGRAM(S): 1 INJECTION, POWDER, FOR SOLUTION INTRAMUSCULAR; INTRAVENOUS at 05:40

## 2019-01-14 RX ADMIN — Medication 975 MILLIGRAM(S): at 06:30

## 2019-01-14 RX ADMIN — RIVAROXABAN 10 MILLIGRAM(S): KIT at 11:59

## 2019-01-14 RX ADMIN — Medication 100 MILLIGRAM(S): at 05:39

## 2019-01-14 RX ADMIN — OXYCODONE HYDROCHLORIDE 10 MILLIGRAM(S): 5 TABLET ORAL at 06:45

## 2019-01-14 RX ADMIN — SODIUM CHLORIDE 3 MILLILITER(S): 9 INJECTION INTRAMUSCULAR; INTRAVENOUS; SUBCUTANEOUS at 05:43

## 2019-01-14 RX ADMIN — Medication 15 MILLIGRAM(S): at 17:47

## 2019-01-14 RX ADMIN — Medication 2000 UNIT(S): at 11:59

## 2019-01-14 NOTE — DIETITIAN INITIAL EVALUATION ADULT. - PHYSICAL APPEARANCE
BMI 20.2 (WNL) NFPE performed, physical findings include moderate muscle loss of temples, clavicles, shoulders, and moderate fat loss of orbitals and triceps

## 2019-01-14 NOTE — DISCHARGE NOTE ADULT - HOSPITAL COURSE
Patient presented to office with increasing native left hip pain. She had a hip aspiration performed. The aspiration culture grew out Pseudemonosa Patient presented to office with increasing native left hip pain with progression in the DJD. She had a hip aspiration performed. The aspiration culture grew out Pseudinomas. She was admitted and brought to the OR for a hip resection and antibiotic cement spacer. Cultures were taken. Patient did well following the procedure. She received continuous IV antibiotics. Cultures were followed by infectious disease. The patient was seen by PT and out of bed as PWB. The patient was continued on Xarelto for DVTP. The patient was discharged home in stable condition.

## 2019-01-14 NOTE — DISCHARGE NOTE ADULT - PLAN OF CARE
return to activities of daily living Improved function and pain control Follow all verbal and written instructions. Take medications as prescribed. DO NOT drive, operate machinery, and/or make important decisions while on prescription pain medication. DO NOT hesitate to call Doctor's office with questions or concerns.   * Keep dressing on until 1/12/2019  * Partial weight bearing of the left lower leg  * Continue all antibiotic as prescribed by ID  * Orthopedic follow-up in approximately 7-10 days  * Infectious Disease follow-up in approximately 14 days  * You may lightly wash left hip area  * Pain medications as needed  * Continue Xarelto for clot prevention Follow all verbal and written instructions. Take medications as prescribed. DO NOT drive, operate machinery, and/or make important decisions while on prescription pain medication. DO NOT hesitate to call Doctor's office with questions or concerns.   * Keep dressing on until 1/18/2019  * Partial weight bearing of the left lower leg  * Continue all antibiotic as prescribed by ID  * Orthopedic follow-up in approximately 7-10 days  * Infectious Disease follow-up in approximately 14 days  * You may lightly wash left hip area  * Pain medications as needed  * Continue Xarelto for clot prevention

## 2019-01-14 NOTE — PROGRESS NOTE ADULT - ATTENDING COMMENTS
Patient is progressing well.  Seen by me on Sunday as well.  Maintained on IV antibiotics per ID.  Pain well controlled.  OR cultures growing yeast in broth media x 1.  Will await ID recs and final culture results with plan for possible dc home tomorrow.  Partial WB LLE, posterior hip precautions.  Xarelto 10mg daily x 6 weeks DVT ppx.

## 2019-01-14 NOTE — PROGRESS NOTE ADULT - SUBJECTIVE AND OBJECTIVE BOX
DOMINIK PARKS    [id number]    History: Patient is status post left total hip antibiotic spacer, POD#5 . Patient is doing well and is comfortable. The patient's pain is controlled using the prescribed pain medications. The patient is participating in physical therapy, PWB LLE. Denies nausea, vomiting, chest pain, shortness of breath, abdominal pain or fever. No new complaints.      MEDICATIONS  (STANDING):  acetaminophen   Tablet .. 975 milliGRAM(s) Oral every 8 hours  amLODIPine   Tablet 5 milliGRAM(s) Oral daily  cefepime   IVPB      cefepime   IVPB 2000 milliGRAM(s) IV Intermittent every 12 hours  cholecalciferol 2000 Unit(s) Oral daily  cyanocobalamin 1000 MICROGram(s) Oral daily  docusate sodium 100 milliGRAM(s) Oral three times a day  ketorolac   Injectable 15 milliGRAM(s) IV Push every 6 hours  oxyCODONE  ER Tablet 10 milliGRAM(s) Oral every 12 hours  pyridoxine 100 milliGRAM(s) Oral daily  rivaroxaban 10 milliGRAM(s) Oral daily  sodium chloride 0.9% lock flush 3 milliLiter(s) IV Push every 8 hours    MEDICATIONS  (PRN):  aluminum hydroxide/magnesium hydroxide/simethicone Suspension 30 milliLiter(s) Oral four times a day PRN Indigestion  HYDROmorphone   Tablet 2 milliGRAM(s) Oral every 3 hours PRN Severe Pain (7 - 10)  HYDROmorphone  Injectable 0.5 milliGRAM(s) IV Push every 4 hours PRN Severe Pain (7 - 10)  ondansetron Injectable 4 milliGRAM(s) IV Push every 6 hours PRN Nausea and/or Vomiting  oxyCODONE    IR 5 milliGRAM(s) Oral every 3 hours PRN Mild Pain (1 - 3)  oxyCODONE    IR 10 milliGRAM(s) Oral every 3 hours PRN Moderate Pain (4 - 6)  senna 2 Tablet(s) Oral at bedtime PRN Constipation      Physical exam: The left hip dressing remains clean, dry and intact. No drainage or discharge noted on dressings.  No erythema is noted. No blistering. No ecchymosis. The calf is supple nontender. Passive range of motion is acceptable to due postoperative pain. No calf tenderness. Sensation to light touch is grossly intact distally. The lateral cutaneous nerve is intact. Motor function distally is 5/5. No foot drop. 2+ dorsalis pedis pulse. Capillary refill is less than 2 seconds. No cyanosis.    Primary Orthopedic Assessment:  • s/p LEFT hip antibiotic spacer    Secondary  Orthopedic Assessment(s):   •     Secondary  Medical Assessment(s):   •     Plan:   • DVT prophylaxis as prescribed, including use of compression devices and ankle pumps  •  Continue physical therapy  • Partial weight bearing  Left Lower Extremity.    • Incentive spirometry encouraged  • Pain control as clinically indicated  • • Discharge planning – anticipated discharge is Home after OR cultures are final

## 2019-01-14 NOTE — DIETITIAN INITIAL EVALUATION ADULT. - NS FNS SUPPLEMENTS
Ensure Enlive®/TID to optimize po intake and provide an additional 350 kcal, 20g protein per serving

## 2019-01-14 NOTE — DIETITIAN INITIAL EVALUATION ADULT. - ETIOLOGY
related to inability to meet sufficient protein-energy associated with metastatic breast cancer and s/p left hip replacement

## 2019-01-14 NOTE — DIETITIAN INITIAL EVALUATION ADULT. - PERTINENT LABORATORY DATA
01-14 Na142 mmol/L Glu 97 mg/dL K+ 4.4 mmol/L Cr  0.51 mg/dL BUN 11.0 mg/dL Phos n/a   Alb n/a   PAB n/a

## 2019-01-14 NOTE — DISCHARGE NOTE ADULT - CARE PROVIDER_API CALL
Mahendra Mora (MD), Orthopaedic Surgery  [address]  [place] [address]  Phone: [phone]  Fax: [phone] Mahendra Mora (MD), Orthopaedic Surgery  [address]  [place] [address]  Phone: [phone]  Fax: [phone]    Jose Dang), Infectious Disease; Internal Medicine  [address] [place] [address]  Phone: [phone]  Fax: [phone]

## 2019-01-14 NOTE — PROGRESS NOTE ADULT - SUBJECTIVE AND OBJECTIVE BOX
Patient seen and examined . Pain well controlled , chronic urinary frequency , left heel tenderness better today , no other complaints .    CC : L hip pain , chronic urinary frequency  , L heel tenderness better today     MEDICATIONS  (STANDING):  acetaminophen   Tablet .. 975 milliGRAM(s) Oral every 8 hours  amLODIPine   Tablet 5 milliGRAM(s) Oral daily  cefepime   IVPB 2000 milliGRAM(s) IV Intermittent every 12 hours  cefepime   IVPB      cholecalciferol 2000 Unit(s) Oral daily  cyanocobalamin 1000 MICROGram(s) Oral daily  docusate sodium 100 milliGRAM(s) Oral three times a day  ketorolac   Injectable 15 milliGRAM(s) IV Push every 6 hours  oxyCODONE  ER Tablet 10 milliGRAM(s) Oral every 12 hours  pyridoxine 100 milliGRAM(s) Oral daily  rivaroxaban 10 milliGRAM(s) Oral daily  sodium chloride 0.9% lock flush 3 milliLiter(s) IV Push every 8 hours    MEDICATIONS  (PRN):  aluminum hydroxide/magnesium hydroxide/simethicone Suspension 30 milliLiter(s) Oral four times a day PRN Indigestion  HYDROmorphone   Tablet 2 milliGRAM(s) Oral every 3 hours PRN Severe Pain (7 - 10)  HYDROmorphone  Injectable 0.5 milliGRAM(s) IV Push every 4 hours PRN Severe Pain (7 - 10)  ondansetron Injectable 4 milliGRAM(s) IV Push every 6 hours PRN Nausea and/or Vomiting  oxyCODONE    IR 5 milliGRAM(s) Oral every 3 hours PRN Mild Pain (1 - 3)  oxyCODONE    IR 10 milliGRAM(s) Oral every 3 hours PRN Moderate Pain (4 - 6)  senna 2 Tablet(s) Oral at bedtime PRN Constipation      LABS:                          8.4    3.7   )-----------( 203      ( 14 Jan 2019 07:34 )             27.4     01-14    142  |  107  |  11.0  ----------------------------<  97  4.4   |  28.0  |  0.51    Ca    9.9      14 Jan 2019 07:34    Transferrin, Serum (01.14.19 @ 07:34)    Transferrin, Serum: 148 mg/dL    Ferritin, Serum in AM (01.14.19 @ 07:34)    Ferritin, Serum: 296 ng/mL    Folate, Serum in AM (01.14.19 @ 07:34)    Folate, Serum: 5.9 ng/mL    Vitamin B12, Serum in AM (01.14.19 @ 07:34)    Vitamin B12, Serum: 1211 pg/mL    Iron with Total Binding Capacity in AM (01.14.19 @ 07:34)    % Saturation, Iron: 23 %    Iron - Total Binding Capacity.: 212 ug/dL    Iron Total, Serum: 48 ug/dL          Culture - Surgical Swab (01.09.19 @ 21:28)    Gram Stain:   No WBC's seen.  No organisms seen    Specimen Source: .Surgical Swab left hip synovium (swabs)    Culture Results:   Growth in fluid media only Yeast Identification to follow.  Culture in progress    Culture - Surgical Swab (01.09.19 @ 21:28)    Gram Stain:   Moderate White blood cells  No organisms seen    Specimen Source: .Surgical Swab left hip synovial fluid (swabs)    Culture Results:   No growth at 4 days.  Culture in progress        REVIEW OF SYSTEMS:    R hip pain well controlled , chronic urinary frequency + , L heel tenderness better today     Vital Signs Last 24 Hrs  T(C): 36.7 (14 Jan 2019 09:23), Max: 36.8 (13 Jan 2019 20:23)  T(F): 98 (14 Jan 2019 09:23), Max: 98.3 (13 Jan 2019 20:23)  HR: 88 (14 Jan 2019 12:00) (76 - 88)  BP: 94/59 (14 Jan 2019 12:00) (94/59 - 124/75)  BP(mean): --  RR: 16 (14 Jan 2019 12:00) (16 - 18)  SpO2: 98% (14 Jan 2019 12:00) (96% - 100%)    PHYSICAL EXAM:    GENERAL: NAD, well-groomed, well-developed  HEAD:  Atraumatic, Normocephalic  EYES: EOMI, PERRLA, conjunctiva and sclera clear  NECK: Supple, No JVD, Normal thyroid  NERVOUS SYSTEM:  Alert & Oriented X3, no focal deficit  CHEST/LUNG: CTA b/l ,  no  rales, rhonchi, wheezing, or rubs  HEART: Regular rate and rhythm; No murmurs, rubs, or gallops  ABDOMEN: Soft, Nontender, Nondistended; Bowel sounds present  EXTREMITIES:  2+ Peripheral Pulses, No clubbing, cyanosis, or edema , L Hip dressing + , clean and dry   LYMPH: No lymphadenopathy noted  SKIN: No rashes or lesions

## 2019-01-14 NOTE — DISCHARGE NOTE ADULT - MEDICATION SUMMARY - MEDICATIONS TO STOP TAKING
I will STOP taking the medications listed below when I get home from the hospital:    acetaminophen-oxyCODONE 325 mg-5 mg oral tablet  -- 1 tab(s) by mouth every 6 hours, As Needed - Moderate Pain

## 2019-01-14 NOTE — DISCHARGE NOTE ADULT - CARE PROVIDERS DIRECT ADDRESSES
,[email or web address]

## 2019-01-14 NOTE — CHART NOTE - NSCHARTNOTEFT_GEN_A_CORE
Upon Nutritional Assessment by the Registered Dietitian your patient was determined to meet criteria / has evidence of the following diagnosis/diagnoses:          [ ]  Mild Protein Calorie Malnutrition        [ ]  Moderate Protein Calorie Malnutrition        [x] Severe Protein Calorie Malnutrition        [ ] Unspecified Protein Calorie Malnutrition        [ ] Underweight / BMI <19        [ ] Morbid Obesity / BMI > 40    Pt presents with malnutrition (severe, chronic)  related to inability to meet sufficient protein-energy associated with metastatic breast cancer and s/p left hip replacement as evidenced by meeting <75% energy intake >1 month, 10.1% wt loss x 3 months, moderate muscle loss of temples, clavicles, shoulders, moderate fat loss of orbitals and triceps, and 2+ BL leg, left hip, right arm, and right hand edema likely masking further weight loss.       Findings as based on:  •  Comprehensive nutrition assessment and consultation  •  Calorie counts (nutrient intake analysis)  •  Food acceptance and intake status from observations by staff  •  Follow up  •  Patient education  •  Intervention secondary to interdisciplinary rounds  •   concerns      Treatment:    The following diet has been recommended:    1) Continue regular diet as tolerated.  2) Add Ensure Enlive TID to optimize po intake and provide an additional 350 kcal, 20g protein per serving.  3) Encourage po intake at all meals.       PROVIDER Section:     By signing this assessment you are acknowledging and agree with the diagnosis/diagnoses assigned by the Registered Dietitian    Comments:

## 2019-01-14 NOTE — DISCHARGE NOTE ADULT - NS AS ACTIVITY OBS
Walking-Indoors allowed/partial weight bearing with walker/Walking-Outdoors allowed Walking-Outdoors allowed/Do not drive or operate machinery/partial weight bearing with walker/No Heavy lifting/straining/Showering allowed/Walking-Indoors allowed

## 2019-01-14 NOTE — DIETITIAN INITIAL EVALUATION ADULT. - OTHER INFO
74 y/o female PMH breast cancer with mets, DVT, essential hypertension, heart murmur, lymphedema, recently treated for bacteremia, presents with arthritis of left hip s/p left hip replacement. Pt reports fair appetite/po intake PTA, states she typically consumes two meals/day (used to consume three meals/day). States her weight continues to decrease, reports ~2 years ago was 185 lbs, currently 125 lbs (noted with 14 lb wt loss from previous admission in October 2018). Pt reports she also lost a lot of weight due to diarrhea while taking a study medication, however, has resolved as pt no longer taking that medication. Reports she does drink Ensure at home and would like to receive here.

## 2019-01-14 NOTE — PROGRESS NOTE ADULT - ASSESSMENT
72 y/o female with Hx Breast cancer ( right breast ) ,  DVT of right upper extremity (deep vein thrombosis), Essential hypertension, Heart murmur, Lymphedema, Risk factors for obstructive sleep apnea .  Pt recently treated for bacteremia, pt dx with metastatic right breast cancer, diagnosed 2015, Treated with Radiation and chemotherapy, states multiple picc line insertion due to infection in the past . 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    chronic Anemia with acute post operative blood loss: s/p 2 units PRBC perioperatively , patient states she was getting iv Iron in the past . Likely Anemia - likely ABLA - as a cause of surgical blood lost on Anemia of chronic dz - asymptomatic     DVT prophylaxis as Ortho Service - on Xarelto     Hx of R breast cancer - treated with radiation / chemo , follow up with Oncologist as on outpatient and continue treatment as per Oncologist     Hx of chronic urinary frequency - will get UA

## 2019-01-14 NOTE — PROGRESS NOTE ADULT - SUBJECTIVE AND OBJECTIVE BOX
Patient in chair.   Feels well.  Pain is well controlled.  Continues to work with therapy.  Ambulating on her own in room.     FUNCTIONAL PROGRESS  1/13    Bed Mobility  Bed Mobility Training Sit-to-Supine: stand-by assist  Bed Mobility Training Limitations: decreased ROM;  decreased strength;  decreased ability to use legs for bridging/pushing    Sit-Stand Transfer Training  Transfer Training Sit-to-Stand Transfer: independent;  partial weight-bearing   rolling walker  Transfer Training Stand-to-Sit Transfer: independent;  partial weight-bearing   rolling walker    Gait Training  Gait Training: independent;  rolling walker;  25 feet  Gait Analysis: 3-point gait   decreased weight-shifting ability        REVIEW OF SYSTEMS  Constitutional - No fever,  No fatigue  HEENT - No vertigo, No neck pain  Neurological - No headaches, No memory loss, +loss of strength, No numbness, No tremors  Skin - No rashes, +lesions   Musculoskeletal - +joint pain, No joint swelling, +muscle pain  Psychiatric - No depression, No anxiety    VITALS  T(C): 36.7 (01-14-19 @ 09:23), Max: 36.8 (01-13-19 @ 20:23)  HR: 81 (01-14-19 @ 09:23) (76 - 81)  BP: 108/59 (01-14-19 @ 09:23) (108/59 - 124/75)  RR: 18 (01-14-19 @ 09:23) (18 - 18)  SpO2: 96% (01-14-19 @ 09:23) (96% - 100%)  Wt(kg): --    MEDICATIONS   acetaminophen   Tablet .. 975 milliGRAM(s) every 8 hours  aluminum hydroxide/magnesium hydroxide/simethicone Suspension 30 milliLiter(s) four times a day PRN  amLODIPine   Tablet 5 milliGRAM(s) daily  cefepime   IVPB     cefepime   IVPB 2000 milliGRAM(s) every 12 hours  cholecalciferol 2000 Unit(s) daily  cyanocobalamin 1000 MICROGram(s) daily  docusate sodium 100 milliGRAM(s) three times a day  HYDROmorphone   Tablet 2 milliGRAM(s) every 3 hours PRN  HYDROmorphone  Injectable 0.5 milliGRAM(s) every 4 hours PRN  ketorolac   Injectable 15 milliGRAM(s) every 6 hours  ondansetron Injectable 4 milliGRAM(s) every 6 hours PRN  oxyCODONE    IR 5 milliGRAM(s) every 3 hours PRN  oxyCODONE    IR 10 milliGRAM(s) every 3 hours PRN  oxyCODONE  ER Tablet 10 milliGRAM(s) every 12 hours  pyridoxine 100 milliGRAM(s) daily  rivaroxaban 10 milliGRAM(s) daily  senna 2 Tablet(s) at bedtime PRN  sodium chloride 0.9% lock flush 3 milliLiter(s) every 8 hours      RECENT LABS/IMAGING  CBC Full  -  ( 14 Jan 2019 07:34 )  WBC Count : 3.7 K/uL  Hemoglobin : 8.4 g/dL  Hematocrit : 27.4 %  Platelet Count - Automated : 203 K/uL  Mean Cell Volume : 86.2 fl  Mean Cell Hemoglobin : 26.4 pg  Mean Cell Hemoglobin Concentration : 30.7 g/dL  Auto Neutrophil # : x  Auto Lymphocyte # : x  Auto Monocyte # : x  Auto Eosinophil # : x  Auto Basophil # : x  Auto Neutrophil % : x  Auto Lymphocyte % : x  Auto Monocyte % : x  Auto Eosinophil % : x  Auto Basophil % : x    01-14    142  |  107  |  11.0  ----------------------------<  97  4.4   |  28.0  |  0.51    Ca    9.9      14 Jan 2019 07:34      ----------------------------------------------------------------------------------------  PHYSICAL EXAM  Constitutional - NAD, Comfortable  Extremities - NO edema BLE  Neurologic Exam -                    Motor -                      LEFT    UE - ShAB 5/5, EF 5/5, EE 5/5, WE 5/5,  5/5                    RIGHT UE - ShAB 5/5, EF 5/5, EE 5/5, WE 5/5,  5/5                    LEFT    LE - HF 1/5, KE 4/5, DF 5/5, PF 5/5                    RIGHT LE - HF 5/5, KE 5/5, DF 5/5, PF 5/5        Sensory - Intact to LT  Psychiatric - Mood stable, Affect WNL  ----------------------------------------------------------------------------------------  ASSESSMENT/PLAN  73yFemale with functional deficits after left THR and antibiotic spacer  Infected Left THR - PWB, Hip precautions, Cefepime  Pain - Tylenol, Dilaudid, Toradol, Oxycodone  DVT PPX - SCDs, Xarelto  Rehab -  Patient is independent.  Recommend DC HOME with HOME CARE. Will sign off, please reconsult for additional rehab dispo needs if functional status changes.

## 2019-01-14 NOTE — PROGRESS NOTE ADULT - SUBJECTIVE AND OBJECTIVE BOX
Patient received 2 units of PRBC in OR, hemoglobin level dropped from 10.3 down to 8.1 on 1/11/19 due to post-operative blood loss anemia

## 2019-01-14 NOTE — DISCHARGE NOTE ADULT - MEDICATION SUMMARY - MEDICATIONS TO TAKE
I will START or STAY ON the medications listed below when I get home from the hospital:    acetaminophen 325 mg oral tablet  -- 3 tab(s) by mouth every 8 hours  -- Indication: For pain    oxyCODONE 5 mg oral tablet  -- 1 tab(s) by mouth every 4 hours, As Needed -Mild Pain (1 - 3) MDD:6  -- Indication: For pain    rivaroxaban 10 mg oral tablet  -- 1 tab(s) by mouth every 24 hours  -- Indication: For dvt prophalaxis    rivaroxaban 10 mg oral tablet  -- 1 tab(s) by mouth once a day  -- Indication: For home med    amLODIPine 5 mg oral tablet  -- 1 tab(s) by mouth once a day  -- Indication: For home med    magnesium citrate  -- 400 cap(s) by mouth 2 times a day  -- Indication: For home med    Senna S 50 mg-8.6 mg oral tablet  -- 2 tab(s) by mouth once a day (at bedtime)   -- Medication should be taken with plenty of water.    -- Indication: For stool softner    potassium chloride 20 mEq oral tablet, extended release  -- orally once a day  -- Indication: For home med    Vitamin B-12 1000 mcg oral tablet  -- 1 tab(s) by mouth once a day  -- Indication: For home med    Vitamin D3 2000 intl units oral tablet  -- 1 tab(s) by mouth once a day  -- Indication: For home med    Vitamin B6 100 mg oral tablet  -- 1 tab(s) by mouth once a day  -- Indication: For home med I will START or STAY ON the medications listed below when I get home from the hospital:    acetaminophen 325 mg oral tablet  -- 3 tab(s) by mouth every 8 hours  -- Indication: For pain    oxyCODONE 5 mg oral tablet  -- 1 tab(s) by mouth every 4 hours, As Needed -Mild Pain (1 - 3) MDD:6  -- Indication: For pain    rivaroxaban 10 mg oral tablet  -- 1 tab(s) by mouth every 24 hours  -- Indication: For dvt prophalaxis    rivaroxaban 10 mg oral tablet  -- 1 tab(s) by mouth once a day  -- Indication: For home med    fluconazole 200 mg oral tablet  -- 2 tab(s) by mouth once a day  -- Indication: For infection    amLODIPine 5 mg oral tablet  -- 1 tab(s) by mouth once a day  -- Indication: For home med    cefepime 2 g intravenous injection  -- 2 gram(s) intravenously every 12 hours   through feb 20  weekly cbc  cmp and sed rate  -- Finish all this medication unless otherwise directed by prescriber.    -- Indication: For infection    magnesium citrate  -- 400 cap(s) by mouth 2 times a day  -- Indication: For home med    Senna S 50 mg-8.6 mg oral tablet  -- 2 tab(s) by mouth once a day (at bedtime)   -- Medication should be taken with plenty of water.    -- Indication: For stool softner    potassium chloride 20 mEq oral tablet, extended release  -- orally once a day  -- Indication: For home med    Vitamin B-12 1000 mcg oral tablet  -- 1 tab(s) by mouth once a day  -- Indication: For home med    Vitamin D3 2000 intl units oral tablet  -- 1 tab(s) by mouth once a day  -- Indication: For home med    Vitamin B6 100 mg oral tablet  -- 1 tab(s) by mouth once a day  -- Indication: For home med

## 2019-01-14 NOTE — DISCHARGE NOTE ADULT - PATIENT PORTAL LINK FT
You can access the [facility] Patient Portal, offered by [facility], by registering with the following website: [email or web address]

## 2019-01-14 NOTE — DISCHARGE NOTE ADULT - CARE PLAN
Principal Discharge DX:	Arthritis due to other bacteria, unspecified knee  Goal:	return to activities of daily living Principal Discharge DX:	Septic hip  Goal:	Improved function and pain control  Assessment and plan of treatment:	Follow all verbal and written instructions. Take medications as prescribed. DO NOT drive, operate machinery, and/or make important decisions while on prescription pain medication. DO NOT hesitate to call Doctor's office with questions or concerns.   * Keep dressing on until 1/12/2019  * Partial weight bearing of the left lower leg  * Continue all antibiotic as prescribed by ID  * Orthopedic follow-up in approximately 7-10 days  * Infectious Disease follow-up in approximately 14 days  * You may lightly wash left hip area  * Pain medications as needed  * Continue Xarelto for clot prevention Principal Discharge DX:	Septic hip  Goal:	Improved function and pain control  Assessment and plan of treatment:	Follow all verbal and written instructions. Take medications as prescribed. DO NOT drive, operate machinery, and/or make important decisions while on prescription pain medication. DO NOT hesitate to call Doctor's office with questions or concerns.   * Keep dressing on until 1/18/2019  * Partial weight bearing of the left lower leg  * Continue all antibiotic as prescribed by ID  * Orthopedic follow-up in approximately 7-10 days  * Infectious Disease follow-up in approximately 14 days  * You may lightly wash left hip area  * Pain medications as needed  * Continue Xarelto for clot prevention

## 2019-01-15 PROCEDURE — 99232 SBSQ HOSP IP/OBS MODERATE 35: CPT

## 2019-01-15 PROCEDURE — 99233 SBSQ HOSP IP/OBS HIGH 50: CPT

## 2019-01-15 RX ORDER — CEFEPIME 1 G/1
2 INJECTION, POWDER, FOR SOLUTION INTRAMUSCULAR; INTRAVENOUS
Qty: 2 | Refills: 0
Start: 2019-01-15 | End: 2019-02-19

## 2019-01-15 RX ORDER — FLUCONAZOLE 150 MG/1
400 TABLET ORAL DAILY
Qty: 0 | Refills: 0 | Status: DISCONTINUED | OUTPATIENT
Start: 2019-01-15 | End: 2019-01-16

## 2019-01-15 RX ORDER — FLUCONAZOLE 150 MG/1
2 TABLET ORAL
Qty: 30 | Refills: 0
Start: 2019-01-15

## 2019-01-15 RX ADMIN — AMLODIPINE BESYLATE 5 MILLIGRAM(S): 2.5 TABLET ORAL at 05:33

## 2019-01-15 RX ADMIN — Medication 100 MILLIGRAM(S): at 11:56

## 2019-01-15 RX ADMIN — OXYCODONE HYDROCHLORIDE 10 MILLIGRAM(S): 5 TABLET ORAL at 06:33

## 2019-01-15 RX ADMIN — SODIUM CHLORIDE 3 MILLILITER(S): 9 INJECTION INTRAMUSCULAR; INTRAVENOUS; SUBCUTANEOUS at 22:42

## 2019-01-15 RX ADMIN — Medication 975 MILLIGRAM(S): at 06:33

## 2019-01-15 RX ADMIN — CEFEPIME 100 MILLIGRAM(S): 1 INJECTION, POWDER, FOR SOLUTION INTRAMUSCULAR; INTRAVENOUS at 17:14

## 2019-01-15 RX ADMIN — Medication 975 MILLIGRAM(S): at 13:09

## 2019-01-15 RX ADMIN — SENNA PLUS 2 TABLET(S): 8.6 TABLET ORAL at 22:46

## 2019-01-15 RX ADMIN — OXYCODONE HYDROCHLORIDE 10 MILLIGRAM(S): 5 TABLET ORAL at 05:33

## 2019-01-15 RX ADMIN — Medication 100 MILLIGRAM(S): at 05:33

## 2019-01-15 RX ADMIN — CEFEPIME 100 MILLIGRAM(S): 1 INJECTION, POWDER, FOR SOLUTION INTRAMUSCULAR; INTRAVENOUS at 05:35

## 2019-01-15 RX ADMIN — SODIUM CHLORIDE 3 MILLILITER(S): 9 INJECTION INTRAMUSCULAR; INTRAVENOUS; SUBCUTANEOUS at 05:32

## 2019-01-15 RX ADMIN — OXYCODONE HYDROCHLORIDE 10 MILLIGRAM(S): 5 TABLET ORAL at 18:00

## 2019-01-15 RX ADMIN — Medication 975 MILLIGRAM(S): at 22:44

## 2019-01-15 RX ADMIN — FLUCONAZOLE 400 MILLIGRAM(S): 150 TABLET ORAL at 17:15

## 2019-01-15 RX ADMIN — RIVAROXABAN 10 MILLIGRAM(S): KIT at 11:55

## 2019-01-15 RX ADMIN — Medication 2000 UNIT(S): at 11:56

## 2019-01-15 RX ADMIN — Medication 100 MILLIGRAM(S): at 13:09

## 2019-01-15 RX ADMIN — Medication 975 MILLIGRAM(S): at 05:33

## 2019-01-15 RX ADMIN — OXYCODONE HYDROCHLORIDE 10 MILLIGRAM(S): 5 TABLET ORAL at 17:14

## 2019-01-15 RX ADMIN — Medication 100 MILLIGRAM(S): at 22:44

## 2019-01-15 RX ADMIN — PREGABALIN 1000 MICROGRAM(S): 225 CAPSULE ORAL at 11:56

## 2019-01-15 RX ADMIN — SODIUM CHLORIDE 3 MILLILITER(S): 9 INJECTION INTRAMUSCULAR; INTRAVENOUS; SUBCUTANEOUS at 13:10

## 2019-01-15 RX ADMIN — Medication 975 MILLIGRAM(S): at 14:00

## 2019-01-15 NOTE — PROGRESS NOTE ADULT - SUBJECTIVE AND OBJECTIVE BOX
[facility]  INFECTIOUS DISEASES AND INTERNAL MEDICINE at [place]  =======================================================  Murray Duncan MD  Diplomates American Board of Internal Medicine and Infectious Diseases  =======================================================    DOMINIK PARKS [id number]    Follow up: left hip    Allergies:  No Known Allergies      Medications:  acetaminophen   Tablet .. 975 milliGRAM(s) Oral every 8 hours  aluminum hydroxide/magnesium hydroxide/simethicone Suspension 30 milliLiter(s) Oral four times a day PRN  amLODIPine   Tablet 5 milliGRAM(s) Oral daily  cefepime   IVPB      cefepime   IVPB 2000 milliGRAM(s) IV Intermittent every 12 hours  cholecalciferol 2000 Unit(s) Oral daily  cyanocobalamin 1000 MICROGram(s) Oral daily  docusate sodium 100 milliGRAM(s) Oral three times a day  HYDROmorphone   Tablet 2 milliGRAM(s) Oral every 3 hours PRN  HYDROmorphone  Injectable 0.5 milliGRAM(s) IV Push every 4 hours PRN  ketorolac   Injectable 15 milliGRAM(s) IV Push every 6 hours  lactated ringers. 1000 milliLiter(s) IV Continuous <Continuous>  ondansetron Injectable 4 milliGRAM(s) IV Push every 6 hours PRN  oxyCODONE    IR 5 milliGRAM(s) Oral every 3 hours PRN  oxyCODONE    IR 10 milliGRAM(s) Oral every 3 hours PRN  oxyCODONE  ER Tablet 10 milliGRAM(s) Oral every 12 hours  pyridoxine 100 milliGRAM(s) Oral daily  rivaroxaban 10 milliGRAM(s) Oral daily  senna 2 Tablet(s) Oral at bedtime PRN  sodium chloride 0.9% lock flush 3 milliLiter(s) IV Push every 8 hours    SOCIAL       FAMILY   FAMILY HISTORY:  Family history of breast cancer in first degree relative (Sibling): s/p mastectomy  Family history of lung cancer (Father)  Family history of cancer of frontal sinus (Father): followed by enucleation    REVIEW OF SYSTEMS:  CONSTITUTIONAL:  No Fever or chills  HEENT:   No diplopia or blurred vision.  No earache, sore throat or runny nose.  CARDIOVASCULAR:  No pressure, squeezing, strangling, tightness, heaviness or aching about the chest, neck, axilla or epigastrium.  RESPIRATORY:  No cough, shortness of breath, PND or orthopnea.  GASTROINTESTINAL:  No nausea, vomiting or diarrhea.  GENITOURINARY:  No dysuria, frequency or urgency. No Blood in urine  MUSCULOSKELETAL:   AS PER HPI  SKIN:  No change in skin, hair or nails.  NEUROLOGIC:  No paresthesias, fasciculations, seizures or weakness.  PSYCHIATRIC:  No disorder of thought or mood.  ENDOCRINE:  No heat or cold intolerance, polyuria or polydipsia.  HEMATOLOGICAL:  No easy bruising or bleeding.            Physical Exam:  I ICU Vital Signs Last 24 Hrs  T(C): 36.3 (15 Iker 2019 04:14), Max: 36.9 (14 Jan 2019 19:46)  T(F): 97.3 (15 Iker 2019 04:14), Max: 98.5 (14 Jan 2019 19:46)  HR: 71 (15 Iker 2019 04:14) (71 - 88)  BP: 121/66 (15 Iker 2019 04:14) (94/59 - 135/67)  BP(mean): --  ABP: --  ABP(mean): --  RR: 18 (15 Iker 2019 04:14) (16 - 18)  SpO2: 96% (15 Iker 2019 04:14) (95% - 99%)      GEN: NAD, pleasant  HEENT: normocephalic and atraumatic. EOMI. BONY.    NECK: Supple. No carotid bruits.  No lymphadenopathy or thyromegaly.  LUNGS: Clear to auscultation.  HEART: Regular rate and rhythm without murmur.  ABDOMEN: Soft, nontender, and nondistended.  Positive bowel sounds.    : No CVA tenderness  EXTREMITIES: Without any cyanosis, clubbing, rash, lesions or edema.  MSK: no joint swelling  NEUROLOGIC: Cranial nerves II through XII are grossly intact.  PSYCHIATRIC: Appropriate affect .  SKIN: No ulceration or induration present.        Labs:                                8.4    3.7   )-----------( 203      ( 14 Jan 2019 07:34 )             27.4   01-14    142  |  107  |  11.0  ----------------------------<  97  4.4   |  28.0  |  0.51    Ca    9.9      14 Jan 2019 07:34            RECENT CULTURES:  01-09 @ 21:28 .Surgical Swab left  hip acetabular (swabs)     No growth at 2 days.  Culture in progress    No WBC's seen.  No organisms seen

## 2019-01-15 NOTE — PROGRESS NOTE ADULT - ASSESSMENT
74 y/o female S/P left hip total joint replacement, insertion of antibiotics spacer secondary to arthritis due to bacteria,   Pt recently treated for bacteremia, pt dx with metastatic right breast cancer, diagnosed 2015, Treated with Radiation  and chemotherapy,    i   PT WITH POSITIVE CULTURE  PSEUDOMONAS STARTED ON CIPRO UNTIL OR AS PT WITH INCREASING PAIN  PT WENT TO OR     SPACER PLACED    OR CULTURES neg so  EXCEPT FOR CANDIDA IN BROTH   UNCLEAR SIGNIFICANCE AS KODY OSTEO IS RARE  WILL   CONTINUE CEFEPIME for previous pseudomonas  ANTICIPATES 6 WEEKS IV ABX THROUGH 2/20  WEEKLY CBC CMP SED RATE  WILL RX DIFLUCAN FOR POSSIBEL OSTEO ALSO FOR 6 WEEKS   4OO  DAILY FOR AT LEAST 6 MONTHS    PT WITH TUNNELED PICC IN LEFT NECK PLACED BY RADIOLOGY IN OCTOBER  WILL FOLLOW up   RX GIVEN TO CASE MAANGER

## 2019-01-15 NOTE — PROGRESS NOTE ADULT - ASSESSMENT
72 y/o female with Hx Breast cancer ( right breast ) ,  DVT of right upper extremity (deep vein thrombosis), Essential hypertension, Heart murmur, Lymphedema, Risk factors for obstructive sleep apnea .  Pt recently treated for bacteremia, pt dx with metastatic right breast cancer, diagnosed 2015, Treated with Radiation and chemotherapy, states multiple picc line insertion due to infection in the past . C/O L hip pain and sent in for LEFT hip replacement with Abx spacer secondary to L hip arthritis due to bacteriemia ,   Doing well , pain well controlled , participating with physical therapy .     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   LEFT hip replacement ,  POD # 5 , ID / ortho noted  , Candida ALBICANS + , as per ID likely candida OM , Diflucan ordered PO X 6 months , continue iv Abx as per ID , follow up with ID as recommended   PT/OT/pain mgmt  DVT prophylaxis- as per ortho  Incentive spirometry  Prophylaxis of opioid  induced constipation   L heel pain - as per ortho team     Hx of DVT of right upper extremity in 2015 , was treated with full dose of Xarelto then changed to 10 mg daily  Continue with rivaroxaban 10 mg every 24 hours for dvt prophylaxis     HTN: Will continue with amlodipine 5 mg once a day with holding parameters.    chronic Anemia with acute post operative blood loss: s/p 2 units PRBC perioperatively , patient states she was getting iv Iron in the past . Likely Anemia - likely ABLA - as a cause of surgical blood lost on Anemia of chronic dz - asymptomatic     DVT prophylaxis as Ortho Service - on Xarelto     Hx of R breast cancer - treated with radiation / chemo , follow up with Oncologist as on outpatient and continue treatment as per Oncologist     Hx of chronic urinary frequency - UA noted - large blood + , patient informed , advised to see  as on outpatient . 74 y/o female with Hx Breast cancer ( right breast ) ,  DVT of right upper extremity (deep vein thrombosis), Essential hypertension, Heart murmur, Lymphedema, Risk factors for obstructive sleep apnea .  Pt recently treated for bacteremia, pt dx with metastatic right breast cancer, diagnosed 2015, Treated with Radiation and chemotherapy, states multiple picc line insertion due to infection in the past . C/O L hip pain and sent in for LEFT hip replacement with Abx spacer secondary to L hip arthritis due to bacteriemia ,   Doing well , pain well controlled , participating with physical therapy .     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   LEFT hip replacement ,  POD # 5 , ID / ortho noted  , OR culture -Candida albicans  + , as per ID likely candida OM , Diflucan ordered PO X 6 months , continue iv Abx as per ID , follow up with ID as recommended   PT/OT/pain mgmt  DVT prophylaxis- as per ortho  Incentive spirometry  Prophylaxis of opioid  induced constipation   L heel pain - as per ortho team     Hx of DVT of right upper extremity in 2015 , was treated with full dose of Xarelto then changed to 10 mg daily  Continue with rivaroxaban 10 mg every 24 hours for dvt prophylaxis     HTN: Will continue with amlodipine 5 mg once a day with holding parameters.    chronic Anemia with acute post operative blood loss: s/p 2 units PRBC perioperatively , patient states she was getting iv Iron in the past . Likely Anemia - likely ABLA - as a cause of surgical blood lost on Anemia of chronic dz - asymptomatic     DVT prophylaxis as Ortho Service - on Xarelto     Hx of R breast cancer - treated with radiation / chemo , follow up with Oncologist as on outpatient and continue treatment as per Oncologist     Hx of chronic urinary frequency - UA noted - large blood + , patient informed of Micro hematuria  , advised to see  as on outpatient .

## 2019-01-15 NOTE — PROGRESS NOTE ADULT - SUBJECTIVE AND OBJECTIVE BOX
DOMINIK PARKS    [id number]    History:  The patient is status post right hip perc pinning, POD#3.  Non operative right proximal humerus fracture in sling.  The patient's pain is controlled using the prescribed pain medications. The patient is participating in physical therapy WBAT, RLE and NWB RUE.   Denies nausea, vomiting, chest pain, shortness of breath, abdominal pain or fever. No new complaints. No acute motor or sensory changes are reported.    Vital Signs Last 24 Hrs  T(C): 36.3 (15 Iker 2019 04:14), Max: 36.9 (14 Jan 2019 19:46)  T(F): 97.3 (15 Iker 2019 04:14), Max: 98.5 (14 Jan 2019 19:46)  HR: 71 (15 Iker 2019 04:14) (71 - 88)  BP: 121/66 (15 Iker 2019 04:14) (94/59 - 135/67)  BP(mean): --  RR: 18 (15 Iker 2019 04:14) (16 - 18)  SpO2: 96% (15 Iker 2019 04:14) (95% - 99%)  I&O's Summary    14 Jan 2019 07:01  -  15 Iker 2019 07:00  --------------------------------------------------------  IN: 50 mL / OUT: 0 mL / NET: 50 mL                              8.4    3.7   )-----------( 203      ( 14 Jan 2019 07:34 )             27.4     01-14    142  |  107  |  11.0  ----------------------------<  97  4.4   |  28.0  |  0.51    Ca    9.9      14 Jan 2019 07:34        MEDICATIONS  (STANDING):  acetaminophen   Tablet .. 975 milliGRAM(s) Oral every 8 hours  amLODIPine   Tablet 5 milliGRAM(s) Oral daily  cefepime   IVPB 2000 milliGRAM(s) IV Intermittent every 12 hours  cefepime   IVPB      cholecalciferol 2000 Unit(s) Oral daily  cyanocobalamin 1000 MICROGram(s) Oral daily  docusate sodium 100 milliGRAM(s) Oral three times a day  oxyCODONE  ER Tablet 10 milliGRAM(s) Oral every 12 hours  pyridoxine 100 milliGRAM(s) Oral daily  rivaroxaban 10 milliGRAM(s) Oral daily  sodium chloride 0.9% lock flush 3 milliLiter(s) IV Push every 8 hours    MEDICATIONS  (PRN):  aluminum hydroxide/magnesium hydroxide/simethicone Suspension 30 milliLiter(s) Oral four times a day PRN Indigestion  HYDROmorphone   Tablet 2 milliGRAM(s) Oral every 3 hours PRN Severe Pain (7 - 10)  HYDROmorphone  Injectable 0.5 milliGRAM(s) IV Push every 4 hours PRN Severe Pain (7 - 10)  ondansetron Injectable 4 milliGRAM(s) IV Push every 6 hours PRN Nausea and/or Vomiting  oxyCODONE    IR 5 milliGRAM(s) Oral every 3 hours PRN Mild Pain (1 - 3)  oxyCODONE    IR 10 milliGRAM(s) Oral every 3 hours PRN Moderate Pain (4 - 6)  senna 2 Tablet(s) Oral at bedtime PRN Constipation      Physical exam.  Sling to right upper extremity. Patient is able to range her elbow, wrist and fingers.  Right hip dressing is clean, dry and intact. No wound erythema, discharge, drainage is noted. Sensation to light touch is grossly intact without focal deficit and is symmetric bilaterally. No calf tenderness. Sensation to light touch is grossly intact distally. Motor function distally is 5/5. No foot drop. 2+ dorsalis pedis pulse. Capillary refill is less than 2 seconds. No cyanosis. compartments soft non tender    Primary Orthopedic Assessment:  •   Secondary  Orthopedic Assessment(s):   •   Secondary  Medical Assessment(s):   •   Plan:   • DVT prophylaxis as prescribed, including use of compression devices and ankle pumps  • Continue physical therapy WBAT RLE, NWB RUE  •• Pain control as clinically indicated  • Incentive spirometry encouraged  • Discharge planning – anticipated discharge is  subacute rehabilitation

## 2019-01-15 NOTE — PROGRESS NOTE ADULT - SUBJECTIVE AND OBJECTIVE BOX
DOMINIK PARKS    [id number]    History: Patient is status post left posterior total hip cement spacer on 1/9/2019. Patient is doing well. The patient's pain is controlled using the prescribed pain medications. The patient is participating in physical therapy. Denies nausea, vomiting, chest pain, shortness of breath, abdominal pain or fever. No new complaints.                            8.4    3.7   )-----------( 203      ( 14 Jan 2019 07:34 )             27.4       01-14    142  |  107  |  11.0  ----------------------------<  97  4.4   |  28.0  |  0.51    Ca    9.9      14 Jan 2019 07:34      Culture - Surgical Swab (01.09.19 @ 21:28)    Gram Stain:   No WBC's seen.  No organisms seen    Specimen Source: .Surgical Swab left hip synovium (swabs)    Culture Results:   Growth in fluid media only Candida albicans      Culture - Surgical Swab (01.09.19 @ 21:28)    Gram Stain:   Moderate White blood cells  No organisms seen    Specimen Source: .Surgical Swab left hip synovial fluid (swabs)    Culture Results:   No growth at 5 days.      Culture - Surgical Swab (01.09.19 @ 21:28)    Gram Stain:   No WBC's seen.  No organisms seen    Specimen Source: .Surgical Swab left  hip acetabular (swabs)    Culture Results:   No growth at 5 days.          MEDICATIONS  (STANDING):  acetaminophen   Tablet .. 975 milliGRAM(s) Oral every 8 hours  amLODIPine   Tablet 5 milliGRAM(s) Oral daily  cefepime   IVPB 2000 milliGRAM(s) IV Intermittent every 12 hours  cefepime   IVPB      cholecalciferol 2000 Unit(s) Oral daily  cyanocobalamin 1000 MICROGram(s) Oral daily  docusate sodium 100 milliGRAM(s) Oral three times a day  oxyCODONE  ER Tablet 10 milliGRAM(s) Oral every 12 hours  pyridoxine 100 milliGRAM(s) Oral daily  rivaroxaban 10 milliGRAM(s) Oral daily  sodium chloride 0.9% lock flush 3 milliLiter(s) IV Push every 8 hours    MEDICATIONS  (PRN):  aluminum hydroxide/magnesium hydroxide/simethicone Suspension 30 milliLiter(s) Oral four times a day PRN Indigestion  HYDROmorphone   Tablet 2 milliGRAM(s) Oral every 3 hours PRN Severe Pain (7 - 10)  HYDROmorphone  Injectable 0.5 milliGRAM(s) IV Push every 4 hours PRN Severe Pain (7 - 10)  ondansetron Injectable 4 milliGRAM(s) IV Push every 6 hours PRN Nausea and/or Vomiting  oxyCODONE    IR 5 milliGRAM(s) Oral every 3 hours PRN Mild Pain (1 - 3)  oxyCODONE    IR 10 milliGRAM(s) Oral every 3 hours PRN Moderate Pain (4 - 6)  senna 2 Tablet(s) Oral at bedtime PRN Constipation      Physical exam: The left hip dressing is clean, dry and intact. No drainage or discharge. No erythema is noted. No blistering. No ecchymosis. The calf is supple nontender. Passive range of motion is acceptable to due postoperative pain. No calf tenderness. Sensation to light touch is grossly intact distally. Motor function distally is 5/5. No foot drop. 2+ dorsalis pedis pulse. Capillary refill is less than 2 seconds. No cyanosis.    Primary Orthopedic Assessment:  • s/p LEFT POSTERIOR total hip cement spacer for a septic left native hip    Secondary  Orthopedic Assessment(s):   •     Secondary  Medical Assessment(s):   Breast cancer: Breast cancer  Lymphedema: Lymphedema  Essential hypertension: Essential hypertension  post-op anemia        Plan:   • DVT prophylaxis as prescribed, including use of compression devices and ankle pumps  • Continue physical therapy  • PARTIAL Weightbearing as tolerated of the right lower extremity with assistance of a walker  • Incentive spirometry encouraged  • Pain control as clinically indicated  • Posterior hip precautions reviewed with patient  • Discharge planning – anticipated discharge is Home TODAY after final evaluation by ID  * Continue cefepime ATC

## 2019-01-15 NOTE — PROGRESS NOTE ADULT - SUBJECTIVE AND OBJECTIVE BOX
Patient seen and examined . Comfortable , pain well controlled , no other complaints .    CC : L hip pain well controlled     MEDICATIONS  (STANDING):  acetaminophen   Tablet .. 975 milliGRAM(s) Oral every 8 hours  amLODIPine   Tablet 5 milliGRAM(s) Oral daily  cefepime   IVPB 2000 milliGRAM(s) IV Intermittent every 12 hours  cefepime   IVPB      cholecalciferol 2000 Unit(s) Oral daily  cyanocobalamin 1000 MICROGram(s) Oral daily  docusate sodium 100 milliGRAM(s) Oral three times a day  fluconAZOLE   Tablet 400 milliGRAM(s) Oral daily  oxyCODONE  ER Tablet 10 milliGRAM(s) Oral every 12 hours  pyridoxine 100 milliGRAM(s) Oral daily  rivaroxaban 10 milliGRAM(s) Oral daily  sodium chloride 0.9% lock flush 3 milliLiter(s) IV Push every 8 hours    MEDICATIONS  (PRN):  aluminum hydroxide/magnesium hydroxide/simethicone Suspension 30 milliLiter(s) Oral four times a day PRN Indigestion  HYDROmorphone   Tablet 2 milliGRAM(s) Oral every 3 hours PRN Severe Pain (7 - 10)  HYDROmorphone  Injectable 0.5 milliGRAM(s) IV Push every 4 hours PRN Severe Pain (7 - 10)  ondansetron Injectable 4 milliGRAM(s) IV Push every 6 hours PRN Nausea and/or Vomiting  oxyCODONE    IR 5 milliGRAM(s) Oral every 3 hours PRN Mild Pain (1 - 3)  oxyCODONE    IR 10 milliGRAM(s) Oral every 3 hours PRN Moderate Pain (4 - 6)  senna 2 Tablet(s) Oral at bedtime PRN Constipation      LABS:                          8.4    3.7   )-----------( 203      ( 2019 07:34 )             27.4     01-    142  |  107  |  11.0  ----------------------------<  97  4.4   |  28.0  |  0.51    Ca    9.9      2019 07:34    Culture - Surgical Swab (19 @ 21:28)    Gram Stain:   No WBC's seen.  No organisms seen    Specimen Source: .Surgical Swab left hip synovium (swabs)    Culture Results:   Growth in fluid media only Candida albicans    Culture - Surgical Swab (19 @ 21:28)    Gram Stain:   Moderate White blood cells  No organisms seen    Specimen Source: .Surgical Swab left hip synovial fluid (swabs)    Culture Results:   No growth at 5 days.        Urinalysis Basic - ( 2019 21:24 )    Color: Yellow / Appearance: Clear / S.010 / pH: x  Gluc: x / Ketone: Trace  / Bili: Negative / Urobili: Negative mg/dL   Blood: x / Protein: Negative mg/dL / Nitrite: Negative   Leuk Esterase: Trace / RBC: 3-5 /HPF / WBC 0-2   Sq Epi: x / Non Sq Epi: Few / Bacteria: x        REVIEW OF SYSTEMS:    L hip pain well controlled , all other systems reviewed and are negative     Vital Signs Last 24 Hrs  T(C): 36.8 (15 Iker 2019 08:40), Max: 36.9 (2019 19:46)  T(F): 98.2 (15 Iker 2019 08:40), Max: 98.5 (2019 19:46)  HR: 79 (15 Iker 2019 08:40) (71 - 88)  BP: 124/69 (15 Iker 2019 08:40) (94/59 - 135/67)  BP(mean): --  RR: 18 (15 Iker 2019 08:40) (16 - 18)  SpO2: 97% (15 Iker 2019 08:40) (95% - 99%)    PHYSICAL EXAM:    GENERAL: NAD, well-groomed, well-developed  HEAD:  Atraumatic, Normocephalic  EYES: EOMI, PERRLA, conjunctiva and sclera clear  NECK: Supple, No JVD, Normal thyroid  NERVOUS SYSTEM:  Alert & Oriented X3, no focal deficit  CHEST/LUNG: CTA b/l ,  no  rales, rhonchi, wheezing, or rubs  HEART: Regular rate and rhythm; No murmurs, rubs, or gallops  ABDOMEN: Soft, Nontender, Nondistended; Bowel sounds present  EXTREMITIES:  2+ Peripheral Pulses, No clubbing, cyanosis, or edema , L hip dressing + , clean and dry   LYMPH: No lymphadenopathy noted  SKIN: No rashes or lesions Patient seen and examined . Comfortable , pain well controlled , L heel tenderness - better , no other complaints .    CC : L hip pain well controlled , L heel tenderness resolving     MEDICATIONS  (STANDING):  acetaminophen   Tablet .. 975 milliGRAM(s) Oral every 8 hours  amLODIPine   Tablet 5 milliGRAM(s) Oral daily  cefepime   IVPB 2000 milliGRAM(s) IV Intermittent every 12 hours  cefepime   IVPB      cholecalciferol 2000 Unit(s) Oral daily  cyanocobalamin 1000 MICROGram(s) Oral daily  docusate sodium 100 milliGRAM(s) Oral three times a day  fluconAZOLE   Tablet 400 milliGRAM(s) Oral daily  oxyCODONE  ER Tablet 10 milliGRAM(s) Oral every 12 hours  pyridoxine 100 milliGRAM(s) Oral daily  rivaroxaban 10 milliGRAM(s) Oral daily  sodium chloride 0.9% lock flush 3 milliLiter(s) IV Push every 8 hours    MEDICATIONS  (PRN):  aluminum hydroxide/magnesium hydroxide/simethicone Suspension 30 milliLiter(s) Oral four times a day PRN Indigestion  HYDROmorphone   Tablet 2 milliGRAM(s) Oral every 3 hours PRN Severe Pain (7 - 10)  HYDROmorphone  Injectable 0.5 milliGRAM(s) IV Push every 4 hours PRN Severe Pain (7 - 10)  ondansetron Injectable 4 milliGRAM(s) IV Push every 6 hours PRN Nausea and/or Vomiting  oxyCODONE    IR 5 milliGRAM(s) Oral every 3 hours PRN Mild Pain (1 - 3)  oxyCODONE    IR 10 milliGRAM(s) Oral every 3 hours PRN Moderate Pain (4 - 6)  senna 2 Tablet(s) Oral at bedtime PRN Constipation      LABS:                          8.4    3.7   )-----------( 203      ( 2019 07:34 )             27.4     01-    142  |  107  |  11.0  ----------------------------<  97  4.4   |  28.0  |  0.51    Ca    9.9      2019 07:34    Culture - Surgical Swab (19 @ 21:28)    Gram Stain:   No WBC's seen.  No organisms seen    Specimen Source: .Surgical Swab left hip synovium (swabs)    Culture Results:   Growth in fluid media only Candida albicans    Culture - Surgical Swab (19 @ 21:28)    Gram Stain:   Moderate White blood cells  No organisms seen    Specimen Source: .Surgical Swab left hip synovial fluid (swabs)    Culture Results:   No growth at 5 days.        Urinalysis Basic - ( 2019 21:24 )    Color: Yellow / Appearance: Clear / S.010 / pH: x  Gluc: x / Ketone: Trace  / Bili: Negative / Urobili: Negative mg/dL   Blood: x / Protein: Negative mg/dL / Nitrite: Negative   Leuk Esterase: Trace / RBC: 3-5 /HPF / WBC 0-2   Sq Epi: x / Non Sq Epi: Few / Bacteria: x        REVIEW OF SYSTEMS:    L hip pain well controlled , L heel tenderness better , all other systems reviewed and are negative     Vital Signs Last 24 Hrs  T(C): 36.8 (15 Iker 2019 08:40), Max: 36.9 (2019 19:46)  T(F): 98.2 (15 Iker 2019 08:40), Max: 98.5 (2019 19:46)  HR: 79 (15 Iker 2019 08:40) (71 - 88)  BP: 124/69 (15 Iker 2019 08:40) (94/59 - 135/67)  BP(mean): --  RR: 18 (15 Iker 2019 08:40) (16 - 18)  SpO2: 97% (15 Iker 2019 08:40) (95% - 99%)    PHYSICAL EXAM:    GENERAL: NAD, well-groomed, well-developed  HEAD:  Atraumatic, Normocephalic  EYES: EOMI, PERRLA, conjunctiva and sclera clear  NECK: Supple, No JVD, Normal thyroid  NERVOUS SYSTEM:  Alert & Oriented X3, no focal deficit  CHEST/LUNG: CTA b/l ,  no  rales, rhonchi, wheezing, or rubs  HEART: Regular rate and rhythm; No murmurs, rubs, or gallops  ABDOMEN: Soft, Nontender, Nondistended; Bowel sounds present  EXTREMITIES:  2+ Peripheral Pulses, No clubbing, cyanosis, or edema , L hip dressing + , clean and dry  , L heel mild tenderness +   LYMPH: No lymphadenopathy noted  SKIN: No rashes or lesions

## 2019-01-16 VITALS
OXYGEN SATURATION: 100 % | RESPIRATION RATE: 16 BRPM | HEART RATE: 91 BPM | TEMPERATURE: 98 F | DIASTOLIC BLOOD PRESSURE: 56 MMHG | SYSTOLIC BLOOD PRESSURE: 99 MMHG

## 2019-01-16 PROCEDURE — 85610 PROTHROMBIN TIME: CPT

## 2019-01-16 PROCEDURE — C1713: CPT

## 2019-01-16 PROCEDURE — 82728 ASSAY OF FERRITIN: CPT

## 2019-01-16 PROCEDURE — 87075 CULTR BACTERIA EXCEPT BLOOD: CPT

## 2019-01-16 PROCEDURE — C1889: CPT

## 2019-01-16 PROCEDURE — 85730 THROMBOPLASTIN TIME PARTIAL: CPT

## 2019-01-16 PROCEDURE — 93005 ELECTROCARDIOGRAM TRACING: CPT

## 2019-01-16 PROCEDURE — 83540 ASSAY OF IRON: CPT

## 2019-01-16 PROCEDURE — 87640 STAPH A DNA AMP PROBE: CPT

## 2019-01-16 PROCEDURE — 97530 THERAPEUTIC ACTIVITIES: CPT

## 2019-01-16 PROCEDURE — 73501 X-RAY EXAM HIP UNI 1 VIEW: CPT

## 2019-01-16 PROCEDURE — 87641 MR-STAPH DNA AMP PROBE: CPT

## 2019-01-16 PROCEDURE — 99233 SBSQ HOSP IP/OBS HIGH 50: CPT

## 2019-01-16 PROCEDURE — 86923 COMPATIBILITY TEST ELECTRIC: CPT

## 2019-01-16 PROCEDURE — 84466 ASSAY OF TRANSFERRIN: CPT

## 2019-01-16 PROCEDURE — 71045 X-RAY EXAM CHEST 1 VIEW: CPT

## 2019-01-16 PROCEDURE — 97110 THERAPEUTIC EXERCISES: CPT

## 2019-01-16 PROCEDURE — 86850 RBC ANTIBODY SCREEN: CPT

## 2019-01-16 PROCEDURE — 82607 VITAMIN B-12: CPT

## 2019-01-16 PROCEDURE — 82746 ASSAY OF FOLIC ACID SERUM: CPT

## 2019-01-16 PROCEDURE — 97116 GAIT TRAINING THERAPY: CPT

## 2019-01-16 PROCEDURE — 87070 CULTURE OTHR SPECIMN AEROBIC: CPT

## 2019-01-16 PROCEDURE — 88305 TISSUE EXAM BY PATHOLOGIST: CPT

## 2019-01-16 PROCEDURE — 97167 OT EVAL HIGH COMPLEX 60 MIN: CPT

## 2019-01-16 PROCEDURE — G0463: CPT

## 2019-01-16 PROCEDURE — 36415 COLL VENOUS BLD VENIPUNCTURE: CPT

## 2019-01-16 PROCEDURE — 99232 SBSQ HOSP IP/OBS MODERATE 35: CPT

## 2019-01-16 PROCEDURE — 80048 BASIC METABOLIC PNL TOTAL CA: CPT

## 2019-01-16 PROCEDURE — 85027 COMPLETE CBC AUTOMATED: CPT

## 2019-01-16 PROCEDURE — 73502 X-RAY EXAM HIP UNI 2-3 VIEWS: CPT

## 2019-01-16 PROCEDURE — C1776: CPT

## 2019-01-16 PROCEDURE — 86900 BLOOD TYPING SEROLOGIC ABO: CPT

## 2019-01-16 PROCEDURE — P9016: CPT

## 2019-01-16 PROCEDURE — 82962 GLUCOSE BLOOD TEST: CPT

## 2019-01-16 PROCEDURE — 86901 BLOOD TYPING SEROLOGIC RH(D): CPT

## 2019-01-16 PROCEDURE — 88311 DECALCIFY TISSUE: CPT

## 2019-01-16 PROCEDURE — 97535 SELF CARE MNGMENT TRAINING: CPT

## 2019-01-16 PROCEDURE — 81001 URINALYSIS AUTO W/SCOPE: CPT

## 2019-01-16 PROCEDURE — 83550 IRON BINDING TEST: CPT

## 2019-01-16 PROCEDURE — 36430 TRANSFUSION BLD/BLD COMPNT: CPT

## 2019-01-16 RX ADMIN — SODIUM CHLORIDE 3 MILLILITER(S): 9 INJECTION INTRAMUSCULAR; INTRAVENOUS; SUBCUTANEOUS at 05:59

## 2019-01-16 RX ADMIN — Medication 975 MILLIGRAM(S): at 05:55

## 2019-01-16 RX ADMIN — Medication 100 MILLIGRAM(S): at 11:47

## 2019-01-16 RX ADMIN — PREGABALIN 1000 MICROGRAM(S): 225 CAPSULE ORAL at 11:45

## 2019-01-16 RX ADMIN — FLUCONAZOLE 400 MILLIGRAM(S): 150 TABLET ORAL at 11:46

## 2019-01-16 RX ADMIN — CEFEPIME 100 MILLIGRAM(S): 1 INJECTION, POWDER, FOR SOLUTION INTRAMUSCULAR; INTRAVENOUS at 05:55

## 2019-01-16 RX ADMIN — OXYCODONE HYDROCHLORIDE 10 MILLIGRAM(S): 5 TABLET ORAL at 05:55

## 2019-01-16 RX ADMIN — AMLODIPINE BESYLATE 5 MILLIGRAM(S): 2.5 TABLET ORAL at 05:55

## 2019-01-16 RX ADMIN — Medication 2000 UNIT(S): at 11:45

## 2019-01-16 RX ADMIN — Medication 100 MILLIGRAM(S): at 05:55

## 2019-01-16 RX ADMIN — RIVAROXABAN 10 MILLIGRAM(S): KIT at 11:47

## 2019-01-16 NOTE — PROGRESS NOTE ADULT - PROVIDER SPECIALTY LIST ADULT
Anesthesia
Hospitalist
Infectious Disease
Orthopedics
Rehab Medicine
Hospitalist

## 2019-01-16 NOTE — PROGRESS NOTE ADULT - ASSESSMENT
74 y/o female S/P left hip total joint replacement, insertion of antibiotics spacer secondary to arthritis due to bacteria,   Pt recently treated for bacteremia, pt dx with metastatic right breast cancer, diagnosed 2015, Treated with Radiation  and chemotherapy,    i   PT WITH POSITIVE CULTURE  PSEUDOMONAS STARTED ON CIPRO UNTIL OR AS PT WITH INCREASING PAIN  PT WENT TO OR     SPACER PLACED    OR CULTURES neg so  EXCEPT FOR CANDIDA IN BROTH   UNCLEAR SIGNIFICANCE AS KODY OSTEO IS RARE  WILL   CONTINUE CEFEPIME for previous pseudomonas  PLAN  6 WEEKS IV ABX THROUGH 2/20  WEEKLY CBC CMP SED RATE  WILL RX DIFLUCAN FOR POSSIBEL OSTEO ALSO FOR 6 WEEKS   4OO  DAILY FOR AT LEAST 6 MONTHS    PT WITH TUNNELED PICC IN LEFT NECK PLACED BY RADIOLOGY IN OCTOBER  WILL FOLLOW up   IN OFFICE IN 2 WEEKS

## 2019-01-16 NOTE — PROGRESS NOTE ADULT - SUBJECTIVE AND OBJECTIVE BOX
ORTHO-POST-OP PROGRESS NOTE:      [id number]    DOMINIK PARKS      PROCEDURE: status post left posterior total hip cement spacer      DOS: 1/9/2019      History: Patient is status post left posterior total hip cement spacer on 1/9/2019. Patient is doing well. The patient's pain is controlled using the prescribed pain medications. The patient is participating in physical therapy. Denies nausea, vomiting, chest pain, shortness of breath, abdominal pain or fever. No new complaints.             I&O's Detail    15 Iker 2019 07:01  -  16 Jan 2019 07:00  --------------------------------------------------------  IN:    Solution: 50 mL  Total IN: 50 mL    OUT:  Total OUT: 0 mL    Total NET: 50 mL            acetaminophen   Tablet .. 975 milliGRAM(s) Oral every 8 hours  aluminum hydroxide/magnesium hydroxide/simethicone Suspension 30 milliLiter(s) Oral four times a day PRN  amLODIPine   Tablet 5 milliGRAM(s) Oral daily  cefepime   IVPB 2000 milliGRAM(s) IV Intermittent every 12 hours  cefepime   IVPB      cholecalciferol 2000 Unit(s) Oral daily  cyanocobalamin 1000 MICROGram(s) Oral daily  docusate sodium 100 milliGRAM(s) Oral three times a day  fluconAZOLE   Tablet 400 milliGRAM(s) Oral daily  HYDROmorphone   Tablet 2 milliGRAM(s) Oral every 3 hours PRN  HYDROmorphone  Injectable 0.5 milliGRAM(s) IV Push every 4 hours PRN  ondansetron Injectable 4 milliGRAM(s) IV Push every 6 hours PRN  oxyCODONE    IR 5 milliGRAM(s) Oral every 3 hours PRN  oxyCODONE    IR 10 milliGRAM(s) Oral every 3 hours PRN  oxyCODONE  ER Tablet 10 milliGRAM(s) Oral every 12 hours  pyridoxine 100 milliGRAM(s) Oral daily  rivaroxaban 10 milliGRAM(s) Oral daily  senna 2 Tablet(s) Oral at bedtime PRN  sodium chloride 0.9% lock flush 3 milliLiter(s) IV Push every 8 hours        T(C): 36.4 (01-16-19 @ 04:41), Max: 36.9 (01-15-19 @ 23:27)  HR: 78 (01-16-19 @ 04:41) (75 - 80)  BP: 133/70 (01-16-19 @ 04:41) (112/59 - 133/70)  RR: 18 (01-16-19 @ 04:41) (18 - 18)  SpO2: 100% (01-16-19 @ 04:41) (97% - 100%)  Wt(kg): --      Physical exam: The left hip dressing is clean, dry and intact. No drainage or discharge. No erythema is noted. No blistering. No ecchymosis. The calf is supple nontender. Passive range of motion is acceptable to due postoperative pain. No calf tenderness. Sensation to light touch is grossly intact distally. Motor function distally is 5/5. No foot drop. 2+ dorsalis pedis pulse. Capillary refill is less than 2 seconds. No cyanosis.    Primary Orthopedic Assessment:  • s/p LEFT POSTERIOR total hip cement spacer for a septic left native hip    Secondary  Orthopedic Assessment(s):   •     Secondary  Medical Assessment(s):   Breast cancer: Breast cancer  Lymphedema: Lymphedema  Essential hypertension: Essential hypertension  post-op anemia        Plan:   • DVT prophylaxis as prescribed, including use of compression devices and ankle pumps  • Continue physical therapy  • PARTIAL Weightbearing as tolerated of the right lower extremity with assistance of a walker  • Incentive spirometry encouraged  • Pain control as clinically indicated  • Posterior hip precautions reviewed with patient  • Discharge planning – anticipated discharge is Home TODAY after all home care is arranged  * Continue cefepime ATC

## 2019-01-16 NOTE — PROGRESS NOTE ADULT - SUBJECTIVE AND OBJECTIVE BOX
[facility]  INFECTIOUS DISEASES AND INTERNAL MEDICINE at [place]  =======================================================  Murray Duncan MD  Diplomates American Board of Internal Medicine and Infectious Diseases  =======================================================    DOMINIK PARKS [id number]    Follow up: left hip    Allergies:  No Known Allergies      Medications:  acetaminophen   Tablet .. 975 milliGRAM(s) Oral every 8 hours  aluminum hydroxide/magnesium hydroxide/simethicone Suspension 30 milliLiter(s) Oral four times a day PRN  amLODIPine   Tablet 5 milliGRAM(s) Oral daily  cefepime   IVPB      cefepime   IVPB 2000 milliGRAM(s) IV Intermittent every 12 hours  cholecalciferol 2000 Unit(s) Oral daily  cyanocobalamin 1000 MICROGram(s) Oral daily  docusate sodium 100 milliGRAM(s) Oral three times a day  HYDROmorphone   Tablet 2 milliGRAM(s) Oral every 3 hours PRN  HYDROmorphone  Injectable 0.5 milliGRAM(s) IV Push every 4 hours PRN  ketorolac   Injectable 15 milliGRAM(s) IV Push every 6 hours  lactated ringers. 1000 milliLiter(s) IV Continuous <Continuous>  ondansetron Injectable 4 milliGRAM(s) IV Push every 6 hours PRN  oxyCODONE    IR 5 milliGRAM(s) Oral every 3 hours PRN  oxyCODONE    IR 10 milliGRAM(s) Oral every 3 hours PRN  oxyCODONE  ER Tablet 10 milliGRAM(s) Oral every 12 hours  pyridoxine 100 milliGRAM(s) Oral daily  rivaroxaban 10 milliGRAM(s) Oral daily  senna 2 Tablet(s) Oral at bedtime PRN  sodium chloride 0.9% lock flush 3 milliLiter(s) IV Push every 8 hours    SOCIAL       FAMILY   FAMILY HISTORY:  Family history of breast cancer in first degree relative (Sibling): s/p mastectomy  Family history of lung cancer (Father)  Family history of cancer of frontal sinus (Father): followed by enucleation    REVIEW OF SYSTEMS:  CONSTITUTIONAL:  No Fever or chills  HEENT:   No diplopia or blurred vision.  No earache, sore throat or runny nose.  CARDIOVASCULAR:  No pressure, squeezing, strangling, tightness, heaviness or aching about the chest, neck, axilla or epigastrium.  RESPIRATORY:  No cough, shortness of breath, PND or orthopnea.  GASTROINTESTINAL:  No nausea, vomiting or diarrhea.  GENITOURINARY:  No dysuria, frequency or urgency. No Blood in urine  MUSCULOSKELETAL:   AS PER HPI  SKIN:  No change in skin, hair or nails.  NEUROLOGIC:  No paresthesias, fasciculations, seizures or weakness.  PSYCHIATRIC:  No disorder of thought or mood.  ENDOCRINE:  No heat or cold intolerance, polyuria or polydipsia.  HEMATOLOGICAL:  No easy bruising or bleeding.            Physical Exam:  I Vital Signs Last 24 Hrs  T(C): 36.8 (16 Jan 2019 08:18), Max: 36.9 (15 Iker 2019 23:27)  T(F): 98.2 (16 Jan 2019 08:18), Max: 98.4 (15 Iker 2019 23:27)  HR: 74 (16 Jan 2019 08:18) (74 - 80)  BP: 124/64 (16 Jan 2019 08:18) (112/59 - 133/70)  BP(mean): --  RR: 18 (16 Jan 2019 08:18) (18 - 18)  SpO2: 98% (16 Jan 2019 08:18) (97% - 100%)    GEN: NAD, pleasant  HEENT: normocephalic and atraumatic. EOMI. BONY.    NECK: Supple. No carotid bruits.  No lymphadenopathy or thyromegaly.  LUNGS: Clear to auscultation.  HEART: Regular rate and rhythm without murmur.  ABDOMEN: Soft, nontender, and nondistended.  Positive bowel sounds.    : No CVA tenderness  EXTREMITIES: Without any cyanosis, clubbing, rash, lesions or edema.  MSK: no joint swelling  NEUROLOGIC: Cranial nerves II through XII are grossly intact.  PSYCHIATRIC: Appropriate affect .  SKIN: No ulceration or induration present.        Labs:                        RECENT CULTURES:  01-09 @ 21:28 .Surgical Swab left  hip acetabular (swabs)     No growth at 2 days.  Culture in progress    No WBC's seen.  No organisms seen

## 2019-01-16 NOTE — PROGRESS NOTE ADULT - SUBJECTIVE AND OBJECTIVE BOX
Patient seen and examined . Pain well controlled , L heel pain getting better , chronic urinary frequency , no other complaints .    CC : L hip / heel pain , chronic urinary frequency       MEDICATIONS  (STANDING):  acetaminophen   Tablet .. 975 milliGRAM(s) Oral every 8 hours  amLODIPine   Tablet 5 milliGRAM(s) Oral daily  cefepime   IVPB 2000 milliGRAM(s) IV Intermittent every 12 hours  cefepime   IVPB      cholecalciferol 2000 Unit(s) Oral daily  cyanocobalamin 1000 MICROGram(s) Oral daily  docusate sodium 100 milliGRAM(s) Oral three times a day  fluconAZOLE   Tablet 400 milliGRAM(s) Oral daily  oxyCODONE  ER Tablet 10 milliGRAM(s) Oral every 12 hours  pyridoxine 100 milliGRAM(s) Oral daily  rivaroxaban 10 milliGRAM(s) Oral daily  sodium chloride 0.9% lock flush 3 milliLiter(s) IV Push every 8 hours    MEDICATIONS  (PRN):  aluminum hydroxide/magnesium hydroxide/simethicone Suspension 30 milliLiter(s) Oral four times a day PRN Indigestion  HYDROmorphone   Tablet 2 milliGRAM(s) Oral every 3 hours PRN Severe Pain (7 - 10)  HYDROmorphone  Injectable 0.5 milliGRAM(s) IV Push every 4 hours PRN Severe Pain (7 - 10)  ondansetron Injectable 4 milliGRAM(s) IV Push every 6 hours PRN Nausea and/or Vomiting  oxyCODONE    IR 5 milliGRAM(s) Oral every 3 hours PRN Mild Pain (1 - 3)  oxyCODONE    IR 10 milliGRAM(s) Oral every 3 hours PRN Moderate Pain (4 - 6)  senna 2 Tablet(s) Oral at bedtime PRN Constipation        Urinalysis Basic - ( 2019 21:24 )    Color: Yellow / Appearance: Clear / S.010 / pH: x  Gluc: x / Ketone: Trace  / Bili: Negative / Urobili: Negative mg/dL   Blood: x / Protein: Negative mg/dL / Nitrite: Negative   Leuk Esterase: Trace / RBC: 3-5 /HPF / WBC 0-2   Sq Epi: x / Non Sq Epi: Few / Bacteria: x      REVIEW OF SYSTEMS:    As above , all other systems reviewed and are negative     Vital Signs Last 24 Hrs  T(C): 36.4 (2019 04:41), Max: 36.9 (15 Iker 2019 23:27)  T(F): 97.6 (2019 04:41), Max: 98.4 (15 Iker 2019 23:27)  HR: 78 (2019 04:41) (75 - 80)  BP: 133/70 (2019 04:41) (112/59 - 133/70)  BP(mean): --  RR: 18 (2019 04:41) (18 - 18)  SpO2: 100% (2019 04:41) (97% - 100%)    PHYSICAL EXAM:    GENERAL: NAD, well-groomed, well-developed  HEAD:  Atraumatic, Normocephalic  EYES: EOMI, PERRLA, conjunctiva and sclera clear  NECK: Supple, No JVD, Normal thyroid  NERVOUS SYSTEM:  Alert & Oriented X3, no focal deficit  CHEST/LUNG: CTA b/l ,  no  rales, rhonchi, wheezing, or rubs  HEART: Regular rate and rhythm; No murmurs, rubs, or gallops  ABDOMEN: Soft, Nontender, Nondistended; Bowel sounds present  EXTREMITIES:  2+ Peripheral Pulses, No clubbing, cyanosis, or edema , L hip dressing + , clean and dry   LYMPH: No lymphadenopathy noted  SKIN: No rashes or lesions

## 2019-01-16 NOTE — PROGRESS NOTE ADULT - ASSESSMENT
72 y/o female with Hx Breast cancer ( right breast ) ,  DVT of right upper extremity (deep vein thrombosis), Essential hypertension, Heart murmur, Lymphedema, Risk factors for obstructive sleep apnea .  Pt recently treated for bacteremia, pt dx with metastatic right breast cancer, diagnosed 2015, Treated with Radiation and chemotherapy, states multiple picc line insertion due to infection in the past . C/O L hip pain and sent in for LEFT hip replacement with Abx spacer secondary to L hip arthritis due to bacteriemia ,   Doing well , pain well controlled , participating with physical therapy .     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   LEFT hip replacement ,  POD # 6 , ID / ortho noted  , OR culture -Candida albicans  + , as per ID likely candida OM , Diflucan ordered PO X 6 months , continue iv Abx as per ID , follow up with ID as recommended   PT/OT/pain mgmt  DVT prophylaxis- as per ortho  Incentive spirometry  Prophylaxis of opioid  induced constipation   L heel pain - resolving     Hx of DVT of right upper extremity in 2015 , was treated with full dose of Xarelto then changed to 10 mg daily  Continue with rivaroxaban 10 mg every 24 hours for dvt prophylaxis     HTN: Will continue with amlodipine 5 mg once a day with holding parameters.    chronic Anemia with acute post operative blood loss: s/p 2 units PRBC perioperatively , patient states she was getting iv Iron in the past . Likely Anemia - likely ABLA - as a cause of surgical blood lost on Anemia of chronic dz - asymptomatic     DVT prophylaxis as Ortho Service - on Xarelto     Hx of R breast cancer - treated with radiation / chemo , follow up with Oncologist as on outpatient and continue treatment as per Oncologist     Hx of chronic urinary frequency - UA noted - large blood + , patient informed of Micro hematuria  , advised to see  as on outpatient .

## 2019-01-18 ENCOUNTER — OTHER (OUTPATIENT)
Age: 74
End: 2019-01-18

## 2019-01-18 RX ORDER — CEFEPIME 1 G/50ML
INJECTION, SOLUTION INTRAVENOUS
Refills: 0 | Status: COMPLETED | COMMUNITY
Start: 1900-01-01 | End: 2019-02-20

## 2019-01-18 RX ORDER — ERYTHROPOIETIN 40000 [IU]/ML
INJECTION, SOLUTION INTRAVENOUS; SUBCUTANEOUS
Refills: 0 | Status: DISCONTINUED | COMMUNITY
End: 2019-01-18

## 2019-01-18 RX ORDER — CIPROFLOXACIN HYDROCHLORIDE 500 MG/1
500 TABLET, FILM COATED ORAL
Qty: 20 | Refills: 1 | Status: DISCONTINUED | COMMUNITY
Start: 2019-01-03 | End: 2019-01-18

## 2019-01-25 ENCOUNTER — OTHER (OUTPATIENT)
Age: 74
End: 2019-01-25

## 2019-01-28 ENCOUNTER — APPOINTMENT (OUTPATIENT)
Dept: ORTHOPEDIC SURGERY | Facility: CLINIC | Age: 74
End: 2019-01-28
Payer: COMMERCIAL

## 2019-01-28 VITALS
SYSTOLIC BLOOD PRESSURE: 124 MMHG | BODY MASS INDEX: 20.09 KG/M2 | HEIGHT: 66 IN | TEMPERATURE: 97.7 F | HEART RATE: 108 BPM | WEIGHT: 125 LBS | DIASTOLIC BLOOD PRESSURE: 75 MMHG

## 2019-01-28 PROCEDURE — 73502 X-RAY EXAM HIP UNI 2-3 VIEWS: CPT | Mod: LT

## 2019-01-28 PROCEDURE — 99024 POSTOP FOLLOW-UP VISIT: CPT

## 2019-01-28 NOTE — HISTORY OF PRESENT ILLNESS
[Doing Well] : is doing well [Excellent Pain Control] : has excellent pain control [No Sign of Infection] : is showing no signs of infection [Chills] : no chills [Constipation] : no constipation [Diarrhea] : no diarrhea [Dysuria] : no dysuria [Fever] : no fever [Nausea] : no nausea [Vomiting] : no vomiting [Erythema] : not erythematous [Discharge] : absent of discharge [Dehiscence] : not dehisced [de-identified] : S/P Posterior approach Left THR, Prostalac antibiotic cement spacer DOS:01/09/19 [de-identified] : The patient is a 73 year old female 19 days s/p left posterior THR with prostalac antibiotic cement spacer. She is ambulating and transferring well with a walker partial . She is undergoing home physical therapy. For DVT prophylaxis she is on Xarelto. She is on IV antibiotics for 6 weeks total. Pain is controlled well with pain medication. She denies systemic symptoms of fever or chills. She denies drainage from the incision site.  [de-identified] : Exam of the left hip shows a well healed incision.  5/5 motor strength bilaterally distally. Sensation intact distally.  [de-identified] : Xray- AP pelvis and 2 views of the left hip shows a hip antibiotic cement spacer in stable position, without sign of fracture or dislocation.  [de-identified] : The patient is doing very well 19 days after left posterior total hip replacement with prostalac antibiotic cement spacer. The patient will continue home physical therapy.  The patient will continue Xarelto DVT prophylaxis. The patient was educated on dental prophylaxis. She will remain partial weightbearing. Posterior hip precautions were reinforced. Overall the patient is very happy with their outcome so far. Followup in 4 weeks with repeat x-rays. We discussed that after one more month I may consider allowing her to be full weightbearing she will have to maintain the use of a walker long term.  We will likely leave the hip spacer in place long term as well.She is scheduled to followup with infectious disease later this week\par

## 2019-01-28 NOTE — ADDENDUM
[FreeTextEntry1] : This note was authored by Floyd Stringer working as a medical scribe for Dr. Mahendra Mora. The note was reviewed, edited, and revised by Dr. Mahendra Mora whom is in agreement with the exam findings, imaging findings, and treatment plan. 01/28/2019.

## 2019-01-30 ENCOUNTER — TRANSCRIPTION ENCOUNTER (OUTPATIENT)
Age: 74
End: 2019-01-30

## 2019-02-06 ENCOUNTER — APPOINTMENT (OUTPATIENT)
Dept: INTERNAL MEDICINE | Facility: CLINIC | Age: 74
End: 2019-02-06
Payer: COMMERCIAL

## 2019-02-06 VITALS
HEIGHT: 66 IN | DIASTOLIC BLOOD PRESSURE: 70 MMHG | SYSTOLIC BLOOD PRESSURE: 120 MMHG | BODY MASS INDEX: 21.21 KG/M2 | WEIGHT: 132 LBS

## 2019-02-06 DIAGNOSIS — M25.552 PAIN IN LEFT HIP: ICD-10-CM

## 2019-02-06 PROCEDURE — 99214 OFFICE O/P EST MOD 30 MIN: CPT | Mod: 25

## 2019-02-06 PROCEDURE — 99358 PROLONG SERVICE W/O CONTACT: CPT

## 2019-02-06 NOTE — HISTORY OF PRESENT ILLNESS
[FreeTextEntry1] : PT IS A 72YO FEMALE With PMH OF AVASCULAR NECROSIS AND OSTEOARTHRITIS OF LEFT HIP\par HAS STAGE IV BreAst CANCER ON NO CHEMO AT THE MOMENT \par WAS ON EXPERIMENTAL THERAPY FROM  ONCOLOGIST BUT WOULD NEED TO BE ABLE TO WALK FOR  PERFORMANCE REQUIREMENTS FOR CONTINUED  TREATMENT \par PT SEEN BY ORTHO AND HAD MRI AND ASPIRATION OF HIP AND   CX  POSITIVE FOR PSEUDOMONAS \par S/P HOSPITALIZED AT [facility] WENT OT OR CX POS CANDIDA\par OVERALL FEEL JAYNE ON IV CEFEPIME FOR PSEUDOMONAS

## 2019-02-06 NOTE — PHYSICAL EXAM
[General Appearance - In No Acute Distress] : in no acute distress [Sclera] : the sclera and conjunctiva were normal [PERRL With Normal Accommodation] : pupils were equal in size, round, reactive to light [Extraocular Movements] : extraocular movements were intact [Outer Ear] : the ears and nose were normal in appearance [Oropharynx] : the oropharynx was normal with no thrush [Neck Appearance] : the appearance of the neck was normal [Neck Cervical Mass (___cm)] : no neck mass was observed [Jugular Venous Distention Increased] : there was no jugular-venous distention [Thyroid Diffuse Enlargement] : the thyroid was not enlarged [Auscultation Breath Sounds / Voice Sounds] : lungs were clear to auscultation bilaterally [Heart Rate And Rhythm] : heart rate was normal and rhythm regular [Heart Sounds] : normal S1 and S2 [Heart Sounds Gallop] : no gallops [Murmurs] : no murmurs [Heart Sounds Pericardial Friction Rub] : no pericardial rub [Full Pulse] : the pedal pulses are present [Edema] : there was no peripheral edema [Bowel Sounds] : normal bowel sounds [Abdomen Soft] : soft [Abdomen Tenderness] : non-tender [Abdomen Mass (___ Cm)] : no abdominal mass palpated [Costovertebral Angle Tenderness] : no CVA tenderness [Skin Color & Pigmentation] : normal skin color and pigmentation [] : no rash [FreeTextEntry1] :  IN WHEEL CHAIR

## 2019-02-06 NOTE — REVIEW OF SYSTEMS
[Fever] : no fever [Feeling Tired] : feeling tired [Abdominal Pain] : abdominal pain [Joint Pain] : joint pain [Limb Pain] : limb pain [Negative] : Neurological

## 2019-02-06 NOTE — ASSESSMENT
[FreeTextEntry1] : PT HERE FOR FOLLOW UP FEELS OK \par WAS HOSPITALIZED AT [place] SPACER PLACED \par IV ABX WITH CEFEPIME FOR 6 WEEKS CX ALASO GREW CANDIDA AND PT PLACED ON DIFLUCAN\par CANDIDA OSTEOMYELITS IS RARE BUT PT WITH CANCER WILL PLAN SEVERAL MONTHS OF DIFLUCAN \par WILL FOLLOW UP\par \par EXTENSIVE REVIEW OF HOSP RECORDS INCLUDING LABS AND PROGRESS NOTES AND RADIOLOGY RPORTS\par DURATION OF REVIEW WAS 35 MINUTES\par \par

## 2019-02-14 ENCOUNTER — OTHER (OUTPATIENT)
Age: 74
End: 2019-02-14

## 2019-02-22 ENCOUNTER — APPOINTMENT (OUTPATIENT)
Dept: ORTHOPEDIC SURGERY | Facility: CLINIC | Age: 74
End: 2019-02-22
Payer: COMMERCIAL

## 2019-02-22 VITALS
HEART RATE: 91 BPM | BODY MASS INDEX: 21.21 KG/M2 | SYSTOLIC BLOOD PRESSURE: 131 MMHG | DIASTOLIC BLOOD PRESSURE: 77 MMHG | HEIGHT: 66 IN | WEIGHT: 132 LBS

## 2019-02-22 PROCEDURE — 73502 X-RAY EXAM HIP UNI 2-3 VIEWS: CPT | Mod: TC,LT

## 2019-02-22 PROCEDURE — 99024 POSTOP FOLLOW-UP VISIT: CPT

## 2019-02-22 NOTE — HISTORY OF PRESENT ILLNESS
[de-identified] : S/P Posterior approach Left THR, Prostalac antibiotic cement spacer DOS:01/09/19.  [de-identified] : This 73-year-old female presents for followup of her left hip. She is now 6 weeks status post left posterior THR with prostalac antibiotic cement spacer. She continues on Xarelto. She denies systemic symptoms of fever or chills. She concluded 6 weeks of IV antibiotics. She did followup with infectious disease. She is now on oral fluconazole. She reports pain to the hip is minimal. She will take Tylenol during the day and oxycodone at night for multiple body aches and pains. Patient is supposed to be partial weightbearing to the operative side. She is observed to transfer and ambulate with a walker, full weightbearing. [de-identified] : The left hip is without erythema, abrasions, or ecchymosis. Incision is well-healed. There are no signs or symptoms of infection. No pain with palpation. 5 out of 5 motor strength bilaterally distally. Sensation intact distally. [de-identified] : X-rays-AP of the pelvis and 2 views of the left hip show a hip antibiotic cement spacer in stable position. No sign of fracture or dislocation. [de-identified] : Postoperatively the patient is doing well. She has excellent pain control. She showed no signs or symptoms of infection. [de-identified] : The patient is doing well 6 weeks status post left posterior total hip replacement with prostalac antibiotic cement spacer. She will continue physical therapy. Discussed that patient should maintain partial weightbearing until today's exam findings reviewed with Dr. Mora. Maintain use of walker. Posterior hip precautions were reinforced. Overall the patient is happy with her outcome thus far. Followup in 6 weeks with repeat x-rays.

## 2019-02-28 ENCOUNTER — TRANSCRIPTION ENCOUNTER (OUTPATIENT)
Age: 74
End: 2019-02-28

## 2019-03-13 ENCOUNTER — APPOINTMENT (OUTPATIENT)
Dept: INTERNAL MEDICINE | Facility: CLINIC | Age: 74
End: 2019-03-13
Payer: COMMERCIAL

## 2019-03-13 VITALS
SYSTOLIC BLOOD PRESSURE: 100 MMHG | WEIGHT: 132 LBS | HEIGHT: 66 IN | BODY MASS INDEX: 21.21 KG/M2 | DIASTOLIC BLOOD PRESSURE: 78 MMHG

## 2019-03-13 PROCEDURE — 99214 OFFICE O/P EST MOD 30 MIN: CPT

## 2019-03-13 NOTE — HISTORY OF PRESENT ILLNESS
[FreeTextEntry1] : follow up [de-identified] :  PT IS A 74YO FEMALE With PMH OF AVASCULAR NECROSIS AND OSTEOARTHRITIS OF LEFT HIP\par HAS STAGE IV BreAst CANCER ON NO CHEMO AT THE MOMENT \par WAS ON EXPERIMENTAL THERAPY FROM ONCOLOGIST BUT WOULD NEED TO BE ABLE TO WALK FOR PERFORMANCE REQUIREMENTS FOR CONTINUED TREATMENT \par PT SEEN BY ORTHO AND HAD MRI AND ASPIRATION OF HIP AND CX POSITIVE FOR PSEUDOMONAS \par S/P HOSPITALIZED AT [facility] WENT OT OR CX POS CANDIDA\par OVERALL FEEELS OK\par HAS COMPLETED 6 WEKS IV ABX ON FEB 20TH STILL ON DIFLUCAN\par  \par

## 2019-03-14 ENCOUNTER — OUTPATIENT (OUTPATIENT)
Dept: OUTPATIENT SERVICES | Facility: HOSPITAL | Age: 74
LOS: 1 days | End: 2019-03-14
Payer: COMMERCIAL

## 2019-03-14 DIAGNOSIS — Z98.89 OTHER SPECIFIED POSTPROCEDURAL STATES: Chronic | ICD-10-CM

## 2019-03-14 DIAGNOSIS — C50.819 MALIGNANT NEOPLASM OF OVERLAPPING SITES OF UNSPECIFIED FEMALE BREAST: ICD-10-CM

## 2019-03-14 DIAGNOSIS — Z45.2 ENCOUNTER FOR ADJUSTMENT AND MANAGEMENT OF VASCULAR ACCESS DEVICE: Chronic | ICD-10-CM

## 2019-03-14 DIAGNOSIS — C50.011 MALIGNANT NEOPLASM OF NIPPLE AND AREOLA, RIGHT FEMALE BREAST: ICD-10-CM

## 2019-03-14 PROCEDURE — A9560: CPT

## 2019-03-14 PROCEDURE — 78472 GATED HEART PLANAR SINGLE: CPT | Mod: 26

## 2019-03-14 PROCEDURE — 78472 GATED HEART PLANAR SINGLE: CPT

## 2019-04-08 ENCOUNTER — OTHER (OUTPATIENT)
Age: 74
End: 2019-04-08

## 2019-04-09 ENCOUNTER — APPOINTMENT (OUTPATIENT)
Dept: ORTHOPEDIC SURGERY | Facility: CLINIC | Age: 74
End: 2019-04-09
Payer: COMMERCIAL

## 2019-04-09 VITALS
HEIGHT: 66 IN | TEMPERATURE: 97.7 F | BODY MASS INDEX: 21.21 KG/M2 | SYSTOLIC BLOOD PRESSURE: 115 MMHG | WEIGHT: 132 LBS | DIASTOLIC BLOOD PRESSURE: 73 MMHG | HEART RATE: 103 BPM

## 2019-04-09 DIAGNOSIS — T84.52XA INFECTION AND INFLAMMATORY REACTION DUE TO INTERNAL LEFT HIP PROSTHESIS, INITIAL ENCOUNTER: ICD-10-CM

## 2019-04-09 PROCEDURE — 99024 POSTOP FOLLOW-UP VISIT: CPT

## 2019-04-09 PROCEDURE — 73502 X-RAY EXAM HIP UNI 2-3 VIEWS: CPT

## 2019-04-09 NOTE — HISTORY OF PRESENT ILLNESS
[___ Weeks Post Op] : [unfilled] weeks post op [Xray (Date:___)] : [unfilled] Xray -  [Excellent Pain Control] : has excellent pain control [Doing Well] : is doing well [No Sign of Infection] : is showing no signs of infection [Chills] : no chills [Constipation] : no constipation [Diarrhea] : no diarrhea [Dysuria] : no dysuria [Fever] : no fever [Nausea] : no nausea [Vomiting] : no vomiting [Erythema] : not erythematous [Discharge] : absent of discharge [Dehiscence] : not dehisced [de-identified] : Patient presents today for evaluation of a left hip cement spacer at approximately 3 months. She continues to follow up with infectious disease. She continues her antifungal medication as prescribed by ID. She continues to attempt partial weightbearing. She is having difficulties with full compliance because of right upper extremity lymphedema. She has not had any dislocations. With full weightbearing on the left side she has not noticed any discomfort or any change.  No injuries or falls are reported. No other related complaints.\par \par  [de-identified] : S/P Posterior approach Left THR, Prostalac antibiotic cement spacer DOS:01/09/19. [de-identified] : Exam of the left hip shows a well healed incision. Smooth and pain free hip ROM. 5/5 motor strength bilaterally distally. Sensation intact distally.  [de-identified] : Imaging: Xray- AP pelvis and 2 views of the left hip shows a hip antibiotic cement spacer in stable position, without sign of fracture or dislocation. no migration [de-identified] : The patient is a 73 year old female 3 months s/p Posterior approach Left THR, Prostalac antibiotic cement spacer. She will transition to weightbearing as tolerated. She is already putting full weight on it and x-rays show no changes.  In order to improve her function I am going to permit her full weightbearing and advised her to continue the walker and she may eventually progress to a cane.  She was advised to continue posterior hip precautions.  I would like to see her back in 3 months.She will continue home strengthening exercises.

## 2019-04-09 NOTE — ADDENDUM
[FreeTextEntry1] : This note was authored by Floyd Stringer working as a medical scribe for Dr. Mahendra Mora. The note was reviewed, edited, and revised by Dr. Mahendra Mora whom is in agreement with the exam findings, imaging findings, and treatment plan. 04/09/2019.

## 2019-04-17 ENCOUNTER — APPOINTMENT (OUTPATIENT)
Dept: INTERNAL MEDICINE | Facility: CLINIC | Age: 74
End: 2019-04-17
Payer: COMMERCIAL

## 2019-04-17 VITALS
WEIGHT: 129 LBS | SYSTOLIC BLOOD PRESSURE: 110 MMHG | BODY MASS INDEX: 20.73 KG/M2 | DIASTOLIC BLOOD PRESSURE: 70 MMHG | HEIGHT: 66 IN

## 2019-04-17 PROCEDURE — 99214 OFFICE O/P EST MOD 30 MIN: CPT | Mod: 25

## 2019-04-17 PROCEDURE — 36415 COLL VENOUS BLD VENIPUNCTURE: CPT

## 2019-04-17 NOTE — PHYSICAL EXAM
[No Acute Distress] : no acute distress [Well Nourished] : well nourished [Well Developed] : well developed [Well-Appearing] : well-appearing [Normal Sclera/Conjunctiva] : normal sclera/conjunctiva [PERRL] : pupils equal round and reactive to light [EOMI] : extraocular movements intact [Normal Outer Ear/Nose] : the outer ears and nose were normal in appearance [Normal Oropharynx] : the oropharynx was normal [No JVD] : no jugular venous distention [Supple] : supple [No Lymphadenopathy] : no lymphadenopathy [Thyroid Normal, No Nodules] : the thyroid was normal and there were no nodules present [No Respiratory Distress] : no respiratory distress  [Clear to Auscultation] : lungs were clear to auscultation bilaterally [No Accessory Muscle Use] : no accessory muscle use [Normal Rate] : normal rate  [Regular Rhythm] : with a regular rhythm [No Murmur] : no murmur heard [Normal S1, S2] : normal S1 and S2 [No Carotid Bruits] : no carotid bruits [No Abdominal Bruit] : a ~M bruit was not heard ~T in the abdomen [No Varicosities] : no varicosities [Pedal Pulses Present] : the pedal pulses are present [No Edema] : there was no peripheral edema [No Extremity Clubbing/Cyanosis] : no extremity clubbing/cyanosis [No Palpable Aorta] : no palpable aorta [Soft] : abdomen soft [Non Tender] : non-tender [Non-distended] : non-distended [No Masses] : no abdominal mass palpated [No HSM] : no HSM [Normal Bowel Sounds] : normal bowel sounds [Normal Posterior Cervical Nodes] : no posterior cervical lymphadenopathy [Normal Anterior Cervical Nodes] : no anterior cervical lymphadenopathy [No CVA Tenderness] : no CVA  tenderness [No Spinal Tenderness] : no spinal tenderness [No Rash] : no rash [Normal Gait] : normal gait [No Focal Deficits] : no focal deficits [Coordination Grossly Intact] : coordination grossly intact [Deep Tendon Reflexes (DTR)] : deep tendon reflexes were 2+ and symmetric [Normal Affect] : the affect was normal [Normal Insight/Judgement] : insight and judgment were intact [de-identified] : LEFT HIP SURGICAL SITE CLEAN

## 2019-04-17 NOTE — PLAN
[FreeTextEntry1] : PT HERE FOR FOLLOW UP FEELS OK \par WAS HOSPITALIZED AT [place] SPACER PLACED \par COMPLETED IIV ABX WITH CEFEPIME FOR 6 WEEKS CX  ALSO GREW CANDIDA AND PT PLACED ON DIFLUCAN\par CANDIDA OSTEOMYELITS IS RARE BUT PT WITH CANCER   PLAN SEVERAL MONTHS OF DIFLUCAN \par WILL RENEW DIFLUCAN \par WILL COMPLETE AT LEAST 6 MONTHS OVERALL STABLE WILL CHECK LABS

## 2019-05-01 ENCOUNTER — APPOINTMENT (OUTPATIENT)
Dept: CARDIOLOGY | Facility: CLINIC | Age: 74
End: 2019-05-01
Payer: COMMERCIAL

## 2019-05-01 ENCOUNTER — NON-APPOINTMENT (OUTPATIENT)
Age: 74
End: 2019-05-01

## 2019-05-01 VITALS
HEART RATE: 76 BPM | OXYGEN SATURATION: 98 % | WEIGHT: 129 LBS | HEIGHT: 66 IN | BODY MASS INDEX: 20.73 KG/M2 | DIASTOLIC BLOOD PRESSURE: 67 MMHG | SYSTOLIC BLOOD PRESSURE: 117 MMHG

## 2019-05-01 PROCEDURE — 93000 ELECTROCARDIOGRAM COMPLETE: CPT

## 2019-05-01 PROCEDURE — 99215 OFFICE O/P EST HI 40 MIN: CPT

## 2019-05-01 NOTE — CARDIOLOGY SUMMARY
[___] : [unfilled] [LVEF ___%] : LVEF [unfilled]% English [Normal] : normal LA size [None] : no mitral regurgitation [FreeTextEntry2] : LVEF 70%. GLS = -20%. no significant valvular abnormality  [de-identified] : MUGA scan: 3/14/2019:LVEf 63% [de-identified] : 1/8/2019

## 2019-05-01 NOTE — HISTORY OF PRESENT ILLNESS
[FreeTextEntry1] : reason for appt: Pre-operative cardiovascular risk evaluation and management - left hip replacement, encounter for monitoring of cardiotoxic chemotherapy,\par patient. tolerated hip replacement well.No cardia complications.\par no chest pain. no dyspnea. no Le edema. \par she got physical therapy. no palptiaitons. no dizziness. no syncope,. \par \par \par \par old note: Patient has complicated non cardiac history.  \par This is a73 year old woman with stage 4 metastatic cancer diagnosed nov 2015, no surgery chemoradiation and study drug.  patient has history of PIcc line infection and also infection of the left hip.  now for surgery of left hip.  \par cannot walk. dyspnea on exertion. decrease PO intake. \par \par

## 2019-05-01 NOTE — DISCUSSION/SUMMARY
[Patient] : the patient [Risks] : risks [Alternatives] : alternatives [___ Month(s)] : [unfilled] month(s) [Benefits] : benefits [FreeTextEntry1] : This is a 73 year old woman with history of breast ca, stage metastatic to LNs, history of unprovoked DVT, here for Pre-operative cardiovascular risk evaluation and management for left hip surgery\par 1) Pre-operative cardiovascular risk evaluation and management : tolerated surgery well. no cardiac complications\par 2) encounter for cardiotoxic therapeutic agents : Patient had recent MUGA EF normal. \par Recommended doing echocardiogram instead of MUGA scan to monitor EF and cardiotoxicity.  \par 3) Sinus  tachycardia: dehydration:  improved. resolved. \par 4) hypertension : decrease amlodipine. to 2.5 mg daily . will try and may increase if it goes above 130/80; \par 5) deep vein thrombosis : unprovoked DVT; ct xarelto PPX dose, 10 mg daily.\par \par  [With Me] : with me

## 2019-05-01 NOTE — PHYSICAL EXAM
[General Appearance - Well Developed] : well developed [Well Groomed] : well groomed [General Appearance - Well Nourished] : well nourished [Normal Appearance] : normal appearance [General Appearance - In No Acute Distress] : no acute distress [No Deformities] : no deformities [Normal Conjunctiva] : the conjunctiva exhibited no abnormalities [Normal Oral Mucosa] : normal oral mucosa [Eyelids - No Xanthelasma] : the eyelids demonstrated no xanthelasmas [No Oral Pallor] : no oral pallor [No Oral Cyanosis] : no oral cyanosis [Normal Jugular Venous A Waves Present] : normal jugular venous A waves present [No Jugular Venous Bridges A Waves] : no jugular venous bridges A waves [Normal Jugular Venous V Waves Present] : normal jugular venous V waves present [Exaggerated Use Of Accessory Muscles For Inspiration] : no accessory muscle use [Respiration, Rhythm And Depth] : normal respiratory rhythm and effort [Auscultation Breath Sounds / Voice Sounds] : lungs were clear to auscultation bilaterally [Heart Rate And Rhythm] : heart rate and rhythm were normal [Heart Sounds] : normal S1 and S2 [Murmurs] : no murmurs present [Abdomen Soft] : soft [Abdomen Tenderness] : non-tender [Abdomen Mass (___ Cm)] : no abdominal mass palpated [Cyanosis, Localized] : no localized cyanosis [Petechial Hemorrhages (___cm)] : no petechial hemorrhages [Nail Clubbing] : no clubbing of the fingernails [Skin Color & Pigmentation] : normal skin color and pigmentation [No Venous Stasis] : no venous stasis [Skin Lesions] : no skin lesions [] : no rash [No Skin Ulcers] : no skin ulcer [No Xanthoma] : no  xanthoma was observed [Oriented To Time, Place, And Person] : oriented to person, place, and time [Affect] : the affect was normal [FreeTextEntry1] : poor skin tugor. dehydrated  [Mood] : the mood was normal [No Anxiety] : not feeling anxious

## 2019-06-04 ENCOUNTER — RX RENEWAL (OUTPATIENT)
Age: 74
End: 2019-06-04

## 2019-06-19 ENCOUNTER — APPOINTMENT (OUTPATIENT)
Dept: INTERNAL MEDICINE | Facility: CLINIC | Age: 74
End: 2019-06-19
Payer: COMMERCIAL

## 2019-06-19 VITALS
SYSTOLIC BLOOD PRESSURE: 110 MMHG | HEIGHT: 66 IN | DIASTOLIC BLOOD PRESSURE: 65 MMHG | BODY MASS INDEX: 20.73 KG/M2 | WEIGHT: 129 LBS

## 2019-06-19 DIAGNOSIS — Z96.649 PRESENCE OF UNSPECIFIED ARTIFICIAL HIP JOINT: ICD-10-CM

## 2019-06-19 DIAGNOSIS — C50.919 MALIGNANT NEOPLASM OF UNSPECIFIED SITE OF UNSPECIFIED FEMALE BREAST: ICD-10-CM

## 2019-06-19 DIAGNOSIS — B95.2 ENTEROCOCCUS AS THE CAUSE OF DISEASES CLASSIFIED ELSEWHERE: ICD-10-CM

## 2019-06-19 DIAGNOSIS — M00.9 PYOGENIC ARTHRITIS, UNSPECIFIED: ICD-10-CM

## 2019-06-19 DIAGNOSIS — M87.052 IDIOPATHIC ASEPTIC NECROSIS OF LEFT FEMUR: ICD-10-CM

## 2019-06-19 PROCEDURE — 99214 OFFICE O/P EST MOD 30 MIN: CPT

## 2019-06-19 NOTE — PHYSICAL EXAM
[No Acute Distress] : no acute distress [Well Nourished] : well nourished [Well Developed] : well developed [Well-Appearing] : well-appearing [Normal Sclera/Conjunctiva] : normal sclera/conjunctiva [PERRL] : pupils equal round and reactive to light [EOMI] : extraocular movements intact [Normal Outer Ear/Nose] : the outer ears and nose were normal in appearance [Normal Oropharynx] : the oropharynx was normal [No JVD] : no jugular venous distention [Supple] : supple [No Lymphadenopathy] : no lymphadenopathy [Thyroid Normal, No Nodules] : the thyroid was normal and there were no nodules present [No Respiratory Distress] : no respiratory distress  [Clear to Auscultation] : lungs were clear to auscultation bilaterally [No Accessory Muscle Use] : no accessory muscle use [Normal Rate] : normal rate  [Regular Rhythm] : with a regular rhythm [Normal S1, S2] : normal S1 and S2 [No Murmur] : no murmur heard [No Carotid Bruits] : no carotid bruits [No Abdominal Bruit] : a ~M bruit was not heard ~T in the abdomen [Pedal Pulses Present] : the pedal pulses are present [No Varicosities] : no varicosities [No Edema] : there was no peripheral edema [No Extremity Clubbing/Cyanosis] : no extremity clubbing/cyanosis [Soft] : abdomen soft [No Palpable Aorta] : no palpable aorta [Non Tender] : non-tender [Non-distended] : non-distended [No Masses] : no abdominal mass palpated [No HSM] : no HSM [Normal Posterior Cervical Nodes] : no posterior cervical lymphadenopathy [Normal Anterior Cervical Nodes] : no anterior cervical lymphadenopathy [Normal Bowel Sounds] : normal bowel sounds [No CVA Tenderness] : no CVA  tenderness [No Spinal Tenderness] : no spinal tenderness [Normal Gait] : normal gait [No Rash] : no rash [Coordination Grossly Intact] : coordination grossly intact [No Focal Deficits] : no focal deficits [Deep Tendon Reflexes (DTR)] : deep tendon reflexes were 2+ and symmetric [de-identified] : LET HIP  SURGICAL SITE OK NO LAUREN DRAKE AMANDA NONTENDRE [Normal Insight/Judgement] : insight and judgment were intact [Normal Affect] : the affect was normal

## 2019-06-19 NOTE — HISTORY OF PRESENT ILLNESS
[FreeTextEntry1] : Follow Up After Post Op [de-identified] : PT HERE FOR FOLLOW UP FEELS OK NO MAJOR COMPLAINT HAS BEEN OFF ABX AND STILL ON DIFLUCAN FOR CANDIDA OSTEOMYELITIS OVERALL EELS WELL ON CHEMO NOW IS WEIGHT BEARING  USES A CANE AND  FOLLOWING UP  WITH DR NUNES\par NO FEVERS NO CHILLS  ON ORAL CHEMO

## 2019-06-19 NOTE — PLAN
[FreeTextEntry1] : PT HERE FOR FOLLOW UP FEELS OK \par WAS HOSPITALIZED AT [facility]AC[facility]ACED \par COMPLETED IIV ABX WITH CEFEPIME FOR 6 WEEKS CX  ALSO GREW CANDIDA AND PT PLACED ON DIFLUCAN\par MONICA OSTEOMYELITIS IS RARE BUT PT WITH CANCER   PLAN\par WAS  SEVERAL MONTHS OF DIFLUCAN \par PT HAS \par  COMPLETED  AT LEAST 6 MONTHS \par OVERALL STABLE\par WILL D/C DIFLUCAN \par WILL FOLLOW UP AS NEEDED \par

## 2019-07-01 ENCOUNTER — OTHER (OUTPATIENT)
Age: 74
End: 2019-07-01

## 2019-07-11 ENCOUNTER — APPOINTMENT (OUTPATIENT)
Dept: ORTHOPEDIC SURGERY | Facility: CLINIC | Age: 74
End: 2019-07-11
Payer: COMMERCIAL

## 2019-07-11 VITALS
BODY MASS INDEX: 20.73 KG/M2 | HEIGHT: 66 IN | SYSTOLIC BLOOD PRESSURE: 116 MMHG | WEIGHT: 129 LBS | DIASTOLIC BLOOD PRESSURE: 72 MMHG | HEART RATE: 83 BPM

## 2019-07-11 DIAGNOSIS — Z47.1 AFTERCARE FOLLOWING JOINT REPLACEMENT SURGERY: ICD-10-CM

## 2019-07-11 DIAGNOSIS — Z96.642 AFTERCARE FOLLOWING JOINT REPLACEMENT SURGERY: ICD-10-CM

## 2019-07-11 PROCEDURE — 99213 OFFICE O/P EST LOW 20 MIN: CPT

## 2019-07-11 PROCEDURE — 73502 X-RAY EXAM HIP UNI 2-3 VIEWS: CPT

## 2019-07-11 RX ORDER — OXYCODONE 5 MG/1
5 TABLET ORAL EVERY 6 HOURS
Refills: 0 | Status: DISCONTINUED | COMMUNITY
End: 2019-07-11

## 2019-07-11 RX ORDER — FLUCONAZOLE 200 MG/1
200 TABLET ORAL DAILY
Qty: 60 | Refills: 0 | Status: DISCONTINUED | COMMUNITY
Start: 2019-06-04 | End: 2019-07-11

## 2019-07-11 NOTE — HISTORY OF PRESENT ILLNESS
[de-identified] : The patient is a 73 year old female 6 months s/p posterior approach Left THR, Prostalac antibiotic cement spacer. She is ambulating and transferring well with a cane. She reports occasional discomfort, but not significant pain. She has discontinued oral antibiotics at this time. Overall, she is very happy with the results of the surgery.

## 2019-07-11 NOTE — DISCUSSION/SUMMARY
[de-identified] : The patient is a 73 year old female 6 months s/p posterior approach Left THR, Prostalac antibiotic cement spacer. She is fully weightbearing with a cane and at times with no assistive devices.  She is not having pain and she is very happy.  She is no longer on antibiotics.  She is exhibiting no signs of recurrent infection.  She may continue weightbearing as tolerated.  The implants appear stable on x-ray.  I would like to see her back in 6 months.

## 2019-07-11 NOTE — REASON FOR VISIT
[Follow-Up Visit] : a follow-up visit for [Other: ____] : [unfilled] [FreeTextEntry2] : S/P Posterior approach Left THR, Prostalac antibiotic cement spacer DOS:01/09/19. \par

## 2019-07-11 NOTE — PHYSICAL EXAM
[de-identified] : The patient appears well nourished  and in no apparent distress.  The patient is alert and oriented to person, place, and time.   Affect and mood appear normal. The head is normocephalic and atraumatic.  The eyes reveal normal sclera and extra ocular muscles are intact. The tongue is midline with no apparent lesions.  Skin shows normal turgor with no evidence of eczema or psoriasis.  No respiratory distress noted.  Sensation grossly intact.\par   [de-identified] : Exam of the left hip shows a well healed incision. SLR without difficulty, hip flexion of 90 degrees, external rotation of 50 degrees, internal rotation of 10 degrees. 5/5 motor strength bilaterally distally. Sensation intact distally.  [de-identified] : Imaging: Xray- AP pelvis and 2 views of the left hip shows a hip antibiotic cement spacer in stable position, without sign of fracture or dislocation.

## 2019-07-11 NOTE — ADDENDUM
[FreeTextEntry1] : This note was authored by Floyd Stringer working as a medical scribe for Dr. Mahendra Mora. The note was reviewed, edited, and revised by Dr. Mahendra Mora whom is in agreement with the exam findings, imaging findings, and treatment plan. 07/11/2019.

## 2019-08-16 ENCOUNTER — OUTPATIENT (OUTPATIENT)
Dept: OUTPATIENT SERVICES | Facility: HOSPITAL | Age: 74
LOS: 1 days | End: 2019-08-16
Payer: COMMERCIAL

## 2019-08-16 DIAGNOSIS — C50.011 MALIGNANT NEOPLASM OF NIPPLE AND AREOLA, RIGHT FEMALE BREAST: ICD-10-CM

## 2019-08-16 DIAGNOSIS — C50.819 MALIGNANT NEOPLASM OF OVERLAPPING SITES OF UNSPECIFIED FEMALE BREAST: ICD-10-CM

## 2019-08-16 DIAGNOSIS — Z98.89 OTHER SPECIFIED POSTPROCEDURAL STATES: Chronic | ICD-10-CM

## 2019-08-16 DIAGNOSIS — Z45.2 ENCOUNTER FOR ADJUSTMENT AND MANAGEMENT OF VASCULAR ACCESS DEVICE: Chronic | ICD-10-CM

## 2019-08-16 PROCEDURE — 78472 GATED HEART PLANAR SINGLE: CPT | Mod: 26

## 2019-08-16 PROCEDURE — 78472 GATED HEART PLANAR SINGLE: CPT

## 2019-08-16 PROCEDURE — A9560: CPT

## 2019-10-01 ENCOUNTER — APPOINTMENT (OUTPATIENT)
Dept: CARDIOLOGY | Facility: CLINIC | Age: 74
End: 2019-10-01
Payer: COMMERCIAL

## 2019-10-01 ENCOUNTER — NON-APPOINTMENT (OUTPATIENT)
Age: 74
End: 2019-10-01

## 2019-10-01 VITALS
SYSTOLIC BLOOD PRESSURE: 140 MMHG | BODY MASS INDEX: 21.38 KG/M2 | WEIGHT: 133 LBS | OXYGEN SATURATION: 100 % | HEART RATE: 86 BPM | RESPIRATION RATE: 18 BRPM | DIASTOLIC BLOOD PRESSURE: 68 MMHG | HEIGHT: 66 IN

## 2019-10-01 PROCEDURE — 93000 ELECTROCARDIOGRAM COMPLETE: CPT

## 2019-10-01 PROCEDURE — 99215 OFFICE O/P EST HI 40 MIN: CPT

## 2019-10-01 NOTE — DISCUSSION/SUMMARY
[Patient] : the patient [Risks] : risks [Benefits] : benefits [Alternatives] : alternatives [___ Month(s)] : [unfilled] month(s) [With Me] : with me [FreeTextEntry1] : This is a 73 year old woman with history of breast ca, stage metastatic to LNs, history of unprovoked DVT, here for Pre-operative cardiovascular risk evaluation and management for left hip surgery\par 1) encounter for cardiotoxic therapeutic agents : July 2019: MUGA july 2019:  result not available. \par repeat echo with strain imaging. \par 3) dehydration: resolved. \par 4) hypertension : restarted amlodipine 2.5 mg daily ; in future quintin beardh to coreg. \par 5) deep vein thrombosis : unprovoked DVT; ct xarelto PPX dose, 10 mg daily.\par \par

## 2019-10-01 NOTE — PHYSICAL EXAM
[General Appearance - Well Developed] : well developed [Normal Appearance] : normal appearance [Well Groomed] : well groomed [General Appearance - Well Nourished] : well nourished [No Deformities] : no deformities [General Appearance - In No Acute Distress] : no acute distress [Normal Conjunctiva] : the conjunctiva exhibited no abnormalities [Eyelids - No Xanthelasma] : the eyelids demonstrated no xanthelasmas [Normal Oral Mucosa] : normal oral mucosa [No Oral Pallor] : no oral pallor [No Oral Cyanosis] : no oral cyanosis [Normal Jugular Venous A Waves Present] : normal jugular venous A waves present [Normal Jugular Venous V Waves Present] : normal jugular venous V waves present [No Jugular Venous Bridges A Waves] : no jugular venous bridges A waves [Respiration, Rhythm And Depth] : normal respiratory rhythm and effort [Exaggerated Use Of Accessory Muscles For Inspiration] : no accessory muscle use [Auscultation Breath Sounds / Voice Sounds] : lungs were clear to auscultation bilaterally [Heart Rate And Rhythm] : heart rate and rhythm were normal [Heart Sounds] : normal S1 and S2 [Murmurs] : no murmurs present [Abdomen Soft] : soft [Abdomen Tenderness] : non-tender [Abdomen Mass (___ Cm)] : no abdominal mass palpated [Nail Clubbing] : no clubbing of the fingernails [Cyanosis, Localized] : no localized cyanosis [Petechial Hemorrhages (___cm)] : no petechial hemorrhages [Skin Color & Pigmentation] : normal skin color and pigmentation [] : no rash [No Venous Stasis] : no venous stasis [Skin Lesions] : no skin lesions [No Skin Ulcers] : no skin ulcer [No Xanthoma] : no  xanthoma was observed [FreeTextEntry1] : poor skin tugor. dehydrated  [Oriented To Time, Place, And Person] : oriented to person, place, and time [Affect] : the affect was normal [Mood] : the mood was normal [No Anxiety] : not feeling anxious

## 2019-10-01 NOTE — CARDIOLOGY SUMMARY
[No Symptoms] : no Symptoms [___] : [unfilled] [LVEF ___%] : LVEF [unfilled]% [___] : [unfilled] [Normal] : normal LA size [None] : no mitral regurgitation [de-identified] : 1/8/2019 [FreeTextEntry2] : jan 2019 LVEF 70%. GLS = -20%. no significant valvular abnormality  [de-identified] : MUGA scan: 3/14/2019:LVEf 63%

## 2019-10-01 NOTE — HISTORY OF PRESENT ILLNESS
[FreeTextEntry1] : encounter for monitoring of cardiotoxic chemotherapy,\par \par patient was sick and dehydration and had diarrhea and was dehydration.  she got a lot of fluids. \par Iv fluids. \par \par old note: patient. tolerated hip replacement well.No cardia complications.\par no chest pain. no dyspnea. no Le edema. \par she got physical therapy. no palptiaitons. no dizziness. no syncope,.

## 2019-10-04 ENCOUNTER — APPOINTMENT (OUTPATIENT)
Dept: CARDIOLOGY | Facility: CLINIC | Age: 74
End: 2019-10-04
Payer: COMMERCIAL

## 2019-10-04 PROCEDURE — 93306 TTE W/DOPPLER COMPLETE: CPT

## 2019-11-05 ENCOUNTER — TRANSCRIPTION ENCOUNTER (OUTPATIENT)
Age: 74
End: 2019-11-05

## 2020-01-01 ENCOUNTER — APPOINTMENT (OUTPATIENT)
Dept: DISASTER EMERGENCY | Facility: CLINIC | Age: 75
End: 2020-01-01

## 2020-01-01 ENCOUNTER — NON-APPOINTMENT (OUTPATIENT)
Age: 75
End: 2020-01-01

## 2020-01-01 ENCOUNTER — APPOINTMENT (OUTPATIENT)
Dept: INTERVENTIONAL RADIOLOGY/VASCULAR | Facility: CLINIC | Age: 75
End: 2020-01-01
Payer: COMMERCIAL

## 2020-01-01 ENCOUNTER — APPOINTMENT (OUTPATIENT)
Dept: CARDIOLOGY | Facility: CLINIC | Age: 75
End: 2020-01-01
Payer: COMMERCIAL

## 2020-01-01 ENCOUNTER — APPOINTMENT (OUTPATIENT)
Dept: MRI IMAGING | Facility: CLINIC | Age: 75
End: 2020-01-01
Payer: COMMERCIAL

## 2020-01-01 ENCOUNTER — OUTPATIENT (OUTPATIENT)
Dept: OUTPATIENT SERVICES | Facility: HOSPITAL | Age: 75
LOS: 1 days | End: 2020-01-01

## 2020-01-01 ENCOUNTER — OUTPATIENT (OUTPATIENT)
Dept: OUTPATIENT SERVICES | Facility: HOSPITAL | Age: 75
LOS: 1 days | End: 2020-01-01
Payer: COMMERCIAL

## 2020-01-01 VITALS
BODY MASS INDEX: 22.6 KG/M2 | OXYGEN SATURATION: 96 % | HEART RATE: 80 BPM | DIASTOLIC BLOOD PRESSURE: 74 MMHG | TEMPERATURE: 97.8 F | WEIGHT: 140 LBS | SYSTOLIC BLOOD PRESSURE: 126 MMHG

## 2020-01-01 DIAGNOSIS — Z01.818 ENCOUNTER FOR OTHER PREPROCEDURAL EXAMINATION: ICD-10-CM

## 2020-01-01 DIAGNOSIS — Z98.89 OTHER SPECIFIED POSTPROCEDURAL STATES: Chronic | ICD-10-CM

## 2020-01-01 DIAGNOSIS — Z45.2 ENCOUNTER FOR ADJUSTMENT AND MANAGEMENT OF VASCULAR ACCESS DEVICE: Chronic | ICD-10-CM

## 2020-01-01 DIAGNOSIS — Z00.8 ENCOUNTER FOR OTHER GENERAL EXAMINATION: ICD-10-CM

## 2020-01-01 LAB — SARS-COV-2 N GENE NPH QL NAA+PROBE: NOT DETECTED

## 2020-01-01 PROCEDURE — 99072 ADDL SUPL MATRL&STAF TM PHE: CPT

## 2020-01-01 PROCEDURE — A9585: CPT

## 2020-01-01 PROCEDURE — 93880 EXTRACRANIAL BILAT STUDY: CPT

## 2020-01-01 PROCEDURE — 70553 MRI BRAIN STEM W/O & W/DYE: CPT | Mod: 26

## 2020-01-01 PROCEDURE — 93000 ELECTROCARDIOGRAM COMPLETE: CPT

## 2020-01-01 PROCEDURE — 99214 OFFICE O/P EST MOD 30 MIN: CPT

## 2020-01-01 PROCEDURE — 70553 MRI BRAIN STEM W/O & W/DYE: CPT

## 2020-01-01 PROCEDURE — 36598 INJ W/FLUOR EVAL CV DEVICE: CPT

## 2020-01-01 RX ORDER — TUCATINIB 150 MG/1
150 TABLET ORAL TWICE DAILY
Refills: 0 | Status: DISCONTINUED | COMMUNITY
End: 2020-01-01

## 2020-01-01 RX ORDER — CYCLOBENZAPRINE HYDROCHLORIDE 5 MG/1
5 TABLET, FILM COATED ORAL 3 TIMES DAILY
Refills: 0 | Status: DISCONTINUED | COMMUNITY
End: 2020-01-01

## 2020-01-01 RX ORDER — ONDANSETRON 8 MG/1
8 TABLET ORAL
Qty: 10 | Refills: 2 | Status: ACTIVE | COMMUNITY
Start: 2020-05-26

## 2020-01-01 RX ORDER — CAPECITABINE 500 MG/1
TABLET ORAL
Refills: 0 | Status: DISCONTINUED | COMMUNITY
End: 2020-01-01

## 2020-01-01 RX ORDER — DOCUSATE SODIUM AND SENNOSIDES 50; 8.6 MG/1; MG/1
8.6-5 TABLET, FILM COATED ORAL
Refills: 0 | Status: DISCONTINUED | COMMUNITY
End: 2020-01-01

## 2020-01-01 RX ORDER — CHOLECALCIFEROL (VITAMIN D3) 1250 MCG
1.25 MG CAPSULE ORAL WEEKLY
Refills: 0 | Status: ACTIVE | COMMUNITY
Start: 2020-01-01

## 2020-01-01 RX ORDER — SODIUM PHOSPHATE, DIBASIC, ANHYDROUS, POTASSIUM PHOSPHATE, MONOBASIC, AND SODIUM PHOSPHATE, MONOBASIC, MONOHYDRATE 852; 155; 130 MG/1; MG/1; MG/1
155-852-130 TABLET, COATED ORAL
Qty: 3 | Refills: 0 | Status: DISCONTINUED | COMMUNITY
Start: 2020-06-25

## 2020-01-01 RX ORDER — CARVEDILOL 12.5 MG/1
12.5 TABLET, FILM COATED ORAL
Qty: 60 | Refills: 0 | Status: DISCONTINUED | COMMUNITY
Start: 2020-06-13

## 2020-01-01 RX ORDER — CARVEDILOL 3.12 MG/1
3.12 TABLET, FILM COATED ORAL TWICE DAILY
Qty: 60 | Refills: 3 | Status: DISCONTINUED | COMMUNITY
Start: 2020-01-14 | End: 2020-01-01

## 2020-01-01 RX ORDER — CAPECITABINE 150 MG/1
150 TABLET, FILM COATED ORAL
Refills: 0 | Status: DISCONTINUED | COMMUNITY
End: 2020-01-01

## 2020-01-01 RX ORDER — PROCHLORPERAZINE MALEATE 10 MG/1
10 TABLET ORAL 3 TIMES DAILY
Refills: 0 | Status: ACTIVE | COMMUNITY
Start: 2020-10-14

## 2020-01-01 RX ORDER — ACETAMINOPHEN 325 MG/1
325 TABLET ORAL EVERY 8 HOURS
Refills: 0 | Status: DISCONTINUED | COMMUNITY
End: 2020-01-01

## 2020-01-01 RX ORDER — CARVEDILOL 6.25 MG/1
6.25 TABLET, FILM COATED ORAL
Qty: 60 | Refills: 0 | Status: DISCONTINUED | COMMUNITY
Start: 2020-09-14

## 2020-01-09 ENCOUNTER — OTHER (OUTPATIENT)
Age: 75
End: 2020-01-09

## 2020-01-14 ENCOUNTER — APPOINTMENT (OUTPATIENT)
Dept: CARDIOLOGY | Facility: CLINIC | Age: 75
End: 2020-01-14
Payer: COMMERCIAL

## 2020-01-14 ENCOUNTER — NON-APPOINTMENT (OUTPATIENT)
Age: 75
End: 2020-01-14

## 2020-01-14 VITALS
OXYGEN SATURATION: 97 % | HEIGHT: 66 IN | WEIGHT: 148 LBS | HEART RATE: 82 BPM | BODY MASS INDEX: 23.78 KG/M2 | SYSTOLIC BLOOD PRESSURE: 120 MMHG | DIASTOLIC BLOOD PRESSURE: 72 MMHG

## 2020-01-14 PROCEDURE — 99215 OFFICE O/P EST HI 40 MIN: CPT

## 2020-01-14 PROCEDURE — 93000 ELECTROCARDIOGRAM COMPLETE: CPT

## 2020-01-14 RX ORDER — POTASSIUM CHLORIDE 1500 MG/1
20 TABLET, EXTENDED RELEASE ORAL
Refills: 0 | Status: DISCONTINUED | COMMUNITY
Start: 2018-04-24 | End: 2020-01-14

## 2020-01-14 NOTE — CARDIOLOGY SUMMARY
[No Symptoms] : no Symptoms [___] : [unfilled] [LVEF ___%] : LVEF [unfilled]% [Normal] : normal LA size [None] : no mitral regurgitation [de-identified] : 1/8/2019 [FreeTextEntry2] : jan 2019 LVEF 70%. GLS = -20%. no significant valvular abnormality  [de-identified] : MUGA scan: 3/14/2019:LVEf 63%

## 2020-01-14 NOTE — HISTORY OF PRESENT ILLNESS
[FreeTextEntry1] : encounter for monitoring of cardiotoxic chemotherapy,\par \par HPI for today: : july she had shuingles and her nerve pain is better. her hiop feels better. she had hip repalcemenrt in jan 2020,. \par no chest pain. no dyspnea,. dehdyraion better. occasional dirrhea.\par no dizziness,. no syncope. + lighthedadenss last week.\par gained weight. \par \par \par old note: patient was sick and dehydration and had diarrhea and was dehydration.  she got a lot of fluids. \par Iv fluids. \par \par old note: patient. tolerated hip replacement well.No cardia complications.\par no chest pain. no dyspnea. no Le edema. \par she got physical therapy. no palptiaitons. no dizziness. no syncope,.

## 2020-01-14 NOTE — DISCUSSION/SUMMARY
[Patient] : the patient [Risks] : risks [Alternatives] : alternatives [Benefits] : benefits [With Me] : with me [___ Month(s)] : [unfilled] month(s) [FreeTextEntry1] : This is a 73 year old woman with history of breast ca, stage metastatic to LNs, history of unprovoked DVT, here for Pre-operative cardiovascular risk evaluation and management for left hip surgery\par 1) encounter for cardiotoxic therapeutic agents : July 2019: MUGA july 2019:  \par  need repeat echo . oncologist needs another EF for insurance and xeloda (capcitabine) , herceptin. infusion,. \par 3) dehydration: resolved. \par 4) hypertension :  chagne amldiopinr to coreg 12.5 Q12.   stop amldiop;ine. will kendall coreg as it may be prophylactically prevnt drug induced cardiotoxicity. \par 5) deep vein thrombosis : unprovoked DVT; ct xarelto PPX dose, 10 mg daily.\par \par

## 2020-01-14 NOTE — PHYSICAL EXAM
[General Appearance - Well Developed] : well developed [Normal Appearance] : normal appearance [General Appearance - Well Nourished] : well nourished [Well Groomed] : well groomed [No Deformities] : no deformities [General Appearance - In No Acute Distress] : no acute distress [Normal Conjunctiva] : the conjunctiva exhibited no abnormalities [Eyelids - No Xanthelasma] : the eyelids demonstrated no xanthelasmas [Normal Oral Mucosa] : normal oral mucosa [No Oral Pallor] : no oral pallor [No Oral Cyanosis] : no oral cyanosis [Normal Jugular Venous V Waves Present] : normal jugular venous V waves present [Normal Jugular Venous A Waves Present] : normal jugular venous A waves present [No Jugular Venous Bridges A Waves] : no jugular venous bridges A waves [Respiration, Rhythm And Depth] : normal respiratory rhythm and effort [Exaggerated Use Of Accessory Muscles For Inspiration] : no accessory muscle use [Auscultation Breath Sounds / Voice Sounds] : lungs were clear to auscultation bilaterally [Heart Rate And Rhythm] : heart rate and rhythm were normal [Heart Sounds] : normal S1 and S2 [Murmurs] : no murmurs present [Abdomen Soft] : soft [Abdomen Tenderness] : non-tender [Abdomen Mass (___ Cm)] : no abdominal mass palpated [Cyanosis, Localized] : no localized cyanosis [Nail Clubbing] : no clubbing of the fingernails [Petechial Hemorrhages (___cm)] : no petechial hemorrhages [Skin Color & Pigmentation] : normal skin color and pigmentation [] : no rash [No Venous Stasis] : no venous stasis [Skin Lesions] : no skin lesions [No Xanthoma] : no  xanthoma was observed [No Skin Ulcers] : no skin ulcer [Affect] : the affect was normal [Oriented To Time, Place, And Person] : oriented to person, place, and time [Mood] : the mood was normal [No Anxiety] : not feeling anxious [FreeTextEntry1] : poor skin tugor. dehydrated

## 2020-01-16 ENCOUNTER — APPOINTMENT (OUTPATIENT)
Dept: ORTHOPEDIC SURGERY | Facility: CLINIC | Age: 75
End: 2020-01-16
Payer: COMMERCIAL

## 2020-01-16 VITALS
BODY MASS INDEX: 23.78 KG/M2 | WEIGHT: 148 LBS | HEART RATE: 78 BPM | DIASTOLIC BLOOD PRESSURE: 74 MMHG | SYSTOLIC BLOOD PRESSURE: 111 MMHG | HEIGHT: 66 IN

## 2020-01-16 PROCEDURE — 73502 X-RAY EXAM HIP UNI 2-3 VIEWS: CPT | Mod: LT

## 2020-01-16 PROCEDURE — 99213 OFFICE O/P EST LOW 20 MIN: CPT

## 2020-01-16 NOTE — PHYSICAL EXAM
[de-identified] : The patient appears well nourished  and in no apparent distress.  The patient is alert and oriented to person, place, and time.   Affect and mood appear normal. The head is normocephalic and atraumatic.  The eyes reveal normal sclera and extra ocular muscles are intact. The tongue is midline with no apparent lesions.  Skin shows normal turgor with no evidence of eczema or psoriasis.  No respiratory distress noted.  Sensation grossly intact.\par   [de-identified] : Exam of the left hip shows a well healed incision. SLR without difficulty, hip external rotation of 30 degrees, internal rotation of 20 degrees. 5/5 motor strength bilaterally distally. Sensation intact distally. LFCN intact.  [de-identified] : Imaging: Xray- AP pelvis and 2 views of the left hip shows a hip antibiotic cement spacer in stable position, without sign of fracture or dislocation.

## 2020-01-16 NOTE — ADDENDUM
[FreeTextEntry1] : This note was authored by Floyd Stringer working as a medical scribe for Dr. Mahendra Mora. The note was reviewed, edited, and revised by Dr. Mahendra Mora whom is in agreement with the exam findings, imaging findings, and treatment plan. 01/16/2020.

## 2020-01-16 NOTE — DISCUSSION/SUMMARY
[de-identified] : The patient is a 74 year old female 1 year s/p left posterior THR Prostalac antibiotic cement spacer.  The spacer remains in stable position and she is not having pain.  She is still undergoing chemotherapy.  Our plan is to maintain the spacer indefinitely.  She may continue weightbearing as tolerated.  She was advised to obtain a shoe lift in the left side between a quarter and a third of an inch.  She was also given the name of our orthotist to have a shoe lift built onto the shoe if this is not enough.  She is overall very thankful for the surgery and happy with the outcome.  I would like to see her back in 1 year for follow-up

## 2020-01-16 NOTE — HISTORY OF PRESENT ILLNESS
[de-identified] : The patient is a 74 year old female 1 year s/p left posterior THR, Prostalac antibiotic cement spacer. She is ambulating and transferring well without assistive devices, but reports an occasional limp due to weakness. She has returned to daily activities of life without significant pain or difficulty. Overall, she is very happy with the results of the surgery.  She does feel that her left leg is shorter than the right side.  This may be contributing to her limp.  She is still undergoing chemotherapy.  She reports that over the next 2 months she is having follow-up scans.  So far she says that she is doing a bit better.

## 2020-01-21 ENCOUNTER — APPOINTMENT (OUTPATIENT)
Dept: CARDIOLOGY | Facility: CLINIC | Age: 75
End: 2020-01-21
Payer: COMMERCIAL

## 2020-01-21 PROCEDURE — 93356 MYOCRD STRAIN IMG SPCKL TRCK: CPT

## 2020-01-21 PROCEDURE — 93306 TTE W/DOPPLER COMPLETE: CPT

## 2020-01-24 ENCOUNTER — OUTPATIENT (OUTPATIENT)
Dept: OUTPATIENT SERVICES | Facility: HOSPITAL | Age: 75
LOS: 1 days | End: 2020-01-24
Payer: COMMERCIAL

## 2020-01-24 ENCOUNTER — APPOINTMENT (OUTPATIENT)
Dept: MAMMOGRAPHY | Facility: CLINIC | Age: 75
End: 2020-01-24
Payer: COMMERCIAL

## 2020-01-24 ENCOUNTER — APPOINTMENT (OUTPATIENT)
Dept: ULTRASOUND IMAGING | Facility: CLINIC | Age: 75
End: 2020-01-24
Payer: COMMERCIAL

## 2020-01-24 DIAGNOSIS — R92.8 OTHER ABNORMAL AND INCONCLUSIVE FINDINGS ON DIAGNOSTIC IMAGING OF BREAST: ICD-10-CM

## 2020-01-24 DIAGNOSIS — Z45.2 ENCOUNTER FOR ADJUSTMENT AND MANAGEMENT OF VASCULAR ACCESS DEVICE: Chronic | ICD-10-CM

## 2020-01-24 DIAGNOSIS — Z98.89 OTHER SPECIFIED POSTPROCEDURAL STATES: Chronic | ICD-10-CM

## 2020-01-24 PROCEDURE — 77065 DX MAMMO INCL CAD UNI: CPT

## 2020-01-24 PROCEDURE — 77065 DX MAMMO INCL CAD UNI: CPT | Mod: 26,LT

## 2020-01-24 PROCEDURE — G0279: CPT | Mod: 26

## 2020-01-24 PROCEDURE — G0279: CPT

## 2020-01-24 PROCEDURE — 76641 ULTRASOUND BREAST COMPLETE: CPT

## 2020-01-24 PROCEDURE — 76641 ULTRASOUND BREAST COMPLETE: CPT | Mod: 26,50

## 2020-04-09 PROBLEM — M25.559 HIP PAIN: Status: ACTIVE | Noted: 2018-07-31

## 2020-04-22 ENCOUNTER — APPOINTMENT (OUTPATIENT)
Dept: NUCLEAR MEDICINE | Facility: CLINIC | Age: 75
End: 2020-04-22
Payer: COMMERCIAL

## 2020-04-22 ENCOUNTER — OUTPATIENT (OUTPATIENT)
Dept: OUTPATIENT SERVICES | Facility: HOSPITAL | Age: 75
LOS: 1 days | End: 2020-04-22

## 2020-04-22 DIAGNOSIS — Z45.2 ENCOUNTER FOR ADJUSTMENT AND MANAGEMENT OF VASCULAR ACCESS DEVICE: Chronic | ICD-10-CM

## 2020-04-22 DIAGNOSIS — C50.819 MALIGNANT NEOPLASM OF OVERLAPPING SITES OF UNSPECIFIED FEMALE BREAST: ICD-10-CM

## 2020-04-22 DIAGNOSIS — Z98.89 OTHER SPECIFIED POSTPROCEDURAL STATES: Chronic | ICD-10-CM

## 2020-04-22 PROCEDURE — 78472 GATED HEART PLANAR SINGLE: CPT | Mod: 26

## 2020-05-11 ENCOUNTER — OUTPATIENT (OUTPATIENT)
Dept: OUTPATIENT SERVICES | Facility: HOSPITAL | Age: 75
LOS: 1 days | End: 2020-05-11

## 2020-05-11 ENCOUNTER — APPOINTMENT (OUTPATIENT)
Dept: NUCLEAR MEDICINE | Facility: CLINIC | Age: 75
End: 2020-05-11
Payer: COMMERCIAL

## 2020-05-11 DIAGNOSIS — Z98.89 OTHER SPECIFIED POSTPROCEDURAL STATES: Chronic | ICD-10-CM

## 2020-05-11 DIAGNOSIS — Z45.2 ENCOUNTER FOR ADJUSTMENT AND MANAGEMENT OF VASCULAR ACCESS DEVICE: Chronic | ICD-10-CM

## 2020-05-11 DIAGNOSIS — C50.819 MALIGNANT NEOPLASM OF OVERLAPPING SITES OF UNSPECIFIED FEMALE BREAST: ICD-10-CM

## 2020-05-11 PROCEDURE — 78815 PET IMAGE W/CT SKULL-THIGH: CPT | Mod: 26,PS

## 2020-07-14 ENCOUNTER — NON-APPOINTMENT (OUTPATIENT)
Age: 75
End: 2020-07-14

## 2020-07-14 ENCOUNTER — APPOINTMENT (OUTPATIENT)
Dept: CARDIOLOGY | Facility: CLINIC | Age: 75
End: 2020-07-14
Payer: COMMERCIAL

## 2020-07-14 VITALS
TEMPERATURE: 98.2 F | WEIGHT: 150 LBS | HEIGHT: 66 IN | SYSTOLIC BLOOD PRESSURE: 118 MMHG | DIASTOLIC BLOOD PRESSURE: 61 MMHG | OXYGEN SATURATION: 96 % | HEART RATE: 67 BPM | BODY MASS INDEX: 24.11 KG/M2

## 2020-07-14 PROCEDURE — 93000 ELECTROCARDIOGRAM COMPLETE: CPT

## 2020-07-14 PROCEDURE — 99215 OFFICE O/P EST HI 40 MIN: CPT

## 2020-07-14 RX ORDER — LAPATINIB 250 MG/1
250 TABLET ORAL
Refills: 0 | Status: DISCONTINUED | COMMUNITY
End: 2020-07-14

## 2020-07-14 RX ORDER — RIVAROXABAN 10 MG/1
10 TABLET, FILM COATED ORAL
Refills: 0 | Status: DISCONTINUED | COMMUNITY
Start: 2018-11-09 | End: 2020-07-14

## 2020-07-14 NOTE — DISCUSSION/SUMMARY
[Patient] : the patient [Benefits] : benefits [Risks] : risks [___ Month(s)] : [unfilled] month(s) [Alternatives] : alternatives [With Me] : with me [FreeTextEntry1] : This is a 73 year old woman with history of breast ca, stage metastatic to LNs, history of unprovoked DVT, here for Pre-operative cardiovascular risk evaluation and management for left hip surgery\par 1) encounter for cardiotoxic therapeutic agents : repeat echo with strain imaging. 2d and 3D echo with strain. reduce coreg to 6.25 mg Q daily  (from 12.5 )  A BP and heart rate check in 4 weeks. Nurse visit o\par 2) hypertension :  coreg. prophylactically prevnt drug induced cardiotoxicity. \par 3) deep vein thrombosis upper arm. : unprovoked DVT;  resolved  . off anticoagulation as per hematologist. \par \par \par

## 2020-07-14 NOTE — HISTORY OF PRESENT ILLNESS
[FreeTextEntry1] : encounter for monitoring of cardiotoxic chemotherapy,\par \par HPI for today:  patient on tukysa (tucatinib) her2 antagonist. Started recently. there is interaction with xrarelto and coreg. Increases levels for those meds\par Paitent stopped xarelto has it was due to right upper arm swelling.\par no dizziness.\par no headacehs. \par \par  old note: july she had shuingles and her nerve pain is better. her hiop feels better. she had hip repalcemenrt in jan 2020,. \par no chest pain. no dyspnea,. dehdyraion better. occasional dirrhea.\par no dizziness,. no syncope. + lighthedadenss last week.\par gained weight. \par \par \par old note: patient was sick and dehydration and had diarrhea and was dehydration.  she got a lot of fluids. \par Iv fluids. \par \par old note: patient. tolerated hip replacement well.No cardia complications.\par no chest pain. no dyspnea. no Le edema. \par she got physical therapy. no palptiaitons. no dizziness. no syncope,.

## 2020-07-14 NOTE — PHYSICAL EXAM
[Normal Appearance] : normal appearance [General Appearance - Well Developed] : well developed [General Appearance - Well Nourished] : well nourished [Well Groomed] : well groomed [No Deformities] : no deformities [General Appearance - In No Acute Distress] : no acute distress [Normal Conjunctiva] : the conjunctiva exhibited no abnormalities [No Oral Pallor] : no oral pallor [Normal Oral Mucosa] : normal oral mucosa [Eyelids - No Xanthelasma] : the eyelids demonstrated no xanthelasmas [Normal Jugular Venous A Waves Present] : normal jugular venous A waves present [No Oral Cyanosis] : no oral cyanosis [No Jugular Venous Bridges A Waves] : no jugular venous bridges A waves [Respiration, Rhythm And Depth] : normal respiratory rhythm and effort [Normal Jugular Venous V Waves Present] : normal jugular venous V waves present [Exaggerated Use Of Accessory Muscles For Inspiration] : no accessory muscle use [Auscultation Breath Sounds / Voice Sounds] : lungs were clear to auscultation bilaterally [Heart Sounds] : normal S1 and S2 [Heart Rate And Rhythm] : heart rate and rhythm were normal [Abdomen Tenderness] : non-tender [Abdomen Soft] : soft [Murmurs] : no murmurs present [Abdomen Mass (___ Cm)] : no abdominal mass palpated [Cyanosis, Localized] : no localized cyanosis [Nail Clubbing] : no clubbing of the fingernails [Petechial Hemorrhages (___cm)] : no petechial hemorrhages [Skin Color & Pigmentation] : normal skin color and pigmentation [] : no rash [No Skin Ulcers] : no skin ulcer [Skin Lesions] : no skin lesions [No Venous Stasis] : no venous stasis [No Xanthoma] : no  xanthoma was observed [Oriented To Time, Place, And Person] : oriented to person, place, and time [Affect] : the affect was normal [No Anxiety] : not feeling anxious [Mood] : the mood was normal [FreeTextEntry1] : poor skin tugor. dehydrated

## 2020-07-14 NOTE — CARDIOLOGY SUMMARY
[No Symptoms] : no Symptoms [LVEF ___%] : LVEF [unfilled]% [None] : no mitral regurgitation [Normal] : normal LA size [___] : [unfilled] [de-identified] : 1/8/2019 [FreeTextEntry2] : jan 2019 LVEF 70%. GLS = -20%. no significant valvular abnormality  [de-identified] : MUGA scan: 3/14/2019:LVEf 63%

## 2020-08-06 ENCOUNTER — APPOINTMENT (OUTPATIENT)
Dept: CARDIOLOGY | Facility: CLINIC | Age: 75
End: 2020-08-06
Payer: COMMERCIAL

## 2020-08-06 PROCEDURE — 93356 MYOCRD STRAIN IMG SPCKL TRCK: CPT

## 2020-08-06 PROCEDURE — 93306 TTE W/DOPPLER COMPLETE: CPT

## 2020-08-11 VITALS
RESPIRATION RATE: 16 BRPM | HEART RATE: 60 BPM | OXYGEN SATURATION: 96 % | DIASTOLIC BLOOD PRESSURE: 60 MMHG | SYSTOLIC BLOOD PRESSURE: 116 MMHG

## 2020-08-17 ENCOUNTER — TRANSCRIPTION ENCOUNTER (OUTPATIENT)
Age: 75
End: 2020-08-17

## 2020-08-20 ENCOUNTER — APPOINTMENT (OUTPATIENT)
Dept: CT IMAGING | Facility: CLINIC | Age: 75
End: 2020-08-20
Payer: COMMERCIAL

## 2020-08-20 ENCOUNTER — OUTPATIENT (OUTPATIENT)
Dept: OUTPATIENT SERVICES | Facility: HOSPITAL | Age: 75
LOS: 1 days | End: 2020-08-20

## 2020-08-20 DIAGNOSIS — Z00.8 ENCOUNTER FOR OTHER GENERAL EXAMINATION: ICD-10-CM

## 2020-08-20 DIAGNOSIS — Z45.2 ENCOUNTER FOR ADJUSTMENT AND MANAGEMENT OF VASCULAR ACCESS DEVICE: Chronic | ICD-10-CM

## 2020-08-20 DIAGNOSIS — Z98.89 OTHER SPECIFIED POSTPROCEDURAL STATES: Chronic | ICD-10-CM

## 2020-08-20 PROCEDURE — 74177 CT ABD & PELVIS W/CONTRAST: CPT | Mod: 26

## 2020-09-30 ENCOUNTER — APPOINTMENT (OUTPATIENT)
Dept: NUCLEAR MEDICINE | Facility: CLINIC | Age: 75
End: 2020-09-30
Payer: COMMERCIAL

## 2020-09-30 ENCOUNTER — OUTPATIENT (OUTPATIENT)
Dept: OUTPATIENT SERVICES | Facility: HOSPITAL | Age: 75
LOS: 1 days | End: 2020-09-30

## 2020-09-30 DIAGNOSIS — Z00.00 ENCOUNTER FOR GENERAL ADULT MEDICAL EXAMINATION WITHOUT ABNORMAL FINDINGS: ICD-10-CM

## 2020-09-30 DIAGNOSIS — Z98.89 OTHER SPECIFIED POSTPROCEDURAL STATES: Chronic | ICD-10-CM

## 2020-09-30 DIAGNOSIS — Z45.2 ENCOUNTER FOR ADJUSTMENT AND MANAGEMENT OF VASCULAR ACCESS DEVICE: Chronic | ICD-10-CM

## 2020-09-30 PROCEDURE — 78815 PET IMAGE W/CT SKULL-THIGH: CPT | Mod: 26,PS

## 2020-11-27 PROBLEM — Z01.818 PRE-OP TESTING: Status: ACTIVE | Noted: 2020-01-01

## 2021-01-01 ENCOUNTER — APPOINTMENT (OUTPATIENT)
Dept: MRI IMAGING | Facility: CLINIC | Age: 76
End: 2021-01-01
Payer: COMMERCIAL

## 2021-01-01 ENCOUNTER — APPOINTMENT (OUTPATIENT)
Dept: CARDIOLOGY | Facility: CLINIC | Age: 76
End: 2021-01-01
Payer: COMMERCIAL

## 2021-01-01 ENCOUNTER — INPATIENT (INPATIENT)
Facility: HOSPITAL | Age: 76
LOS: 5 days | Discharge: HOME | DRG: 91 | End: 2021-09-29
Attending: HOSPITALIST | Admitting: HOSPITALIST
Payer: MEDICARE

## 2021-01-01 ENCOUNTER — NON-APPOINTMENT (OUTPATIENT)
Age: 76
End: 2021-01-01

## 2021-01-01 ENCOUNTER — OUTPATIENT (OUTPATIENT)
Dept: OUTPATIENT SERVICES | Facility: HOSPITAL | Age: 76
LOS: 1 days | End: 2021-01-01
Payer: COMMERCIAL

## 2021-01-01 ENCOUNTER — OUTPATIENT (OUTPATIENT)
Dept: OUTPATIENT SERVICES | Facility: HOSPITAL | Age: 76
LOS: 1 days | End: 2021-01-01

## 2021-01-01 ENCOUNTER — APPOINTMENT (OUTPATIENT)
Dept: NUCLEAR MEDICINE | Facility: CLINIC | Age: 76
End: 2021-01-01
Payer: COMMERCIAL

## 2021-01-01 ENCOUNTER — TRANSCRIPTION ENCOUNTER (OUTPATIENT)
Age: 76
End: 2021-01-01

## 2021-01-01 ENCOUNTER — EMERGENCY (EMERGENCY)
Facility: HOSPITAL | Age: 76
LOS: 1 days | Discharge: HOME | End: 2021-01-01
Attending: EMERGENCY MEDICINE
Payer: COMMERCIAL

## 2021-01-01 ENCOUNTER — RESULT REVIEW (OUTPATIENT)
Age: 76
End: 2021-01-01

## 2021-01-01 ENCOUNTER — APPOINTMENT (OUTPATIENT)
Dept: ORTHOPEDIC SURGERY | Facility: CLINIC | Age: 76
End: 2021-01-01
Payer: COMMERCIAL

## 2021-01-01 ENCOUNTER — RX RENEWAL (OUTPATIENT)
Age: 76
End: 2021-01-01

## 2021-01-01 VITALS
TEMPERATURE: 100 F | WEIGHT: 139.99 LBS | HEIGHT: 66 IN | RESPIRATION RATE: 20 BRPM | OXYGEN SATURATION: 95 % | HEART RATE: 81 BPM | SYSTOLIC BLOOD PRESSURE: 119 MMHG | DIASTOLIC BLOOD PRESSURE: 69 MMHG

## 2021-01-01 VITALS
WEIGHT: 140 LBS | BODY MASS INDEX: 22.5 KG/M2 | SYSTOLIC BLOOD PRESSURE: 113 MMHG | HEART RATE: 76 BPM | HEIGHT: 66 IN | DIASTOLIC BLOOD PRESSURE: 75 MMHG

## 2021-01-01 VITALS
HEIGHT: 66 IN | WEIGHT: 139.99 LBS | SYSTOLIC BLOOD PRESSURE: 125 MMHG | HEART RATE: 86 BPM | RESPIRATION RATE: 18 BRPM | TEMPERATURE: 99 F | OXYGEN SATURATION: 95 % | DIASTOLIC BLOOD PRESSURE: 62 MMHG

## 2021-01-01 VITALS
OXYGEN SATURATION: 100 % | HEIGHT: 66 IN | HEART RATE: 74 BPM | SYSTOLIC BLOOD PRESSURE: 128 MMHG | BODY MASS INDEX: 22.5 KG/M2 | TEMPERATURE: 97 F | WEIGHT: 140 LBS | DIASTOLIC BLOOD PRESSURE: 72 MMHG

## 2021-01-01 VITALS
OXYGEN SATURATION: 96 % | SYSTOLIC BLOOD PRESSURE: 121 MMHG | DIASTOLIC BLOOD PRESSURE: 74 MMHG | TEMPERATURE: 98 F | HEART RATE: 99 BPM | RESPIRATION RATE: 18 BRPM

## 2021-01-01 VITALS
BODY MASS INDEX: 22.66 KG/M2 | HEART RATE: 85 BPM | RESPIRATION RATE: 17 BRPM | DIASTOLIC BLOOD PRESSURE: 53 MMHG | HEIGHT: 66 IN | OXYGEN SATURATION: 96 % | WEIGHT: 141 LBS | SYSTOLIC BLOOD PRESSURE: 102 MMHG

## 2021-01-01 VITALS
OXYGEN SATURATION: 97 % | DIASTOLIC BLOOD PRESSURE: 71 MMHG | SYSTOLIC BLOOD PRESSURE: 129 MMHG | TEMPERATURE: 98 F | RESPIRATION RATE: 18 BRPM | HEART RATE: 80 BPM

## 2021-01-01 DIAGNOSIS — Z98.89 OTHER SPECIFIED POSTPROCEDURAL STATES: Chronic | ICD-10-CM

## 2021-01-01 DIAGNOSIS — Z51.81 ENCOUNTER FOR THERAPEUTIC DRUG LVL MONITORING: ICD-10-CM

## 2021-01-01 DIAGNOSIS — I10 ESSENTIAL (PRIMARY) HYPERTENSION: ICD-10-CM

## 2021-01-01 DIAGNOSIS — Z79.899 ENCOUNTER FOR THERAPEUTIC DRUG LVL MONITORING: ICD-10-CM

## 2021-01-01 DIAGNOSIS — Z00.8 ENCOUNTER FOR OTHER GENERAL EXAMINATION: ICD-10-CM

## 2021-01-01 DIAGNOSIS — Z45.2 ENCOUNTER FOR ADJUSTMENT AND MANAGEMENT OF VASCULAR ACCESS DEVICE: Chronic | ICD-10-CM

## 2021-01-01 DIAGNOSIS — C50.819 MALIGNANT NEOPLASM OF OVERLAPPING SITES OF UNSPECIFIED FEMALE BREAST: ICD-10-CM

## 2021-01-01 DIAGNOSIS — R55 SYNCOPE AND COLLAPSE: ICD-10-CM

## 2021-01-01 DIAGNOSIS — E86.0 DEHYDRATION: ICD-10-CM

## 2021-01-01 DIAGNOSIS — J90 PLEURAL EFFUSION, NOT ELSEWHERE CLASSIFIED: ICD-10-CM

## 2021-01-01 DIAGNOSIS — Z96.642 PRESENCE OF LEFT ARTIFICIAL HIP JOINT: ICD-10-CM

## 2021-01-01 DIAGNOSIS — R50.9 FEVER, UNSPECIFIED: ICD-10-CM

## 2021-01-01 DIAGNOSIS — C79.31 SECONDARY MALIGNANT NEOPLASM OF BRAIN: ICD-10-CM

## 2021-01-01 DIAGNOSIS — R06.00 DYSPNEA, UNSPECIFIED: ICD-10-CM

## 2021-01-01 LAB
ALBUMIN SERPL ELPH-MCNC: 2.8 G/DL — LOW (ref 3.3–5.2)
ALBUMIN SERPL ELPH-MCNC: 2.9 G/DL — LOW (ref 3.3–5.2)
ALBUMIN SERPL ELPH-MCNC: 3.1 G/DL — LOW (ref 3.3–5.2)
ALBUMIN SERPL ELPH-MCNC: 3.2 G/DL — LOW (ref 3.3–5.2)
ALP SERPL-CCNC: 269 U/L — HIGH (ref 40–120)
ALP SERPL-CCNC: 281 U/L — HIGH (ref 40–120)
ALP SERPL-CCNC: 318 U/L — HIGH (ref 40–120)
ALP SERPL-CCNC: 319 U/L — HIGH (ref 40–120)
ALT FLD-CCNC: 108 U/L — HIGH
ALT FLD-CCNC: 111 U/L — HIGH
ALT FLD-CCNC: 83 U/L — HIGH
ALT FLD-CCNC: 90 U/L — HIGH
ANION GAP SERPL CALC-SCNC: 10 MMOL/L — SIGNIFICANT CHANGE UP (ref 5–17)
ANION GAP SERPL CALC-SCNC: 11 MMOL/L — SIGNIFICANT CHANGE UP (ref 5–17)
ANION GAP SERPL CALC-SCNC: 13 MMOL/L — SIGNIFICANT CHANGE UP (ref 5–17)
ANION GAP SERPL CALC-SCNC: 9 MMOL/L — SIGNIFICANT CHANGE UP (ref 5–17)
ANISOCYTOSIS BLD QL: SLIGHT — SIGNIFICANT CHANGE UP
APPEARANCE CSF: CLEAR — SIGNIFICANT CHANGE UP
APPEARANCE UR: CLEAR — SIGNIFICANT CHANGE UP
APTT BLD: 35.7 SEC — HIGH (ref 27.5–35.5)
AST SERPL-CCNC: 125 U/L — HIGH
AST SERPL-CCNC: 159 U/L — HIGH
AST SERPL-CCNC: 221 U/L — HIGH
AST SERPL-CCNC: 258 U/L — HIGH
BACTERIA # UR AUTO: ABNORMAL
BASOPHILS # BLD AUTO: 0 K/UL — SIGNIFICANT CHANGE UP (ref 0–0.2)
BASOPHILS # BLD AUTO: 0.01 K/UL — SIGNIFICANT CHANGE UP (ref 0–0.2)
BASOPHILS NFR BLD AUTO: 0 % — SIGNIFICANT CHANGE UP (ref 0–2)
BASOPHILS NFR BLD AUTO: 0.2 % — SIGNIFICANT CHANGE UP (ref 0–2)
BILIRUB SERPL-MCNC: 1.5 MG/DL — SIGNIFICANT CHANGE UP (ref 0.4–2)
BILIRUB SERPL-MCNC: 1.8 MG/DL — SIGNIFICANT CHANGE UP (ref 0.4–2)
BILIRUB SERPL-MCNC: 2.4 MG/DL — HIGH (ref 0.4–2)
BILIRUB SERPL-MCNC: 2.8 MG/DL — HIGH (ref 0.4–2)
BILIRUB UR-MCNC: NEGATIVE — SIGNIFICANT CHANGE UP
BLD GP AB SCN SERPL QL: SIGNIFICANT CHANGE UP
BUN SERPL-MCNC: 11.5 MG/DL — SIGNIFICANT CHANGE UP (ref 8–20)
BUN SERPL-MCNC: 11.7 MG/DL — SIGNIFICANT CHANGE UP (ref 8–20)
BUN SERPL-MCNC: 12.2 MG/DL — SIGNIFICANT CHANGE UP (ref 8–20)
BUN SERPL-MCNC: 15.1 MG/DL — SIGNIFICANT CHANGE UP (ref 8–20)
BUN SERPL-MCNC: 15.5 MG/DL — SIGNIFICANT CHANGE UP (ref 8–20)
BUN SERPL-MCNC: 21.4 MG/DL — HIGH (ref 8–20)
CALCIUM SERPL-MCNC: 9.2 MG/DL — SIGNIFICANT CHANGE UP (ref 8.6–10.2)
CALCIUM SERPL-MCNC: 9.3 MG/DL — SIGNIFICANT CHANGE UP (ref 8.6–10.2)
CALCIUM SERPL-MCNC: 9.4 MG/DL — SIGNIFICANT CHANGE UP (ref 8.6–10.2)
CALCIUM SERPL-MCNC: 9.6 MG/DL — SIGNIFICANT CHANGE UP (ref 8.6–10.2)
CALCIUM SERPL-MCNC: 9.7 MG/DL — SIGNIFICANT CHANGE UP (ref 8.6–10.2)
CALCIUM SERPL-MCNC: 9.9 MG/DL — SIGNIFICANT CHANGE UP (ref 8.6–10.2)
CHLORIDE SERPL-SCNC: 101 MMOL/L — SIGNIFICANT CHANGE UP (ref 98–107)
CHLORIDE SERPL-SCNC: 104 MMOL/L — SIGNIFICANT CHANGE UP (ref 98–107)
CHLORIDE SERPL-SCNC: 104 MMOL/L — SIGNIFICANT CHANGE UP (ref 98–107)
CHLORIDE SERPL-SCNC: 105 MMOL/L — SIGNIFICANT CHANGE UP (ref 98–107)
CHLORIDE SERPL-SCNC: 105 MMOL/L — SIGNIFICANT CHANGE UP (ref 98–107)
CHLORIDE SERPL-SCNC: 106 MMOL/L — SIGNIFICANT CHANGE UP (ref 98–107)
CO2 SERPL-SCNC: 24 MMOL/L — SIGNIFICANT CHANGE UP (ref 22–29)
CO2 SERPL-SCNC: 25 MMOL/L — SIGNIFICANT CHANGE UP (ref 22–29)
CO2 SERPL-SCNC: 26 MMOL/L — SIGNIFICANT CHANGE UP (ref 22–29)
CO2 SERPL-SCNC: 27 MMOL/L — SIGNIFICANT CHANGE UP (ref 22–29)
COLOR CSF: SIGNIFICANT CHANGE UP
COLOR SPEC: YELLOW — SIGNIFICANT CHANGE UP
COVID-19 SPIKE DOMAIN AB INTERP: POSITIVE
COVID-19 SPIKE DOMAIN ANTIBODY RESULT: >250 U/ML — HIGH
CREAT SERPL-MCNC: 0.52 MG/DL — SIGNIFICANT CHANGE UP (ref 0.5–1.3)
CREAT SERPL-MCNC: 0.52 MG/DL — SIGNIFICANT CHANGE UP (ref 0.5–1.3)
CREAT SERPL-MCNC: 0.59 MG/DL — SIGNIFICANT CHANGE UP (ref 0.5–1.3)
CREAT SERPL-MCNC: 0.6 MG/DL — SIGNIFICANT CHANGE UP (ref 0.5–1.3)
CREAT SERPL-MCNC: 0.73 MG/DL — SIGNIFICANT CHANGE UP (ref 0.5–1.3)
CREAT SERPL-MCNC: 0.78 MG/DL — SIGNIFICANT CHANGE UP (ref 0.5–1.3)
CSF PCR RESULT: SIGNIFICANT CHANGE UP
CULTURE RESULTS: NO GROWTH — SIGNIFICANT CHANGE UP
CULTURE RESULTS: SIGNIFICANT CHANGE UP
CULTURE RESULTS: SIGNIFICANT CHANGE UP
DIFF PNL FLD: NEGATIVE — SIGNIFICANT CHANGE UP
EOSINOPHIL # BLD AUTO: 0 K/UL — SIGNIFICANT CHANGE UP (ref 0–0.5)
EOSINOPHIL # BLD AUTO: 0.01 K/UL — SIGNIFICANT CHANGE UP (ref 0–0.5)
EOSINOPHIL # BLD AUTO: 0.02 K/UL — SIGNIFICANT CHANGE UP (ref 0–0.5)
EOSINOPHIL # BLD AUTO: 0.05 K/UL — SIGNIFICANT CHANGE UP (ref 0–0.5)
EOSINOPHIL NFR BLD AUTO: 0 % — SIGNIFICANT CHANGE UP (ref 0–6)
EOSINOPHIL NFR BLD AUTO: 0.2 % — SIGNIFICANT CHANGE UP (ref 0–6)
EOSINOPHIL NFR BLD AUTO: 0.4 % — SIGNIFICANT CHANGE UP (ref 0–6)
EOSINOPHIL NFR BLD AUTO: 0.9 % — SIGNIFICANT CHANGE UP (ref 0–6)
EPI CELLS # UR: SIGNIFICANT CHANGE UP
FERRITIN SERPL-MCNC: 1273 NG/ML — HIGH (ref 15–150)
FOLATE SERPL-MCNC: 6.8 NG/ML — SIGNIFICANT CHANGE UP
GLUCOSE CSF-MCNC: 92 MG/DLG/24H — HIGH (ref 40–70)
GLUCOSE SERPL-MCNC: 118 MG/DL — HIGH (ref 70–99)
GLUCOSE SERPL-MCNC: 142 MG/DL — HIGH (ref 70–99)
GLUCOSE SERPL-MCNC: 150 MG/DL — HIGH (ref 70–99)
GLUCOSE SERPL-MCNC: 185 MG/DL — HIGH (ref 70–99)
GLUCOSE SERPL-MCNC: 195 MG/DL — HIGH (ref 70–99)
GLUCOSE SERPL-MCNC: 89 MG/DL — SIGNIFICANT CHANGE UP (ref 70–99)
GLUCOSE UR QL: NEGATIVE MG/DL — SIGNIFICANT CHANGE UP
GRAM STN FLD: SIGNIFICANT CHANGE UP
GRAM STN FLD: SIGNIFICANT CHANGE UP
HCT VFR BLD CALC: 28 % — LOW (ref 34.5–45)
HCT VFR BLD CALC: 28.9 % — LOW (ref 34.5–45)
HCT VFR BLD CALC: 29.4 % — LOW (ref 34.5–45)
HCT VFR BLD CALC: 29.5 % — LOW (ref 34.5–45)
HCT VFR BLD CALC: 30 % — LOW (ref 34.5–45)
HCT VFR BLD CALC: 30.8 % — LOW (ref 34.5–45)
HCV AB S/CO SERPL IA: 0.14 S/CO — SIGNIFICANT CHANGE UP (ref 0–0.99)
HCV AB SERPL-IMP: SIGNIFICANT CHANGE UP
HGB BLD-MCNC: 10.3 G/DL — LOW (ref 11.5–15.5)
HGB BLD-MCNC: 9.4 G/DL — LOW (ref 11.5–15.5)
HGB BLD-MCNC: 9.6 G/DL — LOW (ref 11.5–15.5)
HGB BLD-MCNC: 9.7 G/DL — LOW (ref 11.5–15.5)
HGB BLD-MCNC: 9.9 G/DL — LOW (ref 11.5–15.5)
HGB BLD-MCNC: 9.9 G/DL — LOW (ref 11.5–15.5)
IMM GRANULOCYTES NFR BLD AUTO: 0.8 % — SIGNIFICANT CHANGE UP (ref 0–1.5)
IMM GRANULOCYTES NFR BLD AUTO: 0.9 % — SIGNIFICANT CHANGE UP (ref 0–1.5)
IMM GRANULOCYTES NFR BLD AUTO: 1 % — SIGNIFICANT CHANGE UP (ref 0–1.5)
INR BLD: 1.18 RATIO — HIGH (ref 0.88–1.16)
IRON SATN MFR SERPL: 10 % — LOW (ref 14–50)
IRON SATN MFR SERPL: 27 UG/DL — LOW (ref 37–145)
KETONES UR-MCNC: ABNORMAL
LDH CSF L TO P-CCNC: 37 U/L — SIGNIFICANT CHANGE UP
LDH FLD-CCNC: 37 U/L — SIGNIFICANT CHANGE UP
LEUKOCYTE ESTERASE UR-ACNC: ABNORMAL
LYMPHOCYTES # BLD AUTO: 0.28 K/UL — LOW (ref 1–3.3)
LYMPHOCYTES # BLD AUTO: 0.29 K/UL — LOW (ref 1–3.3)
LYMPHOCYTES # BLD AUTO: 0.49 K/UL — LOW (ref 1–3.3)
LYMPHOCYTES # BLD AUTO: 0.49 K/UL — LOW (ref 1–3.3)
LYMPHOCYTES # BLD AUTO: 5.2 % — LOW (ref 13–44)
LYMPHOCYTES # BLD AUTO: 5.5 % — LOW (ref 13–44)
LYMPHOCYTES # BLD AUTO: 7.9 % — LOW (ref 13–44)
LYMPHOCYTES # BLD AUTO: 9.1 % — LOW (ref 13–44)
MACROCYTES BLD QL: SLIGHT — SIGNIFICANT CHANGE UP
MAGNESIUM SERPL-MCNC: 1.6 MG/DL — SIGNIFICANT CHANGE UP (ref 1.6–2.6)
MAGNESIUM SERPL-MCNC: 1.8 MG/DL — SIGNIFICANT CHANGE UP (ref 1.8–2.6)
MANUAL SMEAR VERIFICATION: SIGNIFICANT CHANGE UP
MCHC RBC-ENTMCNC: 32.3 GM/DL — SIGNIFICANT CHANGE UP (ref 32–36)
MCHC RBC-ENTMCNC: 33.2 GM/DL — SIGNIFICANT CHANGE UP (ref 32–36)
MCHC RBC-ENTMCNC: 33.4 GM/DL — SIGNIFICANT CHANGE UP (ref 32–36)
MCHC RBC-ENTMCNC: 33.6 GM/DL — SIGNIFICANT CHANGE UP (ref 32–36)
MCHC RBC-ENTMCNC: 33.6 GM/DL — SIGNIFICANT CHANGE UP (ref 32–36)
MCHC RBC-ENTMCNC: 33.7 GM/DL — SIGNIFICANT CHANGE UP (ref 32–36)
MCHC RBC-ENTMCNC: 35.4 PG — HIGH (ref 27–34)
MCHC RBC-ENTMCNC: 36.4 PG — HIGH (ref 27–34)
MCHC RBC-ENTMCNC: 36.4 PG — HIGH (ref 27–34)
MCHC RBC-ENTMCNC: 36.7 PG — HIGH (ref 27–34)
MCHC RBC-ENTMCNC: 36.7 PG — HIGH (ref 27–34)
MCHC RBC-ENTMCNC: 37.2 PG — HIGH (ref 27–34)
MCV RBC AUTO: 108.5 FL — HIGH (ref 80–100)
MCV RBC AUTO: 109.3 FL — HIGH (ref 80–100)
MCV RBC AUTO: 109.5 FL — HIGH (ref 80–100)
MCV RBC AUTO: 109.5 FL — HIGH (ref 80–100)
MCV RBC AUTO: 109.6 FL — HIGH (ref 80–100)
MCV RBC AUTO: 110.5 FL — HIGH (ref 80–100)
METAMYELOCYTES # FLD: 0.9 % — HIGH (ref 0–0)
MICROCYTES BLD QL: SLIGHT — SIGNIFICANT CHANGE UP
MONOCYTES # BLD AUTO: 0.33 K/UL — SIGNIFICANT CHANGE UP (ref 0–0.9)
MONOCYTES # BLD AUTO: 0.58 K/UL — SIGNIFICANT CHANGE UP (ref 0–0.9)
MONOCYTES # BLD AUTO: 0.69 K/UL — SIGNIFICANT CHANGE UP (ref 0–0.9)
MONOCYTES # BLD AUTO: 0.89 K/UL — SIGNIFICANT CHANGE UP (ref 0–0.9)
MONOCYTES NFR BLD AUTO: 10.5 % — SIGNIFICANT CHANGE UP (ref 2–14)
MONOCYTES NFR BLD AUTO: 12.8 % — SIGNIFICANT CHANGE UP (ref 2–14)
MONOCYTES NFR BLD AUTO: 14.4 % — HIGH (ref 2–14)
MONOCYTES NFR BLD AUTO: 6.3 % — SIGNIFICANT CHANGE UP (ref 2–14)
NEUTROPHILS # BLD AUTO: 4.12 K/UL — SIGNIFICANT CHANGE UP (ref 1.8–7.4)
NEUTROPHILS # BLD AUTO: 4.47 K/UL — SIGNIFICANT CHANGE UP (ref 1.8–7.4)
NEUTROPHILS # BLD AUTO: 4.57 K/UL — SIGNIFICANT CHANGE UP (ref 1.8–7.4)
NEUTROPHILS # BLD AUTO: 4.74 K/UL — SIGNIFICANT CHANGE UP (ref 1.8–7.4)
NEUTROPHILS # CSF: SIGNIFICANT CHANGE UP % (ref 0–6)
NEUTROPHILS NFR BLD AUTO: 76.3 % — SIGNIFICANT CHANGE UP (ref 43–77)
NEUTROPHILS NFR BLD AUTO: 76.6 % — SIGNIFICANT CHANGE UP (ref 43–77)
NEUTROPHILS NFR BLD AUTO: 81.6 % — HIGH (ref 43–77)
NEUTROPHILS NFR BLD AUTO: 87.2 % — HIGH (ref 43–77)
NITRITE UR-MCNC: NEGATIVE — SIGNIFICANT CHANGE UP
NON-GYNECOLOGICAL CYTOLOGY STUDY: SIGNIFICANT CHANGE UP
NRBC NFR CSF: 0 /UL — SIGNIFICANT CHANGE UP (ref 0–5)
OVALOCYTES BLD QL SMEAR: SLIGHT — SIGNIFICANT CHANGE UP
PH UR: 6 — SIGNIFICANT CHANGE UP (ref 5–8)
PHOSPHATE SERPL-MCNC: 1.9 MG/DL — LOW (ref 2.4–4.7)
PLAT MORPH BLD: NORMAL — SIGNIFICANT CHANGE UP
PLATELET # BLD AUTO: 108 K/UL — LOW (ref 150–400)
PLATELET # BLD AUTO: 130 K/UL — LOW (ref 150–400)
PLATELET # BLD AUTO: 69 K/UL — LOW (ref 150–400)
PLATELET # BLD AUTO: 78 K/UL — LOW (ref 150–400)
PLATELET # BLD AUTO: 90 K/UL — LOW (ref 150–400)
PLATELET # BLD AUTO: 99 K/UL — LOW (ref 150–400)
POIKILOCYTOSIS BLD QL AUTO: SLIGHT — SIGNIFICANT CHANGE UP
POLYCHROMASIA BLD QL SMEAR: SLIGHT — SIGNIFICANT CHANGE UP
POTASSIUM SERPL-MCNC: 3.7 MMOL/L — SIGNIFICANT CHANGE UP (ref 3.5–5.3)
POTASSIUM SERPL-MCNC: 3.7 MMOL/L — SIGNIFICANT CHANGE UP (ref 3.5–5.3)
POTASSIUM SERPL-MCNC: 3.8 MMOL/L — SIGNIFICANT CHANGE UP (ref 3.5–5.3)
POTASSIUM SERPL-MCNC: 4 MMOL/L — SIGNIFICANT CHANGE UP (ref 3.5–5.3)
POTASSIUM SERPL-MCNC: 4 MMOL/L — SIGNIFICANT CHANGE UP (ref 3.5–5.3)
POTASSIUM SERPL-MCNC: 4.7 MMOL/L — SIGNIFICANT CHANGE UP (ref 3.5–5.3)
POTASSIUM SERPL-SCNC: 3.7 MMOL/L — SIGNIFICANT CHANGE UP (ref 3.5–5.3)
POTASSIUM SERPL-SCNC: 3.7 MMOL/L — SIGNIFICANT CHANGE UP (ref 3.5–5.3)
POTASSIUM SERPL-SCNC: 3.8 MMOL/L — SIGNIFICANT CHANGE UP (ref 3.5–5.3)
POTASSIUM SERPL-SCNC: 4 MMOL/L — SIGNIFICANT CHANGE UP (ref 3.5–5.3)
POTASSIUM SERPL-SCNC: 4 MMOL/L — SIGNIFICANT CHANGE UP (ref 3.5–5.3)
POTASSIUM SERPL-SCNC: 4.7 MMOL/L — SIGNIFICANT CHANGE UP (ref 3.5–5.3)
PREALB SERPL-MCNC: 5 MG/DL — LOW (ref 18–38)
PROT CSF-MCNC: 58 MG/DL — HIGH (ref 15–45)
PROT SERPL-MCNC: 6.3 G/DL — LOW (ref 6.6–8.7)
PROT SERPL-MCNC: 6.7 G/DL — SIGNIFICANT CHANGE UP (ref 6.6–8.7)
PROT UR-MCNC: 15 MG/DL
PROTHROM AB SERPL-ACNC: 13.6 SEC — SIGNIFICANT CHANGE UP (ref 10.6–13.6)
RBC # BLD: 2.58 M/UL — LOW (ref 3.8–5.2)
RBC # BLD: 2.64 M/UL — LOW (ref 3.8–5.2)
RBC # BLD: 2.66 M/UL — LOW (ref 3.8–5.2)
RBC # BLD: 2.7 M/UL — LOW (ref 3.8–5.2)
RBC # BLD: 2.74 M/UL — LOW (ref 3.8–5.2)
RBC # BLD: 2.81 M/UL — LOW (ref 3.8–5.2)
RBC # CSF: 0 /CMM — SIGNIFICANT CHANGE UP (ref 0–1)
RBC # FLD: 18 % — HIGH (ref 10.3–14.5)
RBC # FLD: 18.3 % — HIGH (ref 10.3–14.5)
RBC # FLD: 18.4 % — HIGH (ref 10.3–14.5)
RBC # FLD: 18.7 % — HIGH (ref 10.3–14.5)
RBC # FLD: 18.7 % — HIGH (ref 10.3–14.5)
RBC # FLD: 18.8 % — HIGH (ref 10.3–14.5)
RBC BLD AUTO: ABNORMAL
RBC CASTS # UR COMP ASSIST: SIGNIFICANT CHANGE UP /HPF (ref 0–4)
SARS-COV-2 IGG+IGM SERPL QL IA: >250 U/ML — HIGH
SARS-COV-2 IGG+IGM SERPL QL IA: POSITIVE
SARS-COV-2 RNA SPEC QL NAA+PROBE: SIGNIFICANT CHANGE UP
SODIUM SERPL-SCNC: 138 MMOL/L — SIGNIFICANT CHANGE UP (ref 135–145)
SODIUM SERPL-SCNC: 139 MMOL/L — SIGNIFICANT CHANGE UP (ref 135–145)
SODIUM SERPL-SCNC: 140 MMOL/L — SIGNIFICANT CHANGE UP (ref 135–145)
SODIUM SERPL-SCNC: 143 MMOL/L — SIGNIFICANT CHANGE UP (ref 135–145)
SP GR SPEC: 1.01 — SIGNIFICANT CHANGE UP (ref 1.01–1.02)
SPECIMEN SOURCE: SIGNIFICANT CHANGE UP
TIBC SERPL-MCNC: 259 UG/DL — SIGNIFICANT CHANGE UP (ref 220–430)
TRANSFERRIN SERPL-MCNC: 181 MG/DL — LOW (ref 192–382)
TUBE TYPE: SIGNIFICANT CHANGE UP
UROBILINOGEN FLD QL: 1 MG/DL
VARIANT LYMPHS # BLD: 0.9 % — SIGNIFICANT CHANGE UP (ref 0–6)
VIT B12 SERPL-MCNC: >2000 PG/ML — HIGH (ref 232–1245)
WBC # BLD: 11.02 K/UL — HIGH (ref 3.8–10.5)
WBC # BLD: 5.24 K/UL — SIGNIFICANT CHANGE UP (ref 3.8–10.5)
WBC # BLD: 5.38 K/UL — SIGNIFICANT CHANGE UP (ref 3.8–10.5)
WBC # BLD: 5.48 K/UL — SIGNIFICANT CHANGE UP (ref 3.8–10.5)
WBC # BLD: 5.71 K/UL — SIGNIFICANT CHANGE UP (ref 3.8–10.5)
WBC # BLD: 6.2 K/UL — SIGNIFICANT CHANGE UP (ref 3.8–10.5)
WBC # FLD AUTO: 11.02 K/UL — HIGH (ref 3.8–10.5)
WBC # FLD AUTO: 5.24 K/UL — SIGNIFICANT CHANGE UP (ref 3.8–10.5)
WBC # FLD AUTO: 5.38 K/UL — SIGNIFICANT CHANGE UP (ref 3.8–10.5)
WBC # FLD AUTO: 5.48 K/UL — SIGNIFICANT CHANGE UP (ref 3.8–10.5)
WBC # FLD AUTO: 5.71 K/UL — SIGNIFICANT CHANGE UP (ref 3.8–10.5)
WBC # FLD AUTO: 6.2 K/UL — SIGNIFICANT CHANGE UP (ref 3.8–10.5)
WBC UR QL: SIGNIFICANT CHANGE UP

## 2021-01-01 PROCEDURE — 70553 MRI BRAIN STEM W/O & W/DYE: CPT | Mod: 26

## 2021-01-01 PROCEDURE — 99215 OFFICE O/P EST HI 40 MIN: CPT

## 2021-01-01 PROCEDURE — 78815 PET IMAGE W/CT SKULL-THIGH: CPT | Mod: 26,PS

## 2021-01-01 PROCEDURE — U0003: CPT

## 2021-01-01 PROCEDURE — G1004: CPT

## 2021-01-01 PROCEDURE — 84157 ASSAY OF PROTEIN OTHER: CPT

## 2021-01-01 PROCEDURE — U0005: CPT

## 2021-01-01 PROCEDURE — 82728 ASSAY OF FERRITIN: CPT

## 2021-01-01 PROCEDURE — 77003 FLUOROGUIDE FOR SPINE INJECT: CPT

## 2021-01-01 PROCEDURE — 86769 SARS-COV-2 COVID-19 ANTIBODY: CPT

## 2021-01-01 PROCEDURE — 93000 ELECTROCARDIOGRAM COMPLETE: CPT

## 2021-01-01 PROCEDURE — 87483 CNS DNA AMP PROBE TYPE 12-25: CPT

## 2021-01-01 PROCEDURE — 85027 COMPLETE CBC AUTOMATED: CPT

## 2021-01-01 PROCEDURE — 88108 CYTOPATH CONCENTRATE TECH: CPT

## 2021-01-01 PROCEDURE — 70450 CT HEAD/BRAIN W/O DYE: CPT | Mod: MG

## 2021-01-01 PROCEDURE — 99233 SBSQ HOSP IP/OBS HIGH 50: CPT

## 2021-01-01 PROCEDURE — 83550 IRON BINDING TEST: CPT

## 2021-01-01 PROCEDURE — 99497 ADVNCD CARE PLAN 30 MIN: CPT

## 2021-01-01 PROCEDURE — A9585: CPT

## 2021-01-01 PROCEDURE — 87205 SMEAR GRAM STAIN: CPT

## 2021-01-01 PROCEDURE — 83735 ASSAY OF MAGNESIUM: CPT

## 2021-01-01 PROCEDURE — 99072 ADDL SUPL MATRL&STAF TM PHE: CPT

## 2021-01-01 PROCEDURE — 97116 GAIT TRAINING THERAPY: CPT

## 2021-01-01 PROCEDURE — 87040 BLOOD CULTURE FOR BACTERIA: CPT

## 2021-01-01 PROCEDURE — 74177 CT ABD & PELVIS W/CONTRAST: CPT | Mod: 26,MG

## 2021-01-01 PROCEDURE — 85610 PROTHROMBIN TIME: CPT

## 2021-01-01 PROCEDURE — 99254 IP/OBS CNSLTJ NEW/EST MOD 60: CPT

## 2021-01-01 PROCEDURE — 99232 SBSQ HOSP IP/OBS MODERATE 35: CPT

## 2021-01-01 PROCEDURE — 83540 ASSAY OF IRON: CPT

## 2021-01-01 PROCEDURE — 87070 CULTURE OTHR SPECIMN AEROBIC: CPT

## 2021-01-01 PROCEDURE — 84466 ASSAY OF TRANSFERRIN: CPT

## 2021-01-01 PROCEDURE — 97530 THERAPEUTIC ACTIVITIES: CPT

## 2021-01-01 PROCEDURE — 86901 BLOOD TYPING SEROLOGIC RH(D): CPT

## 2021-01-01 PROCEDURE — 86850 RBC ANTIBODY SCREEN: CPT

## 2021-01-01 PROCEDURE — 89051 BODY FLUID CELL COUNT: CPT

## 2021-01-01 PROCEDURE — 86900 BLOOD TYPING SEROLOGIC ABO: CPT

## 2021-01-01 PROCEDURE — 70553 MRI BRAIN STEM W/O & W/DYE: CPT

## 2021-01-01 PROCEDURE — 70450 CT HEAD/BRAIN W/O DYE: CPT | Mod: 26,MG

## 2021-01-01 PROCEDURE — 97163 PT EVAL HIGH COMPLEX 45 MIN: CPT

## 2021-01-01 PROCEDURE — 74177 CT ABD & PELVIS W/CONTRAST: CPT | Mod: MG

## 2021-01-01 PROCEDURE — 71045 X-RAY EXAM CHEST 1 VIEW: CPT

## 2021-01-01 PROCEDURE — 80048 BASIC METABOLIC PNL TOTAL CA: CPT

## 2021-01-01 PROCEDURE — 99285 EMERGENCY DEPT VISIT HI MDM: CPT

## 2021-01-01 PROCEDURE — 78816 PET IMAGE W/CT FULL BODY: CPT | Mod: 26,PS

## 2021-01-01 PROCEDURE — 81001 URINALYSIS AUTO W/SCOPE: CPT

## 2021-01-01 PROCEDURE — 93005 ELECTROCARDIOGRAM TRACING: CPT

## 2021-01-01 PROCEDURE — 36415 COLL VENOUS BLD VENIPUNCTURE: CPT

## 2021-01-01 PROCEDURE — 82746 ASSAY OF FOLIC ACID SERUM: CPT

## 2021-01-01 PROCEDURE — 82607 VITAMIN B-12: CPT

## 2021-01-01 PROCEDURE — 80053 COMPREHEN METABOLIC PANEL: CPT

## 2021-01-01 PROCEDURE — 62328 DX LMBR SPI PNXR W/FLUOR/CT: CPT

## 2021-01-01 PROCEDURE — 99285 EMERGENCY DEPT VISIT HI MDM: CPT | Mod: 25

## 2021-01-01 PROCEDURE — 36000 PLACE NEEDLE IN VEIN: CPT

## 2021-01-01 PROCEDURE — 82945 GLUCOSE OTHER FLUID: CPT

## 2021-01-01 PROCEDURE — 93010 ELECTROCARDIOGRAM REPORT: CPT

## 2021-01-01 PROCEDURE — 73502 X-RAY EXAM HIP UNI 2-3 VIEWS: CPT | Mod: LT

## 2021-01-01 PROCEDURE — 93306 TTE W/DOPPLER COMPLETE: CPT

## 2021-01-01 PROCEDURE — 85025 COMPLETE CBC W/AUTO DIFF WBC: CPT

## 2021-01-01 PROCEDURE — 71045 X-RAY EXAM CHEST 1 VIEW: CPT | Mod: 26

## 2021-01-01 PROCEDURE — 99284 EMERGENCY DEPT VISIT MOD MDM: CPT | Mod: 25

## 2021-01-01 PROCEDURE — 86803 HEPATITIS C AB TEST: CPT

## 2021-01-01 PROCEDURE — 84134 ASSAY OF PREALBUMIN: CPT

## 2021-01-01 PROCEDURE — 99213 OFFICE O/P EST LOW 20 MIN: CPT

## 2021-01-01 PROCEDURE — 83615 LACTATE (LD) (LDH) ENZYME: CPT

## 2021-01-01 PROCEDURE — 96372 THER/PROPH/DIAG INJ SC/IM: CPT

## 2021-01-01 PROCEDURE — 99239 HOSP IP/OBS DSCHRG MGMT >30: CPT

## 2021-01-01 PROCEDURE — 85730 THROMBOPLASTIN TIME PARTIAL: CPT

## 2021-01-01 PROCEDURE — 99223 1ST HOSP IP/OBS HIGH 75: CPT

## 2021-01-01 PROCEDURE — 84100 ASSAY OF PHOSPHORUS: CPT

## 2021-01-01 PROCEDURE — 88108 CYTOPATH CONCENTRATE TECH: CPT | Mod: 26

## 2021-01-01 RX ORDER — POTASSIUM CHLORIDE 20 MEQ
0 PACKET (EA) ORAL
Qty: 0 | Refills: 0 | DISCHARGE

## 2021-01-01 RX ORDER — SODIUM CHLORIDE 9 MG/ML
500 INJECTION INTRAMUSCULAR; INTRAVENOUS; SUBCUTANEOUS
Refills: 0 | Status: COMPLETED | OUTPATIENT
Start: 2021-01-01 | End: 2021-01-01

## 2021-01-01 RX ORDER — LETROZOLE TABLETS 2.5 MG/1
2.5 TABLET, FILM COATED ORAL DAILY
Refills: 0 | Status: DISCONTINUED | COMMUNITY
End: 2021-01-01

## 2021-01-01 RX ORDER — PREGABALIN 225 MG/1
1000 CAPSULE ORAL DAILY
Refills: 0 | Status: DISCONTINUED | OUTPATIENT
Start: 2021-01-01 | End: 2021-01-01

## 2021-01-01 RX ORDER — CAPECITABINE 500 MG/1
650 TABLET ORAL
Refills: 0 | Status: DISCONTINUED | OUTPATIENT
Start: 2021-01-01 | End: 2021-01-01

## 2021-01-01 RX ORDER — PIPERACILLIN AND TAZOBACTAM 4; .5 G/20ML; G/20ML
3.38 INJECTION, POWDER, LYOPHILIZED, FOR SOLUTION INTRAVENOUS ONCE
Refills: 0 | Status: COMPLETED | OUTPATIENT
Start: 2021-01-01 | End: 2021-01-01

## 2021-01-01 RX ORDER — CAPECITABINE 500 MG/1
0 TABLET ORAL
Qty: 0 | Refills: 0 | DISCHARGE

## 2021-01-01 RX ORDER — MECLIZINE HCL 12.5 MG
25 TABLET ORAL ONCE
Refills: 0 | Status: COMPLETED | OUTPATIENT
Start: 2021-01-01 | End: 2021-01-01

## 2021-01-01 RX ORDER — DEXAMETHASONE 0.5 MG/5ML
4 ELIXIR ORAL EVERY 6 HOURS
Refills: 0 | Status: DISCONTINUED | OUTPATIENT
Start: 2021-01-01 | End: 2021-01-01

## 2021-01-01 RX ORDER — HEPARIN SODIUM 5000 [USP'U]/ML
5000 INJECTION INTRAVENOUS; SUBCUTANEOUS EVERY 12 HOURS
Refills: 0 | Status: DISCONTINUED | OUTPATIENT
Start: 2021-01-01 | End: 2021-01-01

## 2021-01-01 RX ORDER — LEVETIRACETAM 250 MG/1
1 TABLET, FILM COATED ORAL
Qty: 60 | Refills: 0
Start: 2021-01-01 | End: 2021-01-01

## 2021-01-01 RX ORDER — ENOXAPARIN SODIUM 100 MG/ML
40 INJECTION SUBCUTANEOUS DAILY
Refills: 0 | Status: DISCONTINUED | OUTPATIENT
Start: 2021-01-01 | End: 2021-01-01

## 2021-01-01 RX ORDER — DEXAMETHASONE 0.5 MG/5ML
1 ELIXIR ORAL
Qty: 40 | Refills: 0
Start: 2021-01-01 | End: 2021-01-01

## 2021-01-01 RX ORDER — PYRIDOXINE HCL (VITAMIN B6) 100 MG
100 TABLET ORAL DAILY
Refills: 0 | Status: DISCONTINUED | OUTPATIENT
Start: 2021-01-01 | End: 2021-01-01

## 2021-01-01 RX ORDER — SODIUM CHLORIDE 9 MG/ML
1000 INJECTION INTRAMUSCULAR; INTRAVENOUS; SUBCUTANEOUS ONCE
Refills: 0 | Status: COMPLETED | OUTPATIENT
Start: 2021-01-01 | End: 2021-01-01

## 2021-01-01 RX ORDER — ACETAMINOPHEN 500 MG
2 TABLET ORAL
Qty: 0 | Refills: 0 | DISCHARGE
Start: 2021-01-01

## 2021-01-01 RX ORDER — PIPERACILLIN AND TAZOBACTAM 4; .5 G/20ML; G/20ML
3.38 INJECTION, POWDER, LYOPHILIZED, FOR SOLUTION INTRAVENOUS EVERY 8 HOURS
Refills: 0 | Status: DISCONTINUED | OUTPATIENT
Start: 2021-01-01 | End: 2021-01-01

## 2021-01-01 RX ORDER — TRASTUZUMAB 150 MG/7.4ML
150 INJECTION, POWDER, LYOPHILIZED, FOR SOLUTION INTRAVENOUS
Refills: 0 | Status: DISCONTINUED | COMMUNITY
End: 2021-01-01

## 2021-01-01 RX ORDER — CARVEDILOL 3.12 MG/1
3.12 TABLET, FILM COATED ORAL
Qty: 180 | Refills: 3 | Status: ACTIVE | COMMUNITY
Start: 2020-01-01 | End: 1900-01-01

## 2021-01-01 RX ORDER — LACTOBACILLUS ACIDOPHILUS 100MM CELL
1 CAPSULE ORAL
Refills: 0 | Status: DISCONTINUED | OUTPATIENT
Start: 2021-01-01 | End: 2021-01-01

## 2021-01-01 RX ORDER — LEVETIRACETAM 250 MG/1
500 TABLET, FILM COATED ORAL
Refills: 0 | Status: DISCONTINUED | OUTPATIENT
Start: 2021-01-01 | End: 2021-01-01

## 2021-01-01 RX ORDER — MULTIVIT WITH MIN/MFOLATE/K2 340-15/3 G
400 POWDER (GRAM) ORAL
Qty: 0 | Refills: 0 | DISCHARGE

## 2021-01-01 RX ORDER — CHOLECALCIFEROL (VITAMIN D3) 125 MCG
2000 CAPSULE ORAL DAILY
Refills: 0 | Status: DISCONTINUED | OUTPATIENT
Start: 2021-01-01 | End: 2021-01-01

## 2021-01-01 RX ORDER — ONDANSETRON 8 MG/1
4 TABLET, FILM COATED ORAL EVERY 8 HOURS
Refills: 0 | Status: DISCONTINUED | OUTPATIENT
Start: 2021-01-01 | End: 2021-01-01

## 2021-01-01 RX ORDER — ACETAMINOPHEN 500 MG
650 TABLET ORAL EVERY 8 HOURS
Refills: 0 | Status: DISCONTINUED | OUTPATIENT
Start: 2021-01-01 | End: 2021-01-01

## 2021-01-01 RX ORDER — ACETAMINOPHEN 500 MG
650 TABLET ORAL ONCE
Refills: 0 | Status: COMPLETED | OUTPATIENT
Start: 2021-01-01 | End: 2021-01-01

## 2021-01-01 RX ADMIN — Medication 650 MILLIGRAM(S): at 07:36

## 2021-01-01 RX ADMIN — Medication 650 MILLIGRAM(S): at 11:31

## 2021-01-01 RX ADMIN — Medication 1 TABLET(S): at 17:04

## 2021-01-01 RX ADMIN — PIPERACILLIN AND TAZOBACTAM 25 GRAM(S): 4; .5 INJECTION, POWDER, LYOPHILIZED, FOR SOLUTION INTRAVENOUS at 05:11

## 2021-01-01 RX ADMIN — PIPERACILLIN AND TAZOBACTAM 25 GRAM(S): 4; .5 INJECTION, POWDER, LYOPHILIZED, FOR SOLUTION INTRAVENOUS at 17:31

## 2021-01-01 RX ADMIN — Medication 1 TABLET(S): at 12:58

## 2021-01-01 RX ADMIN — PIPERACILLIN AND TAZOBACTAM 25 GRAM(S): 4; .5 INJECTION, POWDER, LYOPHILIZED, FOR SOLUTION INTRAVENOUS at 05:34

## 2021-01-01 RX ADMIN — PREGABALIN 1000 MICROGRAM(S): 225 CAPSULE ORAL at 12:03

## 2021-01-01 RX ADMIN — LEVETIRACETAM 500 MILLIGRAM(S): 250 TABLET, FILM COATED ORAL at 05:39

## 2021-01-01 RX ADMIN — Medication 100 MILLIGRAM(S): at 11:32

## 2021-01-01 RX ADMIN — Medication 4 MILLIGRAM(S): at 17:56

## 2021-01-01 RX ADMIN — Medication 4 MILLIGRAM(S): at 14:41

## 2021-01-01 RX ADMIN — Medication 4 MILLIGRAM(S): at 17:05

## 2021-01-01 RX ADMIN — Medication 2000 UNIT(S): at 11:49

## 2021-01-01 RX ADMIN — PIPERACILLIN AND TAZOBACTAM 25 GRAM(S): 4; .5 INJECTION, POWDER, LYOPHILIZED, FOR SOLUTION INTRAVENOUS at 21:25

## 2021-01-01 RX ADMIN — Medication 1 TABLET(S): at 11:32

## 2021-01-01 RX ADMIN — Medication 4 MILLIGRAM(S): at 06:57

## 2021-01-01 RX ADMIN — Medication 25 MILLIGRAM(S): at 20:35

## 2021-01-01 RX ADMIN — Medication 1 TABLET(S): at 18:11

## 2021-01-01 RX ADMIN — HEPARIN SODIUM 5000 UNIT(S): 5000 INJECTION INTRAVENOUS; SUBCUTANEOUS at 05:41

## 2021-01-01 RX ADMIN — Medication 4 MILLIGRAM(S): at 15:23

## 2021-01-01 RX ADMIN — Medication 1 TABLET(S): at 17:56

## 2021-01-01 RX ADMIN — Medication 1 TABLET(S): at 08:00

## 2021-01-01 RX ADMIN — Medication 100 MILLIGRAM(S): at 12:59

## 2021-01-01 RX ADMIN — LEVETIRACETAM 500 MILLIGRAM(S): 250 TABLET, FILM COATED ORAL at 07:04

## 2021-01-01 RX ADMIN — Medication 2000 UNIT(S): at 08:32

## 2021-01-01 RX ADMIN — Medication 1 TABLET(S): at 09:43

## 2021-01-01 RX ADMIN — Medication 4 MILLIGRAM(S): at 05:10

## 2021-01-01 RX ADMIN — LEVETIRACETAM 500 MILLIGRAM(S): 250 TABLET, FILM COATED ORAL at 17:05

## 2021-01-01 RX ADMIN — SODIUM CHLORIDE 1000 MILLILITER(S): 9 INJECTION INTRAMUSCULAR; INTRAVENOUS; SUBCUTANEOUS at 20:35

## 2021-01-01 RX ADMIN — Medication 4 MILLIGRAM(S): at 00:10

## 2021-01-01 RX ADMIN — Medication 2000 UNIT(S): at 11:32

## 2021-01-01 RX ADMIN — Medication 1 TABLET(S): at 11:20

## 2021-01-01 RX ADMIN — LEVETIRACETAM 500 MILLIGRAM(S): 250 TABLET, FILM COATED ORAL at 06:25

## 2021-01-01 RX ADMIN — PREGABALIN 1000 MICROGRAM(S): 225 CAPSULE ORAL at 11:49

## 2021-01-01 RX ADMIN — LEVETIRACETAM 500 MILLIGRAM(S): 250 TABLET, FILM COATED ORAL at 05:34

## 2021-01-01 RX ADMIN — Medication 4 MILLIGRAM(S): at 01:00

## 2021-01-01 RX ADMIN — Medication 2000 UNIT(S): at 12:03

## 2021-01-01 RX ADMIN — ENOXAPARIN SODIUM 40 MILLIGRAM(S): 100 INJECTION SUBCUTANEOUS at 08:32

## 2021-01-01 RX ADMIN — Medication 1 TABLET(S): at 12:03

## 2021-01-01 RX ADMIN — LEVETIRACETAM 500 MILLIGRAM(S): 250 TABLET, FILM COATED ORAL at 06:57

## 2021-01-01 RX ADMIN — Medication 1 TABLET(S): at 08:31

## 2021-01-01 RX ADMIN — PREGABALIN 1000 MICROGRAM(S): 225 CAPSULE ORAL at 12:59

## 2021-01-01 RX ADMIN — PREGABALIN 1000 MICROGRAM(S): 225 CAPSULE ORAL at 11:32

## 2021-01-01 RX ADMIN — Medication 2000 UNIT(S): at 12:58

## 2021-01-01 RX ADMIN — Medication 650 MILLIGRAM(S): at 21:46

## 2021-01-01 RX ADMIN — LEVETIRACETAM 500 MILLIGRAM(S): 250 TABLET, FILM COATED ORAL at 17:56

## 2021-01-01 RX ADMIN — LEVETIRACETAM 500 MILLIGRAM(S): 250 TABLET, FILM COATED ORAL at 16:58

## 2021-01-01 RX ADMIN — Medication 1 TABLET(S): at 16:58

## 2021-01-01 RX ADMIN — PIPERACILLIN AND TAZOBACTAM 200 GRAM(S): 4; .5 INJECTION, POWDER, LYOPHILIZED, FOR SOLUTION INTRAVENOUS at 21:44

## 2021-01-01 RX ADMIN — SODIUM CHLORIDE 100 MILLILITER(S): 9 INJECTION INTRAMUSCULAR; INTRAVENOUS; SUBCUTANEOUS at 03:20

## 2021-01-01 RX ADMIN — Medication 1 TABLET(S): at 15:23

## 2021-01-01 RX ADMIN — Medication 1 TABLET(S): at 11:49

## 2021-01-01 RX ADMIN — PREGABALIN 1000 MICROGRAM(S): 225 CAPSULE ORAL at 15:23

## 2021-01-01 RX ADMIN — Medication 4 MILLIGRAM(S): at 05:34

## 2021-01-01 RX ADMIN — LEVETIRACETAM 500 MILLIGRAM(S): 250 TABLET, FILM COATED ORAL at 17:38

## 2021-01-01 RX ADMIN — PIPERACILLIN AND TAZOBACTAM 25 GRAM(S): 4; .5 INJECTION, POWDER, LYOPHILIZED, FOR SOLUTION INTRAVENOUS at 14:54

## 2021-01-01 RX ADMIN — PIPERACILLIN AND TAZOBACTAM 25 GRAM(S): 4; .5 INJECTION, POWDER, LYOPHILIZED, FOR SOLUTION INTRAVENOUS at 05:41

## 2021-01-01 RX ADMIN — Medication 100 MILLIGRAM(S): at 15:23

## 2021-01-01 RX ADMIN — Medication 2000 UNIT(S): at 15:23

## 2021-01-01 RX ADMIN — LEVETIRACETAM 500 MILLIGRAM(S): 250 TABLET, FILM COATED ORAL at 18:11

## 2021-01-01 RX ADMIN — Medication 4 MILLIGRAM(S): at 06:25

## 2021-01-01 RX ADMIN — Medication 100 MILLIGRAM(S): at 11:49

## 2021-01-01 RX ADMIN — Medication 1 TABLET(S): at 17:33

## 2021-01-01 RX ADMIN — Medication 650 MILLIGRAM(S): at 05:39

## 2021-01-01 RX ADMIN — Medication 100 MILLIGRAM(S): at 12:03

## 2021-01-01 RX ADMIN — Medication 4 MILLIGRAM(S): at 11:20

## 2021-01-01 RX ADMIN — ENOXAPARIN SODIUM 40 MILLIGRAM(S): 100 INJECTION SUBCUTANEOUS at 21:29

## 2021-01-01 RX ADMIN — PIPERACILLIN AND TAZOBACTAM 25 GRAM(S): 4; .5 INJECTION, POWDER, LYOPHILIZED, FOR SOLUTION INTRAVENOUS at 23:30

## 2021-01-01 RX ADMIN — PREGABALIN 1000 MICROGRAM(S): 225 CAPSULE ORAL at 08:32

## 2021-01-01 RX ADMIN — Medication 100 MILLIGRAM(S): at 08:31

## 2021-01-01 RX ADMIN — Medication 4 MILLIGRAM(S): at 01:15

## 2021-01-01 RX ADMIN — Medication 4 MILLIGRAM(S): at 18:11

## 2021-01-07 PROBLEM — Z96.642 HISTORY OF LEFT HIP REPLACEMENT: Status: ACTIVE | Noted: 2020-01-16

## 2021-01-07 NOTE — DISCUSSION/SUMMARY
[de-identified] : The patient is a 75 year old female 2 years s/p posterior approach Left THR, Prostalac antibiotic cement spacer. She will continue home strengthening exercises. Overall, she is very happy with the results of the surgery.  Her left hip continues to be pain-free.  We are going to leave the antibiotic cement spacer and indefinitely.  She is continue to undergo treatment for metastatic breast cancer.  Dental prophylaxis was advised. Follow up in 2 years for radiographic surveillance.

## 2021-01-07 NOTE — HISTORY OF PRESENT ILLNESS
[de-identified] : The patient is a 75 year old female 2 years s/p posterior approach Left THR, Prostalac antibiotic cement spacer. She is ambulating and transferring well without assistive devices. She continues daily activities of life without significant pain or discomfort. Overall, she is very happy with the results of the surgery.

## 2021-01-07 NOTE — ADDENDUM
[FreeTextEntry1] : This note was authored by Floyd Stringer working as a medical scribe for Dr. Mahendra Mora. The note was reviewed, edited, and revised by Dr. Mahendra Mora whom is in agreement with the exam findings, imaging findings, and treatment plan. 01/07/2021.

## 2021-01-07 NOTE — PHYSICAL EXAM
[de-identified] : The patient appears well nourished  and in no apparent distress.  The patient is alert and oriented to person, place, and time.   Affect and mood appear normal. The head is normocephalic and atraumatic.  The eyes reveal normal sclera and extra ocular muscles are intact. The tongue is midline with no apparent lesions.  Skin shows normal turgor with no evidence of eczema or psoriasis.  No respiratory distress noted.  Sensation grossly intact.\par   [de-identified] : Exam of the left hip shows a well healed incision. Good strength with SLR hip external rotation of 45 degrees, internal rotation of 25 degrees, hip flexion of 90 degrees. 5/5 motor strength bilaterally distally. Sensation intact distally. LFCN intact.  [de-identified] : Xray- AP pelvis and 2 views of the left hip shows a left Prostalac antibiotic cement spacer without sign of fracture or dislocation. No loosening.

## 2021-01-22 PROBLEM — R55 SYNCOPE: Status: ACTIVE | Noted: 2020-01-01

## 2021-01-22 NOTE — CARDIOLOGY SUMMARY
[No Symptoms] : no Symptoms [___] : [unfilled] [LVEF ___%] : LVEF [unfilled]% [Normal] : normal LA size [None] : no mitral regurgitation [de-identified] : 1/8/2019 [FreeTextEntry2] : jan 2019 LVEF 70%. GLS = -20%. no significant valvular abnormality  [de-identified] : MUGA scan: 3/14/2019:LVEf 63%\par carotid US: sept 2020: midl -plaque.

## 2021-01-22 NOTE — DISCUSSION/SUMMARY
[Patient] : the patient [Risks] : risks [Benefits] : benefits [Alternatives] : alternatives [___ Month(s)] : [unfilled] month(s) [With Me] : with me [FreeTextEntry1] : This is a 73 year old woman with history of breast ca, stage metastatic to LNs, history of unprovoked DVT, here for Pre-operative cardiovascular risk evaluation and management for left hip surgery\par 1) encounter for cardiotoxic therapeutic agents :  coreg  3.125  \par 2) hypertension :  coreg. 3.125 mg Q12. prophylactically prevnt drug induced cardiotoxicity. \par 3) deep vein thrombosis upper arm. : unprovoked DVT;  resolved  . off anticoagulation as per hematologist. \par \par \par

## 2021-01-22 NOTE — HISTORY OF PRESENT ILLNESS
[FreeTextEntry1] : encounter for monitoring of cardiotoxic chemotherapy,\par \par HPI for today: : oct 28 she passe out after she started new chemotherapy.  she had sycnope.  and she went to [facility].  her BP was 120/70;  she got MRI done that showeed mets in brain.  she is on radiation to brain and to breast  . she finised breast radiation last thursday\par \par the tupmours were tiny , and unlikely to be the reason for syncope.\par they lowered the coreg. split it in half. she is feeling better now.  \par \par old note:   patient on tukysa (tucatinib) her2 antagonist. Started recently. there is interaction with xrarelto and coreg. Increases levels for those meds\par Paitent stopped xarelto has it was due to right upper arm swelling.\par no dizziness.\par no headacehs. \par \par  old note: july she had shuingles and her nerve pain is better. her hiop feels better. she had hip repalcemenrt in jan 2020,. \par no chest pain. no dyspnea,. dehdyraion better. occasional dirrhea.\par no dizziness,. no syncope. + lighthedadenss last week.\par gained weight. \par \par \par old note: patient was sick and dehydration and had diarrhea and was dehydration.  she got a lot of fluids. \par Iv fluids. \par \par old note: patient. tolerated hip replacement well.No cardia complications.\par no chest pain. no dyspnea. no Le edema. \par she got physical therapy. no palptiaitons. no dizziness. no syncope,.

## 2021-07-28 PROBLEM — Z51.81 ENCOUNTER FOR MONITORING CARDIOTOXIC DRUG THERAPY: Status: ACTIVE | Noted: 2019-01-08

## 2021-07-28 PROBLEM — E86.0 DEHYDRATION: Status: ACTIVE | Noted: 2019-10-01

## 2021-07-28 PROBLEM — I10 BENIGN ESSENTIAL HYPERTENSION: Status: ACTIVE | Noted: 2020-01-14

## 2021-07-28 PROBLEM — R06.00 DYSPNEA ON EXERTION: Status: ACTIVE | Noted: 2019-01-08

## 2021-07-29 NOTE — DISCUSSION/SUMMARY
[Patient] : the patient [Risks] : risks [Benefits] : benefits [Alternatives] : alternatives [With Me] : with me [___ Month(s)] : in [unfilled] month(s) [FreeTextEntry1] : This is a 73 year old woman with history of breast ca, stage metastatic to LNs, history of unprovoked DVT, here for Pre-operative cardiovascular risk evaluation and management for left hip surgery\par \par 1) encounter for cardiotoxic therapeutic agents :  coreg  3.125   hold meds for 3-4 days and restart it. \par 2) hypertension :  coreg. 3.125 mg Q12.  will hold for 3-4 days until she gets her hydration back. \par 3) deep vein thrombosis upper arm. : unprovoked DVT;  resolved  . off anticoagulation as per hematologist. \par repeat echo few weeks bfore next appt. \par az\par \par \par

## 2021-07-29 NOTE — PHYSICAL EXAM
[General Appearance - Well Developed] : well developed [Normal Appearance] : normal appearance [Well Groomed] : well groomed [General Appearance - Well Nourished] : well nourished [No Deformities] : no deformities [General Appearance - In No Acute Distress] : no acute distress [Normal Conjunctiva] : the conjunctiva exhibited no abnormalities [Eyelids - No Xanthelasma] : the eyelids demonstrated no xanthelasmas [Normal Oral Mucosa] : normal oral mucosa [No Oral Pallor] : no oral pallor [No Oral Cyanosis] : no oral cyanosis [Normal Jugular Venous A Waves Present] : normal jugular venous A waves present [Normal Jugular Venous V Waves Present] : normal jugular venous V waves present [No Jugular Venous Bridges A Waves] : no jugular venous bridges A waves [Respiration, Rhythm And Depth] : normal respiratory rhythm and effort [Exaggerated Use Of Accessory Muscles For Inspiration] : no accessory muscle use [Auscultation Breath Sounds / Voice Sounds] : lungs were clear to auscultation bilaterally [Heart Rate And Rhythm] : heart rate and rhythm were normal [Heart Sounds] : normal S1 and S2 [Abdomen Soft] : soft [Abdomen Tenderness] : non-tender [Abdomen Mass (___ Cm)] : no abdominal mass palpated [Nail Clubbing] : no clubbing of the fingernails [Cyanosis, Localized] : no localized cyanosis [Petechial Hemorrhages (___cm)] : no petechial hemorrhages [Skin Color & Pigmentation] : normal skin color and pigmentation [] : no rash [No Venous Stasis] : no venous stasis [Skin Lesions] : no skin lesions [No Skin Ulcers] : no skin ulcer [No Xanthoma] : no  xanthoma was observed [Oriented To Time, Place, And Person] : oriented to person, place, and time [Affect] : the affect was normal [Mood] : the mood was normal [No Anxiety] : not feeling anxious [FreeTextEntry1] : poor skin tugor. dehydrated

## 2021-07-29 NOTE — CARDIOLOGY SUMMARY
[No Symptoms] : no Symptoms [___] : [unfilled] [LVEF ___%] : LVEF [unfilled]% [Normal] : normal LA size [None] : no mitral regurgitation [de-identified] : 7/28/2021   Sinus  Rhythm \par non specific ST T changes \par \par ABNORMAL \par  [de-identified] : 1/8/2019 [FreeTextEntry2] : jan 2019 LVEF 70%. GLS = -20%. no significant valvular abnormality  [de-identified] : MUGA scan: 3/14/2019:LVEf 63%\par carotid US: sept 2020: midl -plaque.

## 2021-07-29 NOTE — HISTORY OF PRESENT ILLNESS
[FreeTextEntry1] : encounter for monitoring of cardiotoxic chemotherapy,\par \par HPI for today: : she was started on nerylynx, and xeloda. and she stated having a nausea nad vomtiign and severe diagrrhea. she stopped  the meds.  she lost a a lot of weight she could nto eat and drink\par she got cataract surgery on the left eye. she is doing good. \par \par old note:  : oct 28 she passe out after she started new chemotherapy.  she had sycnope.  and she went to [facility].  her BP was 120/70;  she got MRI done that showeed mets in brain.  she is on radiation to brain and to breast  . she finised breast radiation last thursday\par \par the tupmours were tiny , and unlikely to be the reason for syncope.\par they lowered the coreg. split it in half. she is feeling better now.  \par \par old note:   patient on tukysa (tucatinib) her2 antagonist. Started recently. there is interaction with xrarelto and coreg. Increases levels for those meds\par Paitent stopped xarelto has it was due to right upper arm swelling.\par no dizziness.\par no headacehs. \par \par  old note: july she had shuingles and her nerve pain is better. her hiop feels better. she had hip repalcemenrt in jan 2020,. \par no chest pain. no dyspnea,. dehdyraion better. occasional dirrhea.\par no dizziness,. no syncope. + lighthedadenss last week.\par gained weight. \par \par \par old note: patient was sick and dehydration and had diarrhea and was dehydration.  she got a lot of fluids. \par Iv fluids. \par \par old note: patient. tolerated hip replacement well.No cardia complications.\par no chest pain. no dyspnea. no Le edema. \par she got physical therapy. no palptiaitons. no dizziness. no syncope,.

## 2021-09-22 NOTE — ED ADULT NURSE NOTE - NSIMPLEMENTINTERV_GEN_ALL_ED
Implemented All Universal Safety Interventions:  [place] to call system. Call bell, personal items and telephone within reach. Instruct patient to call for assistance. Room bathroom lighting operational. Non-slip footwear when patient is off stretcher. Physically safe environment: no spills, clutter or unnecessary equipment. Stretcher in lowest position, wheels locked, appropriate side rails in place. Implemented All Fall Risk Interventions:  [place] to call system. Call bell, personal items and telephone within reach. Instruct patient to call for assistance. Room bathroom lighting operational. Non-slip footwear when patient is off stretcher. Physically safe environment: no spills, clutter or unnecessary equipment. Stretcher in lowest position, wheels locked, appropriate side rails in place. Provide visual cue, wrist band, yellow gown, etc. Monitor gait and stability. Monitor for mental status changes and reorient to person, place, and time. Review medications for side effects contributing to fall risk. Reinforce activity limits and safety measures with patient and family.

## 2021-09-22 NOTE — ED PROVIDER NOTE - NSICDXPASTMEDICALHX_GEN_ALL_CORE_FT
PAST MEDICAL HISTORY:  Bacteremia     Breast cancer right breast    DVT of upper extremity (deep vein thrombosis) RIGHT    Essential hypertension     Heart murmur     Lymphedema     Risk factors for obstructive sleep apnea     
endoscopy

## 2021-09-22 NOTE — ED PROVIDER NOTE - PROGRESS NOTE DETAILS
spoke with pt at length known brain mets prior to arrival she is neuro intact ambulating without distress she can f/u dr peñaloza [place] and blood she agrees to this plan of care she was instructed to show ctr results to this oncology team tomm without fail for their further outpt management

## 2021-09-22 NOTE — ED PROVIDER NOTE - NSICDXPASTSURGICALHX_GEN_ALL_CORE_FT
PAST SURGICAL HISTORY:  PICC (peripherally inserted central catheter) in place Left chest wall catheter Inserted October, 2018    S/P biopsy R breast    S/P thoracentesis 3/2016

## 2021-09-22 NOTE — ED PROVIDER NOTE - OBJECTIVE STATEMENT
74yo F with stage IV breast CA had brain mets in january 2020 s/p sx a GSH, currently on new oral regimen that causes dizziness, intermittent periods of off balance/double vision. did have cataract surgery 2 weeks ago and feels started after that. no cp/sob. has intermittent vomiting from chemo. no abd pain. admits PO intake may be less than normal. also with intermittent frontal HA. throbbin in nature. no trauma

## 2021-09-22 NOTE — ED ADULT TRIAGE NOTE - CHIEF COMPLAINT QUOTE
pt BIBA from home a&ox3, no acute distress, breaths even and unlabored c/o dizziness and generalized weakness x 1 week with mild frontal headache. reports dizziness gets worse with changing positions. presents with L anterior chest wall PICC line present for chemo for stage 4 breast CA, last dose 2 weeks ago.

## 2021-09-22 NOTE — ED PROVIDER NOTE - NSICDXFAMILYHX_GEN_ALL_CORE_FT
FAMILY HISTORY:  Father  Still living? No  Family history of cancer of frontal sinus, Age at diagnosis: Age Unknown  Family history of lung cancer, Age at diagnosis: Age Unknown    Sibling  Still living? Unknown  Family history of breast cancer in first degree relative, Age at diagnosis: Age Unknown

## 2021-09-22 NOTE — ED ADULT NURSE REASSESSMENT NOTE - NS ED NURSE REASSESS COMMENT FT1
assumed care of pt at 23:15. Report received from day shift rn. charting as noted. rr even and unlabored. able to move all extremities well. anox3. no apparent distress noted. iv intact. vss. call bell within reach to maintain safety. denies chest pain or sob. denies pain at this time. pt educated on plan of care, pt able to successfully teach back plan of care to RN, RN will continue to reeducate pt during hospital stay.

## 2021-09-22 NOTE — ED PROVIDER NOTE - NS ED ROS FT
ROS: no CP/SOB. no cough. no fever. +n/v no d/c. no abd pain. no rash. no bleeding. no urinary complaints. no weakness. no vision changes. +HA. no neck/back pain. no extremity swelling/deformity. No change in mental status.

## 2021-09-22 NOTE — ED PROVIDER NOTE - CLINICAL SUMMARY MEDICAL DECISION MAKING FREE TEXT BOX
patient with HA, intermitent dizziness unclear if vertigo/or orthostatic because is positional. neuro intact concerned for mets. ct meclizine reasses

## 2021-09-22 NOTE — ED PROVIDER NOTE - PHYSICAL EXAMINATION
Gen: NAD, AOx3  Head: NCAT  HEENT: EOMI, oral mucosa moist, normal conjunctiva, neck supple, +nystagmus  Lung: CTAB, no respiratory distress  CV: rrr, no murmur, Normal perfusion  Abd: soft, NTND  MSK: No edema, no visible deformities  Neuro: No focal neurologic deficits, CN II-XII intact, 5/5 global strength, sensation intact, no dysmetria/ataxia  Skin: No rash   Psych: normal affect

## 2021-09-22 NOTE — ED PROVIDER NOTE - PATIENT PORTAL LINK FT
You can access the FollowMyHealth Patient Portal offered by [facility] by registering at the following website: [email or web address]. By joining [employer]’s FollowMyHealth portal, you will also be able to view your health information using other applications (apps) compatible with our system.

## 2021-09-23 NOTE — ED ADULT NURSE NOTE - NSICDXPASTMEDICALHX_GEN_ALL_CORE_FT
PAST MEDICAL HISTORY:  Bacteremia     Breast cancer right breast    DVT of upper extremity (deep vein thrombosis) RIGHT    Essential hypertension     Heart murmur     Lymphedema     Risk factors for obstructive sleep apnea

## 2021-09-23 NOTE — H&P ADULT - NSICDXFAMILYHX_GEN_ALL_CORE_FT
FAMILY HISTORY:  Father  Still living? No  Family history of cancer of frontal sinus, Age at diagnosis: Age Unknown  Family history of lung cancer, Age at diagnosis: Age Unknown    Sibling  Still living? Unknown  Family history of breast cancer in first degree relative, Age at diagnosis: Age Unknown  FH: breast cancer, Age at diagnosis: Age Unknown

## 2021-09-23 NOTE — ED PROVIDER NOTE - CLINICAL SUMMARY MEDICAL DECISION MAKING FREE TEXT BOX
74 yo female with known cancer on chemo presenting for generalized weakness. Patient seen yesterday without new findings. Patient has persisting sxs. Vitally stable. Call placed to Dr. Bueno.

## 2021-09-23 NOTE — H&P ADULT - HISTORY OF PRESENT ILLNESS
76 y/o female with h/o metastatic breast cancer was seen at [facility] ER yesterday as she was felt dizzy and was discharged from the ER after her blood work and ct head. Today pt. was brought back as she slid from her bed to the floor while getting oob. pt. did not hit hear head and no LOC. pt. could not get up and her  also could not pick her up, ambulance was called and pt. brought to the ER. pt. walks with cane. pt. is alert, awake , oriented x 3 in the ER and has no focal weakness, no cp, no sob. no abdominal pain, no n/v/d. denies any urinary symptoms. pt's tmax 100.9 in the ER, no tachycardia, bp stable.   pt. stated that she was diagnosed with breast cancer in 2015, had radiation treatment and is on po xeloda now, did not have any breast surgery, her radiation oncologist is dr. Maravilla at [facility], oncologist id dr. Galicia.    pt's ct head from yesterday : There is no evidence of an acute hemorrhage or mass-effect in the posterior fossa or in the supratentorial region. Interval development of scattered areas of vasogenic edema throughout the brain suspicious for underlying metastatic disease. Consider a nonemergent follow-up contrast-enhanced MRI of the brain for further evaluation.  Findings of ct scan discussed with pt. who does not want any further testing like MRI brain while here and plans to do that at her radiation oncologist Dr. Maravilla office upon discharge.   74 y/o female with h/o metastatic breast cancer was seen at [facility] ER yesterday as she was felt dizzy and was discharged from the ER after her blood work and ct head. Today pt. was brought back as she slid from her bed to the floor while getting oob. pt. did not hit hear head and no LOC. pt. could not get up due to generalized wekaness and her  also could not pick her up, ambulance was called and pt. brought to the ER. pt. walks with cane. pt. is alert, awake , oriented x 3 in the ER and has no focal weakness, no speech / swallow difficulty. no cp, no sob. no abdominal pain, no n/v/d. denies any urinary symptoms. pt's tmax 100.9 in the ER, no tachycardia, bp stable.   pt. stated that she was diagnosed with breast cancer in 2015, had radiation treatment and is on po xeloda now 650 mg in am and 650 mg in pm. did not have any breast surgery, her radiation oncologist is dr. Maravilla at [facility], oncologist id dr. Galicia.    pt's ct head from yesterday : There is no evidence of an acute hemorrhage or mass-effect in the posterior fossa or in the supratentorial region. Interval development of scattered areas of vasogenic edema throughout the brain suspicious for underlying metastatic disease. Consider a nonemergent follow-up contrast-enhanced MRI of the brain for further evaluation.  Findings of ct scan discussed with pt. who does not want any further testing like MRI brain while here and plans to do that at her radiation oncologist Dr. Maravilla office upon discharge.   74 y/o female with h/o metastatic breast cancer was seen at [facility] ER yesterday as she was felt dizzy and was discharged from the ER after her blood work and ct head. Today pt. was brought back as she slid from her bed to the floor while getting oob. pt. did not hit hear head and no LOC. pt. is a good historian. pt. could not get up due to generalized wekaness and her  also could not pick her up, ambulance was called and pt. brought to the ER. pt. walks with cane. pt. is alert, awake , oriented x 3 in the ER and has no focal weakness, no speech / swallow difficulty. no cp, no sob. no abdominal pain, no n/v/d. denies any urinary symptoms. pt's tmax 100.9 in the ER, no tachycardia, bp stable.   pt. stated that she was diagnosed with breast cancer in 2015, had radiation treatment and is on po xeloda now 650 mg in am and 650 mg in pm. did not have any breast surgery, her radiation oncologist is dr. Maravilla at [facility], oncologist id dr. Galicia.    pt's ct head from yesterday : There is no evidence of an acute hemorrhage or mass-effect in the posterior fossa or in the supratentorial region. Interval development of scattered areas of vasogenic edema throughout the brain suspicious for underlying metastatic disease. Consider a nonemergent follow-up contrast-enhanced MRI of the brain for further evaluation.  Findings of ct scan discussed with pt. who does not want any further testing like MRI brain while here and plans to do that at her radiation oncologist Dr. Maravilla office upon discharge.

## 2021-09-23 NOTE — H&P ADULT - NSICDXPASTMEDICALHX_GEN_ALL_CORE_FT
PAST MEDICAL HISTORY:  Bacteremia     Breast cancer right breast    DVT of upper extremity (deep vein thrombosis) RIGHT    Essential hypertension     Heart murmur     Lymphedema     Risk factors for obstructive sleep apnea      PAST MEDICAL HISTORY:  Bacteremia     Breast cancer right breast    DVT of upper extremity (deep vein thrombosis) RIGHT, as per pt. had years ago , was on rivaroxaban back then but has been stopped for long time.    Essential hypertension     Heart murmur     Lymphedema     Risk factors for obstructive sleep apnea

## 2021-09-23 NOTE — H&P ADULT - NSHPPHYSICALEXAM_GEN_ALL_CORE
Vital Signs Last 24 Hrs  T(C): 38.2 (23 Sep 2021 19:52), Max: 38.3 (23 Sep 2021 14:18)  T(F): 100.7 (23 Sep 2021 19:52), Max: 100.9 (23 Sep 2021 14:18)  HR: 81 (23 Sep 2021 19:52) (80 - 83)  BP: 124/71 (23 Sep 2021 19:52) (119/69 - 129/71)  BP(mean): --  RR: 18 (23 Sep 2021 19:52) (18 - 20)  SpO2: 96% (23 Sep 2021 19:52) (95% - 97%)    General: Well developed. well nourished. not in distress  HEENT: AT, NC. PERRL. intact EOM. no throat erythema or exudate.   Neck: supple. no JVD. no palpable lymph nodes  Chest: CTA bilaterally  Heart: normal S1,S2. RRR. no heart murmur.  Abdomen: soft. non-tender. non-distended. + BS. no hernia or palpable masses.  Genital: not examined  Ext: no C/C/E. no calf tenderness.  Neuro: AAO x3. no focal weakness. no speech disorder  Skin: no rash Vital Signs Last 24 Hrs  T(C): 38.2 (23 Sep 2021 19:52), Max: 38.3 (23 Sep 2021 14:18)  T(F): 100.7 (23 Sep 2021 19:52), Max: 100.9 (23 Sep 2021 14:18)  HR: 81 (23 Sep 2021 19:52) (80 - 83)  BP: 124/71 (23 Sep 2021 19:52) (119/69 - 129/71)  BP(mean): --  RR: 18 (23 Sep 2021 19:52) (18 - 20)  SpO2: 96% (23 Sep 2021 19:52) (95% - 97%)    General: pt. in bed not in distress.  HEENT: AT, NC. PERRL. intact EOM. no throat erythema or exudate.   Neck: supple. no JVD.   Chest: CTA bilaterally, left upper CW port appears dry. intact, no surrounding erythema or discharge.   Heart: S1,S2. RRR. no heart murmur. no edema.   Abdomen: soft. non-tender. non-distended. + BS.   Ext: no calf tenderness. ROM of all ext. intact. distal pulses 2 +  Neuro: AAO x3. no focal weakness. no speech disorder, cns ii to xii intact. m /r/s intact.  Skin: no rash noted, warm and dry. no cyanosis.   psych : pleasant, co-operative, no si/hi.

## 2021-09-23 NOTE — ED ADULT TRIAGE NOTE - CHIEF COMPLAINT QUOTE
Pt BIBA, c/o weakness, EMS states pt was seen in ED last night and d/c home,  wanted pt to be evaluated again, port noted to left chest wall for chemo breast cancer, GCS 14, confused but states "I usually don't know that answer"

## 2021-09-23 NOTE — ED PROVIDER NOTE - OBJECTIVE STATEMENT
74 yo female with hx of breast ca with mets to brain presents ot the ED complaining of generalized weakness. Patient states that she has felt weak and tired for the past 2 weeks making if difficult to walk. Patient slid off bed this morning and was unable to stand up without help from EMS. Patient currently on oral chemo treatment. Follows with Dr. Bueno, oncology. Patient seen in ED yesterday and was comfortable with dc for outpt oncology follow up. Today weakness persists prompting return visit.

## 2021-09-23 NOTE — ED PROVIDER NOTE - ATTENDING CONTRIBUTION TO CARE
I performed a face to face history and physical exam of the patient and discussed their management with the student/resident/ACP. I reviewed the student/resident/ACP's note and agree with the documented findings and plan of care.    Pt with 2 wks of worsening weakness. Pt was here yesterday and evaluated and sent home but called her oncologist and was told to come back. no focal symptoms. unaware that she had a fever until now.  no n/v. no dysuria.    physical - rrr. ctab. abd - soft, nt. no edema. no rash.    plan - labs and imaging reviewed. will admit to medicine for further management.

## 2021-09-23 NOTE — H&P ADULT - ASSESSMENT
74 y/o female with h/o metastatic breast cancer was seen at [facility] ER yesterday as she was felt dizzy and was discharged from the ER after her blood work and ct head. Today pt. was brought back as she slid from her bed to the floor while getting oob. pt. did not hit hear head and no LOC. pt. could not get up due to generalized wekaness and her  also could not pick her up, ambulance was called and pt. brought to the ER. pt. walks with cane. pt. is alert, awake , oriented x 3 in the ER and has no focal weakness, no speech / swallow difficulty. no cp, no sob. no abdominal pain, no n/v/d. denies any urinary symptoms. pt's tmax 100.9 in the ER, no tachycardia, bp stable.     - generalized weakness, likely multifactorial, metastatic malignancy and low grade fever, infection  ? contributing, will request for nutrition consult. PT consult.     - acute febrile illness, source ? uti ? bacteremia ? follow blood , urine cx, will keep on zosyn empirically.    - metastatic breast cancer, pt's xeloda continued and plans to continue f/u with her oncologist Grayson Pittman. palliative care consult    - thrombocytopenia, possible Xeloda related, platelet 90, close f/u on cbc.    - transaminitis, liver mets and possible xeloda contributing, follow the trend.

## 2021-09-24 NOTE — CONSULT NOTE ADULT - SUBJECTIVE AND OBJECTIVE BOX
HPI: Patient is a 75y Female seen on consultation for the evaluation and management of metastatic right sided  breast cancer. ER pos and Her-2 pos, initially  diagnosed in  with met disease to lung.  Has known met disease to liver, bone and brain, s/p RT.  There has been suspicion for possible leptomeningeal disease, but has to date been asymptomatic.  Has received multiple chemotherapeutic regimens, initially with good response, but with breakthrough disease, most recently intolerant of last regimen and has been given Xeloda and Xgeva.  Liquid molecular testing done did not show original Her-2 and regimen switch from Xeloda planned.  Pt usually ambulates with cane; has not described weakness prior.  Admitted after fall-slipped to floor and couldn't get up.  Also c/o diplopia and headaches.  Ha slow grade fevers.  Denies nausea, vomiting or abd pain.  appetite fair, has lost weight.     PAST MEDICAL & SURGICAL HISTORY:  Breast cancer  right breast    DVT of upper extremity (deep vein thrombosis)  RIGHT, as per pt. had years ago , was on rivaroxaban back then but has been stopped for long time.    Lymphedema    Essential hypertension    Heart murmur    Bacteremia    Risk factors for obstructive sleep apnea    PICC (peripherally inserted central catheter) in place  Left chest wall catheter Inserted     S/P biopsy  R breast    S/P thoracentesis  3/2016        REVIEW OF SYSTEMS      General:weakness, poor appetite, weight loss	    Skin/Breast:Breast retracted by tumor on right side  	  Ophthalmologic:double vision  	  ENMT:	denies sore throat    Respiratory and Thorax: omhan SOB  	  Cardiovascular:	no chest pain or palpitations    Gastrointestinal:	denies nausea or vomiting    Genitourinary:	denies dysuria or hematuria    Musculoskeletal:	no bone pain    Neurological:	weakness, diplopia    Allergic/Immunologic:	    MEDICATIONS  (STANDING):  capecitabine 650 milliGRAM(s) Oral two times a day  cholecalciferol 2000 Unit(s) Oral daily  cyanocobalamin 1000 MICROGram(s) Oral daily  dexAMETHasone     Tablet 4 milliGRAM(s) Oral every 6 hours  lactobacillus acidophilus 1 Tablet(s) Oral three times a day with meals  levETIRAcetam 500 milliGRAM(s) Oral two times a day  piperacillin/tazobactam IVPB.. 3.375 Gram(s) IV Intermittent every 8 hours  pyridoxine 100 milliGRAM(s) Oral daily    MEDICATIONS  (PRN):  acetaminophen   Tablet .. 650 milliGRAM(s) Oral every 8 hours PRN Mild Pain (1 - 3), Moderate Pain (4 - 6)  ondansetron    Tablet 4 milliGRAM(s) Oral every 8 hours PRN Nausea and/or Vomiting      Allergies    No Known Allergies    Intolerances    jacquelyn Ensure TID (Unknown)      SOCIAL HISTORY:    Smoking Status: denied  Alcohol:denied  Marital Status:   Occupation:not working    FAMILY HISTORY:  Family history of cancer of frontal sinus (Father)  followed by enucleation    Family history of lung cancer (Father)    Family history of breast cancer in first degree relative (Sibling)  s/p mastectomy    FH: breast cancer (Sibling)              Vital Signs Last 24 Hrs  T(C): 36.9 (24 Sep 2021 15:58), Max: 38.2 (23 Sep 2021 19:52)  T(F): 98.4 (24 Sep 2021 15:58), Max: 100.7 (23 Sep 2021 19:52)  HR: 89 (24 Sep 2021 15:58) (72 - 91)  BP: 168/73 (24 Sep 2021 15:58) (94/60 - 168/73)  BP(mean): --  RR: 20 (24 Sep 2021 15:58) (18 - 20)  SpO2: 97% (24 Sep 2021 15:58) (96% - 100%)    PHYSICAL EXAM:      Constitutional:Appears weak, debilitated    Eyes: pale, aniceric      Neck:supple ,    Breasts: right breast retracted by tumor; left breast without mass or lesion        Respiratory:Clear     Cardiovascular:RRR normal S1S2    Gastrointestinal:Soft, non-tender, pos BS+        Extremities: UE edema      Neurological:awake, alert, non focal          LABS:                        9.9    5.38  )-----------( 69       ( 24 Sep 2021 07:25 )             29.4     09-24    139  |  104  |  12.2  ----------------------------<  118<H>  4.0   |  25.0  |  0.52    Ca    9.3      24 Sep 2021 07:25  Mg     1.6     09-22    TPro  6.3<L>  /  Alb  2.9<L>  /  TBili  2.8<H>  /  DBili  x   /  AST  159<H>  /  ALT  90<H>  /  AlkPhos  269<H>  09-24    PT/INR - ( 22 Sep 2021 20:48 )   PT: 13.6 sec;   INR: 1.18 ratio         PTT - ( 22 Sep 2021 20:48 )  PTT:35.7 sec  Urinalysis Basic - ( 22 Sep 2021 20:46 )    Color: Yellow / Appearance: Clear / S.015 / pH: x  Gluc: x / Ketone: Trace  / Bili: Negative / Urobili: 1 mg/dL   Blood: x / Protein: 15 mg/dL / Nitrite: Negative   Leuk Esterase: Small / RBC: 0-2 /HPF / WBC 0-2   Sq Epi: x / Non Sq Epi: Occasional / Bacteria: Occasional        RADIOLOGY & ADDITIONAL STUDIES:

## 2021-09-24 NOTE — PHYSICAL THERAPY INITIAL EVALUATION ADULT - PERTINENT HX OF CURRENT PROBLEM, REHAB EVAL
76 y/o female with h/o metastatic breast cancer was seen at [facility] ER yesterday as she was felt dizzy and was discharged from the ER after her blood work and ct head. Today pt. was brought back as she slid from her bed to the floor while getting oob.

## 2021-09-24 NOTE — PHYSICAL THERAPY INITIAL EVALUATION ADULT - LEVEL OF INDEPENDENCE: GAIT, REHAB EVAL
pt amb with straight cane 30' with minimal assist due to stagger and LOB. pt amb with RW supervision and verbal cues with improved gait and no LOB.

## 2021-09-24 NOTE — CONSULT NOTE ADULT - ASSESSMENT
74 yo female with metastatic breast cancer presents with weakness.       76 yo female with a history of metastatic breast cancer presents to [facility] with weakness.  Per admission notes, she slid from her bed to the floor while getting out of bed, was unable to get up due to general weakness and her  was unable to get her up, prompting a call to an ambulance. She did not hit her head, no LOC.  In the ED, patient was found with a tmax of 100.9.  Labs significant for thrombocytopenia, transaminitis.  On Zosyn empirically. Blood and urine cultures pending.  With intermittent chronic right breast pain, managed with acetaminophen, occasional nausea with Xeloda (dosed reduced in the setting of N/V/D).  Palliative care consulted for goals of care.    #metastatic breast cancer  -Medical management per primary team.  Recent presentation to [facility] ED prior to current admission.  CT results as above.  Patient wishes to pursue MRI of the brain at this time, following discussion with outpatient oncologist.  On Xeloda.    #fever  -UA results reviewed.  Blood and urine cultures pending.  On Zosyn empirically for possible UTI.  Monitor for signs and symptoms of infection.    #breast pain   -Related to malignancy.  Would add acetaminophen 650 mg PO q 8 hr PRN (pain).  Dose reduced in the setting of transaminitis     #nausea/vomiting  -occasional with Xeloda, takes Zofran for it at home.  Would start Zofran 4 mg PO/IV (if unable to tolerate PO) q 8 hr PRN (nausea/vomiting).  QTC: 424 ms. If ineffective, can increase to 8 mg PO/IV q 8 hr PRN (nausea/vomiting).    #debility  -supportive care.  Consider PT eval.    #palliative care encounter  -Introduced role of palliative care in symptom management, care planning, support, and transitions in care to those with serious illness.  Emotional support offered.  -Reviewed disease course, progression.  -Reviewed role of HCP.  Patient assigned HCP during this visit: , Andre, primary; son, Jacob, alternate.  HCP completed and placed in chart; copy was given to patient.  -In the setting of metastatic disease, explored goals of care, care preferences re: code status.  -No limitations on care at this time. Patient states that he trusts her , and that he will be able to make the decisions needed going forward.    Patient reviewed with Dr. Jairo RN.  Will follow.

## 2021-09-24 NOTE — CONSULT NOTE ADULT - ASSESSMENT
Imp:  76 yo female with metastatic breast cancer, known liver, bone lung, brain mets admitted with worsening weakness,  Also c/o diplopia concern for leptomeningeal spread.    Rec:  MRI of brain; possible LP for cytology  Outpt management in re:  addnl chemotherapy  Supportive care; receiving IV antibiotics for possible infection

## 2021-09-24 NOTE — PHYSICAL THERAPY INITIAL EVALUATION ADULT - ADDITIONAL COMMENTS
pt states she lives with her  in a 1-story house with no steps to enter then 2 inside (no rail). pt amb with cane, but c/o being unsteady. also has RW, shower chair, commode.  states he is home with pt.

## 2021-09-24 NOTE — CONSULT NOTE ADULT - SUBJECTIVE AND OBJECTIVE BOX
HPI:  76 y/o female with h/o metastatic breast cancer was seen at [facility] ER yesterday as she was felt dizzy and was discharged from the ER after her blood work and ct head. Today pt. was brought back as she slid from her bed to the floor while getting oob. pt. did not hit hear head and no LOC. pt. is a good historian. pt. could not get up due to generalized wekaness and her  also could not pick her up, ambulance was called and pt. brought to the ER. pt. walks with cane. pt. is alert, awake , oriented x 3 in the ER and has no focal weakness, no speech / swallow difficulty. no cp, no sob. no abdominal pain, no n/v/d. denies any urinary symptoms. pt's tmax 100.9 in the ER, no tachycardia, bp stable.   pt. stated that she was diagnosed with breast cancer in , had radiation treatment and is on po xeloda now 650 mg in am and 650 mg in pm. did not have any breast surgery, her radiation oncologist is dr. Maravilla at [facility], oncologist id dr. Galicai.    pt's ct head from yesterday : There is no evidence of an acute hemorrhage or mass-effect in the posterior fossa or in the supratentorial region. Interval development of scattered areas of vasogenic edema throughout the brain suspicious for underlying metastatic disease. Consider a nonemergent follow-up contrast-enhanced MRI of the brain for further evaluation.  Findings of ct scan discussed with pt. who does not want any further testing like MRI brain while here and plans to do that at her radiation oncologist Dr. Maravilla office upon discharge.   (23 Sep 2021 22:07)      HPI:    76 yo female with a history of metastatic breast cancer presents to [facility] with weakness.  Per admission notes, she slid from her bed to the floor while getting out of bed, was unable to get up due to general weakness and her  was unable to get her up, prompting a call to an ambulance. She did not hit her head, no LOC. Per admission note, patient was seen at [facility] ER on the day prior to presentation, as she felt dizzy, and was discharged from the ER after her blood work and ct head.  On presentation, no focal weakness, no speech/ swallow difficulties, no cp/sob, abdominal pain, n/v/d, urinary symptoms.   In the ED, patient was found with a tmax of 100.9, stable BP, no tachycardia.  Labs significant for thrombocytopenia, transaminitis.  Blood and urine cultures pending.    At baseline, patient walks with a cane. Patient diagnosed with breast cancer in , s/p RT on Xeloda 650 mg PO BID.  No surgery was done for her disease.  Oncologist is Dr. Galicia, radiation oncologist is Dr. Maravilla at [facility].    Pt's CT of her head from her prior presentation to the ED : There is no evidence of an acute hemorrhage or mass-effect in the posterior fossa or in the supratentorial region. Interval development of scattered areas of vasogenic edema throughout the brain suspicious for underlying metastatic disease. Consider a nonemergent follow-up contrast-enhanced MRI of the brain for further evaluation.  The findings of the CT scan discussed with pt.  Further testing declined here, patient plans on following up with Dr. Maravilla outpatient.      Per admission note, patient plans ongoing follow up with her oncologist, Dr. Galicia,  Palliative care consulted for goals of care.         PERTINENT PMH REVIEWED: Yes No    PAST MEDICAL & SURGICAL HISTORY:  Breast cancer  right breast    DVT of upper extremity (deep vein thrombosis)  RIGHT, as per pt. had years ago , was on rivaroxaban back then but has been stopped for long time.    Lymphedema    Essential hypertension    Heart murmur    Bacteremia    Risk factors for obstructive sleep apnea    PICC (peripherally inserted central catheter) in place  Left chest wall catheter Inserted     S/P biopsy  R breast    S/P thoracentesis  3/2016        SOCIAL HISTORY:                                     Admitted from:  home  SNF  [place]     Surrogate/HCP/Guardian: Phone#:    FAMILY HISTORY:  Family history of cancer of frontal sinus (Father)  followed by enucleation    Family history of lung cancer (Father)    Family history of breast cancer in first degree relative (Sibling)  s/p mastectomy    FH: breast cancer (Sibling)        Baseline ADLs (prior to admission):  Independent/ Dependent      Allergies    No Known Allergies    Intolerances    jacquelyn Ensure TID (Unknown)      Present Symptoms:     Dyspnea: 0 1 2 3   Nausea/Vomiting: Yes No  Anxiety:  Yes No  Depression: Yes No  Fatigue: Yes No  Loss of appetite: Yes No    Pain:             Character-            Duration-            Effect-            Factors-            Frequency-            Location-            Severity-    Review of Systems: Reviewed                     Negative:                     Positive:  Unable to obtain due to poor mentation   All others negative    MEDICATIONS  (STANDING):  capecitabine 650 milliGRAM(s) Oral two times a day  cholecalciferol 2000 Unit(s) Oral daily  cyanocobalamin 1000 MICROGram(s) Oral daily  heparin   Injectable 5000 Unit(s) SubCutaneous every 12 hours  lactobacillus acidophilus 1 Tablet(s) Oral three times a day with meals  piperacillin/tazobactam IVPB.. 3.375 Gram(s) IV Intermittent every 8 hours  pyridoxine 100 milliGRAM(s) Oral daily    MEDICATIONS  (PRN):      PHYSICAL EXAM:    Vital Signs Last 24 Hrs  T(C): 36.8 (24 Sep 2021 08:33), Max: 38.3 (23 Sep 2021 14:18)  T(F): 98.3 (24 Sep 2021 08:33), Max: 100.9 (23 Sep 2021 14:18)  HR: 89 (24 Sep 2021 08:33) (72 - 89)  BP: 110/60 (24 Sep 2021 08:33) (94/60 - 137/83)  BP(mean): --  RR: 20 (24 Sep 2021 08:33) (18 - 20)  SpO2: 97% (24 Sep 2021 08:33) (95% - 98%)    General: alert  oriented x ____ lethargic agitated                  cachexia  nonverbal  coma    Karnofsky:  %    HEENT: normal  dry mouth  ET tube/trach    Lungs: comfortable tachypnea/labored breathing  excessive secretions    CV: normal  tachycardia    GI: normal  distended  tender  no BS               PEG/NG/OG tube  constipation  last BM:     : normal  incontinent  oliguria/anuria  jean-baptiste    MSK: normal  weakness  edema             ambulatory  bedbound/wheelchair bound    Skin: normal  pressure ulcers- Stage_____  no rash    LABS:                        9.9    5.38  )-----------( 69       ( 24 Sep 2021 07:25 )             29.4     09-24    139  |  104  |  12.2  ----------------------------<  118<H>  4.0   |  25.0  |  0.52    Ca    9.3      24 Sep 2021 07:25  Mg     1.6     09-22    TPro  6.3<L>  /  Alb  2.9<L>  /  TBili  2.8<H>  /  DBili  x   /  AST  159<H>  /  ALT  90<H>  /  AlkPhos  269<H>  09-24    PT/INR - ( 22 Sep 2021 20:48 )   PT: 13.6 sec;   INR: 1.18 ratio         PTT - ( 22 Sep 2021 20:48 )  PTT:35.7 sec  Urinalysis Basic - ( 22 Sep 2021 20:46 )    Color: Yellow / Appearance: Clear / S.015 / pH: x  Gluc: x / Ketone: Trace  / Bili: Negative / Urobili: 1 mg/dL   Blood: x / Protein: 15 mg/dL / Nitrite: Negative   Leuk Esterase: Small / RBC: 0-2 /HPF / WBC 0-2   Sq Epi: x / Non Sq Epi: Occasional / Bacteria: Occasional      I&O's Summary      RADIOLOGY & ADDITIONAL STUDIES:    ADVANCE DIRECTIVES:   DNR YES NO  Completed on:                     MOLST  YES NO   Completed on:  Living Will  YES NO   Completed on:         HPI:  76 y/o female with h/o metastatic breast cancer was seen at [facility] ER yesterday as she was felt dizzy and was discharged from the ER after her blood work and ct head. Today pt. was brought back as she slid from her bed to the floor while getting oob. pt. did not hit hear head and no LOC. pt. is a good historian. pt. could not get up due to generalized wekaness and her  also could not pick her up, ambulance was called and pt. brought to the ER. pt. walks with cane. pt. is alert, awake , oriented x 3 in the ER and has no focal weakness, no speech / swallow difficulty. no cp, no sob. no abdominal pain, no n/v/d. denies any urinary symptoms. pt's tmax 100.9 in the ER, no tachycardia, bp stable.   pt. stated that she was diagnosed with breast cancer in , had radiation treatment and is on po xeloda now 650 mg in am and 650 mg in pm. did not have any breast surgery, her radiation oncologist is dr. Maravilla at [facility], oncologist id dr. Galicia.    pt's ct head from yesterday : There is no evidence of an acute hemorrhage or mass-effect in the posterior fossa or in the supratentorial region. Interval development of scattered areas of vasogenic edema throughout the brain suspicious for underlying metastatic disease. Consider a nonemergent follow-up contrast-enhanced MRI of the brain for further evaluation.  Findings of ct scan discussed with pt. who does not want any further testing like MRI brain while here and plans to do that at her radiation oncologist Dr. Maravilla office upon discharge.   (23 Sep 2021 22:07)      HPI:    76 yo female with a history of metastatic breast cancer presents to [facility] with weakness.  Per admission notes, she slid from her bed to the floor while getting out of bed, was unable to get up due to general weakness and her  was unable to get her up, prompting a call to an ambulance. She did not hit her head, no LOC. Per admission note, patient was seen at [facility] ER on the day prior to presentation, as she felt dizzy, and was discharged from the ER after her blood work and ct head.  On presentation, no focal weakness, no speech/ swallow difficulties, no cp/sob, abdominal pain, n/v/d, urinary symptoms.   In the ED, patient was found with a tmax of 100.9, stable BP, no tachycardia.  Labs significant for thrombocytopenia, transaminitis.  Blood and urine cultures pending.    At baseline, patient walks with a cane. Patient diagnosed with breast cancer in , s/p RT on Xeloda 650 mg PO BID.  No surgery was done for her disease.  Oncologist is Dr. Galicia, radiation oncologist is Dr. Maravilla at [facility].    Pt's CT of her head from her prior presentation to the ED : There is no evidence of an acute hemorrhage or mass-effect in the posterior fossa or in the supratentorial region. Interval development of scattered areas of vasogenic edema throughout the brain suspicious for underlying metastatic disease. Consider a nonemergent follow-up contrast-enhanced MRI of the brain for further evaluation.  The findings of the CT scan discussed with pt.  Further testing declined here, patient plans on following up with Dr. Maravilla outpatient.      Per admission note, patient plans ongoing follow up with her oncologist, Dr. Galicia,  Palliative care consulted for goals of care.     Patient reports that she slid off the edge of bed at home, and was nable to get up.  Offer that her  had a recent injury, preventing him from being able to get her off the floor.         PERTINENT PMH REVIEWED: Yes No    PAST MEDICAL & SURGICAL HISTORY:  Breast cancer  right breast    DVT of upper extremity (deep vein thrombosis)  RIGHT, as per pt. had years ago , was on rivaroxaban back then but has been stopped for long time.    Lymphedema    Essential hypertension    Heart murmur    Bacteremia    Risk factors for obstructive sleep apnea    PICC (peripherally inserted central catheter) in place  Left chest wall catheter Inserted     S/P biopsy  R breast    S/P thoracentesis  3/2016        SOCIAL HISTORY:  from home, lives with , no aide.      Surrogate/HCP/Guardian: Andre Montejo  Phone#: [phone]    FAMILY HISTORY:  Family history of cancer of frontal sinus (Father)  followed by enucleation    Family history of lung cancer (Father)    Family history of breast cancer in first degree relative (Sibling)  s/p mastectomy    FH: breast cancer (Sibling)        Baseline ADLs (prior to admission): independent wth ADLS, IADLs.   does the driving.  Patient has used a cane over the last month, "for security."  She takes her god on walks with her .  Independent/ Dependent      Allergies    No Known Allergies    Intolerances    jacquelyn Ensure TID (Unknown)      Present Symptoms:     Dyspnea: 0   Nausea/Vomiting: Occasionally with Xeloda, none now, take zofran forit, as needed.  Anxiety:  Yes   Depression: No  Fatigue: Yes  Loss of appetite: Fair    Pain:             Character-sore, non-radiating            Duration-chronic            Factors-not elicited by anything, managed with Tylenol (occasionally needed)            Frequency-intermittent            Location-right breast            Severity-not present: 0/10    Review of Systems: Reviewed                     Negative: dysuria, rash                     Positive: weakness, RUE lymphedema  Unable to obtain due to poor mentation   All others negative    MEDICATIONS  (STANDING):  capecitabine 650 milliGRAM(s) Oral two times a day  cholecalciferol 2000 Unit(s) Oral daily  cyanocobalamin 1000 MICROGram(s) Oral daily  heparin   Injectable 5000 Unit(s) SubCutaneous every 12 hours  lactobacillus acidophilus 1 Tablet(s) Oral three times a day with meals  piperacillin/tazobactam IVPB.. 3.375 Gram(s) IV Intermittent every 8 hours  pyridoxine 100 milliGRAM(s) Oral daily    MEDICATIONS  (PRN):      PHYSICAL EXAM:    Vital Signs Last 24 Hrs  T(C): 36.8 (24 Sep 2021 08:33), Max: 38.3 (23 Sep 2021 14:18)  T(F): 98.3 (24 Sep 2021 08:33), Max: 100.9 (23 Sep 2021 14:18)  HR: 89 (24 Sep 2021 08:33) (72 - 89)  BP: 110/60 (24 Sep 2021 08:33) (94/60 - 137/83)  BP(mean): --  RR: 20 (24 Sep 2021 08:33) (18 - 20)  SpO2: 97% (24 Sep 2021 08:33) (95% - 98%)      Karnofsky: 30 %  Gen: In NAD.  No pain behaviors or work of breathing noted  Neuro: alert and oriented, communicates needs.  Head: NC/AT  Eyes: sclerae non-icteric  Resp: unlabored  CV: S1, S2  Abd: soft, non-tender, + bowels sounds  : no jean-baptiste, ambulates to bathroom,  Peripheral Vasc: RUE lymphedema  Int: warm and dry  Psych: no psychomotor agitation    LABS:                        9.9    5.38  )-----------( 69       ( 24 Sep 2021 07:25 )             29.4         139  |  104  |  12.2  ----------------------------<  118<H>  4.0   |  25.0  |  0.52    Ca    9.3      24 Sep 2021 07:25  Mg     1.6         TPro  6.3<L>  /  Alb  2.9<L>  /  TBili  2.8<H>  /  DBili  x   /  AST  159<H>  /  ALT  90<H>  /  AlkPhos  269<H>      PT/INR - ( 22 Sep 2021 20:48 )   PT: 13.6 sec;   INR: 1.18 ratio         PTT - ( 22 Sep 2021 20:48 )  PTT:35.7 sec  Urinalysis Basic - ( 22 Sep 2021 20:46 )    Color: Yellow / Appearance: Clear / S.015 / pH: x  Gluc: x / Ketone: Trace  / Bili: Negative / Urobili: 1 mg/dL   Blood: x / Protein: 15 mg/dL / Nitrite: Negative   Leuk Esterase: Small / RBC: 0-2 /HPF / WBC 0-2   Sq Epi: x / Non Sq Epi: Occasional / Bacteria: Occasional      I&O's Summary      RADIOLOGY & ADDITIONAL STUDIES:    ADVANCE DIRECTIVES:   Total time spent in discussion: 18 min  HCP assigned  Full code orders         HPI:  76 y/o female with h/o metastatic breast cancer was seen at [facility] ER yesterday as she was felt dizzy and was discharged from the ER after her blood work and ct head. Today pt. was brought back as she slid from her bed to the floor while getting oob. pt. did not hit hear head and no LOC. pt. is a good historian. pt. could not get up due to generalized wekaness and her  also could not pick her up, ambulance was called and pt. brought to the ER. pt. walks with cane. pt. is alert, awake , oriented x 3 in the ER and has no focal weakness, no speech / swallow difficulty. no cp, no sob. no abdominal pain, no n/v/d. denies any urinary symptoms. pt's tmax 100.9 in the ER, no tachycardia, bp stable.   pt. stated that she was diagnosed with breast cancer in , had radiation treatment and is on po xeloda now 650 mg in am and 650 mg in pm. did not have any breast surgery, her radiation oncologist is dr. Maravilla at [facility], oncologist id dr. Galicia.    pt's ct head from yesterday : There is no evidence of an acute hemorrhage or mass-effect in the posterior fossa or in the supratentorial region. Interval development of scattered areas of vasogenic edema throughout the brain suspicious for underlying metastatic disease. Consider a nonemergent follow-up contrast-enhanced MRI of the brain for further evaluation.  Findings of ct scan discussed with pt. who does not want any further testing like MRI brain while here and plans to do that at her radiation oncologist Dr. Maravilla office upon discharge.   (23 Sep 2021 22:07)      HPI:    74 yo female with a history of metastatic breast cancer presents to [facility] with weakness.  Per admission notes, she slid from her bed to the floor while getting out of bed, was unable to get up due to general weakness and her  was unable to get her up, prompting a call to an ambulance. She did not hit her head, no LOC. Per admission note, patient was seen at [facility] ER on the day prior to presentation, as she felt dizzy, and was discharged from the ER after her blood work and ct head.  On her current presentation, no focal weakness, no speech/ swallow difficulties, no cp/sob, abdominal pain, n/v/d, urinary symptoms.   In the ED, patient was found with a tmax of 100.9, stable BP, no tachycardia.  Labs significant for thrombocytopenia, transaminitis.  Blood and urine cultures pending.  On zosyn empirically for ? UTI.    At baseline, patient walks with a cane.  Patient diagnosed with breast cancer in , s/p RT on Xeloda 650 mg PO BID.  No surgery was done for her disease.  Oncologist is Dr. Galicia, radiation oncologist is Dr. Maravilla at [facility].    Pt's CT of her head from her prior presentation to the ED : There is no evidence of an acute hemorrhage or mass-effect in the posterior fossa or in the supratentorial region. Interval development of scattered areas of vasogenic edema throughout the brain suspicious for underlying metastatic disease. Consider a nonemergent follow-up contrast-enhanced MRI of the brain for further evaluation.  The findings of the CT scan discussed with pt.  Further testing declined here, patient plans on following up with Dr. Maravilla outpatient.      Per admission note, patient plans ongoing follow up with her oncologist, Dr. Galicia.  Palliative care consulted for goals of care.     Patient reports that she slid off the edge of bed at home, and was unable to get up.  Patient offers that her  had a recent injury, preventing him from being able to get her off the floor.    -Patient states that she was diagnosed with right breast cancer in .  No surgery; she was initially treated with RT and systemic chemotherapy (reports that she was HER 2 + at the time of diagnosis, now per patient, no longer HER 2+).  Patient reports that her disease was never in remission and that she was found with disease in her brain. She states there has been POD through multiple treatments, and offers that her doctor is looking for a new medication for her.  -States that she spoke to her oncologist this morning, and that the oncologist recommends pursuit of the MRI of the brain. She is now amenable to the MRI of the brain.  -She offers that in the last month, she has been less steady on her feet, and she has started using a cane for "security." She is not certain that she communicated this to her oncologist before.  -She reports that her Xeloda was dose reduced 2/2 GI side effects of n/v/d.  -She reports a history of RUE lymphedema, and that her  has gone for training on how to massage her right arm.      PERTINENT PMH REVIEWED: Yes No    PAST MEDICAL & SURGICAL HISTORY:  Breast cancer  right breast    DVT of upper extremity (deep vein thrombosis)  RIGHT, as per pt. had years ago , was on rivaroxaban back then but has been stopped for long time.    Lymphedema    Essential hypertension    Heart murmur    Bacteremia    Risk factors for obstructive sleep apnea    PICC (peripherally inserted central catheter) in place  Left chest wall catheter Inserted     S/P biopsy  R breast    S/P thoracentesis  3/2016        SOCIAL HISTORY:  from home, lives with , no aide.      Surrogate/HCP/Guardian: Andre Montejo  Phone#: [phone]    FAMILY HISTORY:  Family history of cancer of frontal sinus (Father)  followed by enucleation    Family history of lung cancer (Father)    Family history of breast cancer in first degree relative (Sibling)  s/p mastectomy    FH: breast cancer (Sibling)        Baseline ADLs (prior to admission): independent h ADLS, IADLs.   does the driving.  Patient has used a cane over the last month, "for security."  She takes her dog on walks with her .      Allergies    No Known Allergies    Intolerances    jacquelyn Ensure TID (Unknown)      Present Symptoms:     Dyspnea: 0   Nausea/Vomiting: Occasionally with Xeloda, none now, take zofran for it, as needed.  Anxiety:  Yes   Depression: No  Fatigue: Yes  Loss of appetite: Fair    Pain:             Character-sore, non-radiating            Duration-chronic            Factors-not elicited by anything, managed with Tylenol (occasionally needed)            Frequency-intermittent            Location-right breast            Severity-not present: 0/10    Review of Systems: Reviewed                     Negative: dysuria, rash                     Positive: weakness, RUE lymphedema  Unable to obtain due to poor mentation   All others negative    MEDICATIONS  (STANDING):  capecitabine 650 milliGRAM(s) Oral two times a day  cholecalciferol 2000 Unit(s) Oral daily  cyanocobalamin 1000 MICROGram(s) Oral daily  heparin   Injectable 5000 Unit(s) SubCutaneous every 12 hours  lactobacillus acidophilus 1 Tablet(s) Oral three times a day with meals  piperacillin/tazobactam IVPB.. 3.375 Gram(s) IV Intermittent every 8 hours  pyridoxine 100 milliGRAM(s) Oral daily    MEDICATIONS  (PRN):      PHYSICAL EXAM:    Vital Signs Last 24 Hrs  T(C): 36.8 (24 Sep 2021 08:33), Max: 38.3 (23 Sep 2021 14:18)  T(F): 98.3 (24 Sep 2021 08:33), Max: 100.9 (23 Sep 2021 14:18)  HR: 89 (24 Sep 2021 08:33) (72 - 89)  BP: 110/60 (24 Sep 2021 08:33) (94/60 - 137/83)  BP(mean): --  RR: 20 (24 Sep 2021 08:33) (18 - 20)  SpO2: 97% (24 Sep 2021 08:33) (95% - 98%)      Karnofsky: 30 %  Gen: In NAD.  No pain behaviors or work of breathing noted  Neuro: alert and oriented, communicates needs.  Head: NC/AT  Eyes: sclerae non-icteric  Resp: unlabored  CV: S1, S2  Abd: soft, non-tender, + bowels sounds  : no jean-baptiste, ambulates to bathroom,  Peripheral Vasc: RUE lymphedema  Int: warm and dry  Psych: no psychomotor agitation    LABS:                        9.9    5.38  )-----------( 69       ( 24 Sep 2021 07:25 )             29.4     09-    139  |  104  |  12.2  ----------------------------<  118<H>  4.0   |  25.0  |  0.52    Ca    9.3      24 Sep 2021 07:25  Mg     1.6         TPro  6.3<L>  /  Alb  2.9<L>  /  TBili  2.8<H>  /  DBili  x   /  AST  159<H>  /  ALT  90<H>  /  AlkPhos  269<H>  09-24    PT/INR - ( 22 Sep 2021 20:48 )   PT: 13.6 sec;   INR: 1.18 ratio         PTT - ( 22 Sep 2021 20:48 )  PTT:35.7 sec  Urinalysis Basic - ( 22 Sep 2021 20:46 )    Color: Yellow / Appearance: Clear / S.015 / pH: x  Gluc: x / Ketone: Trace  / Bili: Negative / Urobili: 1 mg/dL   Blood: x / Protein: 15 mg/dL / Nitrite: Negative   Leuk Esterase: Small / RBC: 0-2 /HPF / WBC 0-2   Sq Epi: x / Non Sq Epi: Occasional / Bacteria: Occasional      I&O's Summary      RADIOLOGY & ADDITIONAL STUDIES: reviewed    ADVANCE DIRECTIVES:   Total time spent in discussion: 18 min  HCP assigned  Full code orders

## 2021-09-25 NOTE — DIETITIAN INITIAL EVALUATION ADULT. - PERTINENT LABORATORY DATA
09-25 Na139 mmol/L Glu 195 mg/dL<H> K+ 3.7 mmol/L Cr  0.73 mg/dL BUN 15.1 mg/dL Phos 1.9 mg/dL<L> Alb 2.8 g/dL<L> PAB n/a

## 2021-09-25 NOTE — DIETITIAN INITIAL EVALUATION ADULT. - ETIOLOGY
related to inability to meet sufficient protein-energy in setting of metastatic breast cancer with suspicious for brain mets and generalized weakness

## 2021-09-25 NOTE — DIETITIAN INITIAL EVALUATION ADULT. - PERTINENT MEDS FT
MEDICATIONS  (STANDING):  capecitabine 650 milliGRAM(s) Oral two times a day  cholecalciferol 2000 Unit(s) Oral daily  cyanocobalamin 1000 MICROGram(s) Oral daily  dexAMETHasone     Tablet 4 milliGRAM(s) Oral every 6 hours  lactobacillus acidophilus 1 Tablet(s) Oral three times a day with meals  levETIRAcetam 500 milliGRAM(s) Oral two times a day  piperacillin/tazobactam IVPB.. 3.375 Gram(s) IV Intermittent every 8 hours  pyridoxine 100 milliGRAM(s) Oral daily    MEDICATIONS  (PRN):  acetaminophen   Tablet .. 650 milliGRAM(s) Oral every 8 hours PRN Mild Pain (1 - 3), Moderate Pain (4 - 6)  ondansetron    Tablet 4 milliGRAM(s) Oral every 8 hours PRN Nausea and/or Vomiting

## 2021-09-25 NOTE — DIETITIAN INITIAL EVALUATION ADULT. - ORAL NUTRITION SUPPLEMENTS
ambulatory
Add Ensure Enlive TID to optimize po intake and provide an additional 350 kcal, 20g protein per serving.

## 2021-09-25 NOTE — DIETITIAN INITIAL EVALUATION ADULT. - OTHER INFO
75 year old female with a PMH of metastatic breast cancer was brought in for generalized weakness. CTH shows interval development of scattered areas of vasogenic edema throughout the brain suspicious for underlying metastatic disease. Pt reports fair appetite/po intake prior to admission and currently. States she is overall not a big eater, reports she usually eats 2 meals per day with occasional snacks in between. Reports UBW fluctuates between 140-144 lbs and denies any significant changes within the last year. Pt states she drinks Ensure at home and would like to receive here. NFPE conducted. Encouraged HBV protein sources and small, frequent meals. RD to follow up.

## 2021-09-26 NOTE — CHART NOTE - NSCHARTNOTEFT_GEN_A_CORE
Pt has power PICC left chest wall.  was being used for chemo. not being used for chemo anymore  Present on admission  CXR 9/23 Discussed with Radiologist at [facility] in [facility]  OK to use

## 2021-09-28 NOTE — PROVIDER CONTACT NOTE (HYPOGLYCEMIA EVENT) - NS PROVIDER CONTACT BACKGROUND-HYPO
Age: 75y    Gender: Female    POCT Blood Glucose:      eMAR:dexAMETHasone     Tablet   4 milliGRAM(s) Oral (09-28-21 @ 06:25)   4 milliGRAM(s) Oral (09-28-21 @ 01:15)   4 milliGRAM(s) Oral (09-27-21 @ 17:56)   4 milliGRAM(s) Oral (09-27-21 @ 14:41)

## 2021-09-28 NOTE — CHART NOTE - NSCHARTNOTEFT_GEN_A_CORE
Source: Patient [ ]  Family [ ]   other [x ]    Current Diet: Diet, Regular (09-23-21 @ 23:10)    Patient reports [ ] nausea  [ ] vomiting [ ] diarrhea [ ] constipation  [ ]chewing problems [ ] swallowing issues  [ ] other:     PO intake:  ~50%    Current Weight:   (9/23) 63.5 kg  No new weight  No edema documented    Pertinent Medications: MEDICATIONS  (STANDING):  capecitabine 650 milliGRAM(s) Oral two times a day  cholecalciferol 2000 Unit(s) Oral daily  cyanocobalamin 1000 MICROGram(s) Oral daily  dexAMETHasone     Tablet 4 milliGRAM(s) Oral every 6 hours  lactobacillus acidophilus 1 Tablet(s) Oral three times a day with meals  levETIRAcetam 500 milliGRAM(s) Oral two times a day  pyridoxine 100 milliGRAM(s) Oral daily    MEDICATIONS  (PRN):  acetaminophen   Tablet .. 650 milliGRAM(s) Oral every 8 hours PRN Mild Pain (1 - 3), Moderate Pain (4 - 6)  ondansetron    Tablet 4 milliGRAM(s) Oral every 8 hours PRN Nausea and/or Vomiting    Pertinent Labs: N/A      Skin: intact per documentation    Nutrition focused physical exam previously conducted - found signs of malnutrition [ ]absent [x ]present    Subcutaneous fat loss: [x ] Orbital fat pads region, [ ]Buccal fat region, [x ]Triceps region,  [ ]Ribs region    Muscle wasting: [x ]Temples region, [ x]Clavicle region, [ x]Shoulder region, [ ]Scapula region, [ ]Interosseous region,  [ ]thigh region, [ ]Calf region    Estimated Needs:   [ x] no change since previous assessment  [ ] recalculated:       Current Nutrition Diagnosis: Pt remains at high nutrition risk secondary to malnutrition (moderate, chronic) related to inability to meet sufficient protein-energy in setting of metastatic breast cancer with brain mets and generalized weakness as evidenced by meeting <75% nutrient needs >1 month, moderate muscle loss of temples, clavicles, shoulders, and moderate fat loss of orbitals and buccal pads. Pt NPO since midnight; S/p CT head/abdomen and MRI brain results, noted +multiple areas of mets. Pt remains with fair appetite. RD to follow up.    Recommendations:   1) Resume po diet as medically feasible/tolerable.  2) Add Ensure Enlive TID to optimize po intake and provide an additional 350 kcal, 20g protein per serving.  3) Encourage po intake, monitor diet tolerance, and provide assistance at meals as needed.  4) Obtain daily weights to monitor trends.     Monitoring and Evaluation:   [ ] PO intake [ ] Tolerance to diet prescription [X] Weights  [X] Follow up per protocol [X] Labs

## 2021-09-28 NOTE — BRIEF OPERATIVE NOTE - NSICDXBRIEFPROCEDURE_GEN_ALL_CORE_FT
PROCEDURES:  Diagnostic lumbar puncture with fluoroscopic guidance 28-Sep-2021 12:33:49  Apolinar Hardy

## 2021-09-28 NOTE — PROGRESS NOTE ADULT - NUTRITIONAL ASSESSMENT
This patient has been assessed with a concern for Malnutrition and has been determined to have a diagnosis/diagnoses of Moderate protein-calorie malnutrition.    This patient is being managed with:   Diet Regular-  Entered: Sep 23 2021 11:00PM    

## 2021-09-29 NOTE — PROGRESS NOTE ADULT - ASSESSMENT
76 y/o F w/ a PMH of metastatic breast cancer (on xeloda) was brought in for generalized weakness.  Pt reports sliding out of bed and was unable to get up from weakness.  She was evaluated in ED day prior to admission for dizziness and was d/c'd home.   On admission tmax noted to be 100.9 but remainder of VS stable.  Pt today reporting unsteady gait and double vision for the past few days and CT from recent ED visit reviewed and noted to have vasogenic edema; MRI brain pending.         Generalized weakness, unsteady gait and double vision likely 2/2 progressing metastatic breast CA   - CTH from ER visit on 9/22 shows interval development of scattered areas of vasogenic edema throughout the brain suspicious for underlying metastatic disease  - MRI brain w/ and w/out contrast and pt started on keppra q12h for seizure ppx and ct on decadron 4mg q6h   - Will d/c heparin sq for vte ppx pending MRI brain results and place on SCDs for now   - On Xeloda for breast CA  - Pts oncologist is Dr. Edilberto Galicia - per onc - possibly LP for cytology  - PT consulted and evaluation noted  - Palliative consulted and recommendations noted      Acute febrile illness etiology unclear at this time  - Pan cultured and results pending  - No longer febrile and WBC WNL  - Continue emperic zosyn pending culture results  - Monitor CBC and trend temperature curve      Macrocytic anemia / Thrombocytopenia  - Possibly due to Xeloda   - Will order iron studies and B12/folate levels  - H/H and plts trending down but hemodynamically stable and without active bleeding  - Monitor CBC and transfuse if Hb <7  - Trend platelets and transfuse if <10K      Transaminitis and hyperbilirubinemia  - Likely multifactorial 2/2 known liver mets and Xeloda   - Benign abd exam and asymptomatic   - Monitor LFTs   - Avoid hepatotoxic medications       VTE ppx: heparin dc'd pending MRI brain results; on SCDs for now    Dispo: Pending clinical course.     
76 y/o F w/ a PMH of metastatic breast cancer (on xeloda) was brought in for generalized weakness.  Pt reports sliding out of bed and was unable to get up from weakness.  She was evaluated in ED day prior to admission for dizziness and was d/c'd home.   On admission tmax noted to be 100.9 but remainder of VS stable.  Pt today reporting unsteady gait and double vision for the past few days and CT from recent ED visit reviewed and noted to have vasogenic edema; MRI brain pending.         Generalized weakness, unsteady gait and double vision likely 2/2 progressing metastatic breast CA   - CTH from ER visit on 9/22 shows interval development of scattered areas of vasogenic edema throughout the brain suspicious for underlying metastatic disease  - MRI brain w/ and w/out contrast ordered and pt started on keppra q12h for seizure ppx and will start on decadron 4mg q6h   - Will d/c heparin sq for vte ppx pending MRI brain results and place on SCDs for now   - On Xeloda for breast CA  - Pts oncologist is Dr. Norberto Galicia  - PT consulted and evaluation noted  - Palliative consulted and recommendations noted      Acute febrile illness etiology unclear at this time  - Pan cultured and results pending  - No longer febrile and WBC WNL  - Continue emperic zosyn pending culture results  - Monitor CBC and trend temperature curve      Macrocytic anemia / Thrombocytopenia  - Possibly due to Xeloda   - Will order iron studies and B12/folate levels  - H/H and plts trending down but hemodynamically stable and without active bleeding  - Monitor CBC and transfuse if Hb <7  - Trend platelets and transfuse if <10K      Transaminitis and hyperbilirubinemia  - Likely multifactorial 2/2 known liver mets and Xeloda   - Benign abd exam and asymptomatic   - Monitor LFTs   - Avoid hepatotoxic medications       VTE ppx: heparin dc'd pending MRI brain results; on SCDs for now    Dispo: Pending clinical course.     
Imp:  76 yo female with metastatic breast cancer, known liver, bone lung, brain mets admitted with worsening weakness,  Also c/o diplopia concern for leptomeningeal spread.    Rec:  MRI of brain pending; possible LP for cytology  Outpt management in re:  addnl chemotherapy  Supportive care; receiving IV antibiotics for possible infection  Pancytopenia - stable, close to range as outpatient.
75yr woman hx of metastatic breast cancer, admitted for weakness, double vision with vasogenic edema on CTH concern for metastatic disease    1.  Metastatic breast cancer  Noted CT head/abdomen   MRI brain results noted + Multiple areas of mets    2.  Nausea  Zofran IV PRN    3.  Fever  cont IV abx  follow up cultures    4. Encounter for Palliative Care  Patient reports feeling better.   Informed her of MRI results.  She stated that she expected it.  She states she had 2 lesions in her brain last December that was radiated.  I informed her that unfortunately there are several lesions now. She plans to follow up with Dr. Galicia for further treatment options.   Emotional support  Plan for further C discussions  
Imp:  76 yo female with metastatic breast cancer, known liver, bone lung, brain mets admitted with worsening weakness,  Also c/o diplopia concern for leptomeningeal spread.    Rec:  MRI of brain done tonight.  Rec:  LP for cytology  Outpt management in re:  addnl chemotherapy  Supportive care; receiving IV antibiotics for possible infection
Imp:  76 yo female with metastatic breast cancer, known liver, bone lung, brain mets admitted with worsening weakness,  Also c/o diplopia concern for leptomeningeal spread.    Rec:  MRI of brain reviewed;    LP done via IR; awaiting results  Outpt management in re:  addnl chemotherapy  Supportive care; receiving IV antibiotics for possible infection  Optimally would have pt discharged.  I will arrange RT at [facility] to [facility] pt. 
74 y/o F w/ a PMH of metastatic breast cancer (on xeloda) was brought in for generalized weakness.  Pt reports sliding out of bed and was unable to get up from weakness.  She was evaluated in ED day prior to admission for dizziness and was d/c'd home.   On admission tmax noted to be 100.9 but remainder of VS stable.  Pt today reporting unsteady gait and double vision for the past few days and CT from recent ED visit reviewed and noted to have vasogenic edema; MRI brain pending.         Generalized weakness, unsteady gait and double vision likely 2/2 progressing metastatic breast CA   - CTH from ER visit on 9/22 shows interval development of scattered areas of vasogenic edema throughout the brain suspicious for underlying metastatic disease  - MRI brain w/ and w/out contrast and pt started on keppra q12h for seizure ppx and ct on decadron 4mg q6h   - Will d/c heparin sq for vte ppx pending MRI brain results and place on SCDs for now   - On Xeloda for breast CA  - Pts oncologist is Dr. Edilberto Galicia - per onc - possibly LP for cytology  - PT consulted and evaluation noted  - Palliative consulted and recommendations noted      Acute febrile illness etiology unclear at this time  - Cultures negative   - No longer febrile and WBC WNL      Macrocytic anemia / Thrombocytopenia  - Possibly due to Xeloda   - Will order iron studies and B12/folate levels  - H/H and plts trending down but hemodynamically stable and without active bleeding  - Monitor CBC and transfuse if Hb <7  - Trend platelets and transfuse if <10K      Transaminitis and hyperbilirubinemia  - Likely multifactorial 2/2 known liver mets and Xeloda   - Benign abd exam and asymptomatic   - Monitor LFTs   - Avoid hepatotoxic medications       VTE ppx: heparin dc'd pending MRI brain results; on SCDs for now    Dispo: Pending clinical course.     
75yr woman hx of metastatic breast cancer, admitted for weakness, double vision with vasogenic edema on CTH concern for metastatic disease    1.  Metastatic breast cancer  Noted CT head/abdomen   MRI brain pending eval for mets    2.  Nausea  Zofran IV PRN    3.  Fever  cont IV abx  follow up cultures    4. Encounter for Palliative Care  Patient states had previous radiation to brain.  Symptoms currently managed.  Patient refused Decadron this am, concerned about long term side effects to her bone.  She reports had issues with her hip  in the past and  needed surgery  Informed patient rationale for Decadron - edema in brain, current benefits outweigh  risks of long term side effects.   Patient agreeable to taking medication.   Awaiting MRI - plan for further GOC discussions. 
75yr woman hx of metastatic breast cancer, admitted for weakness, double vision with vasogenic edema on CTH concern for metastatic disease    1.  Metastatic breast cancer  Noted CT head/abdomen   MRI brain results noted + Multiple areas of mets  Plan to follow up with Rad/onc and oncology    2.  Nausea  better  cont Decadron per medicine    3.  Fever  better  cultures neg    4. Encounter for Palliative Care  Patient states she is feeling better and will be following up with her radiation oncologist and Dr. Galicia for further treatment.   Tried to inform her that if treatments become too burdensome and intolerable that she can focus on her comfort.  She states that she usually does okay with her treatments.  Discussed advance directives- CPR, intubation/mech ventilation.  She states her  is her health care proxy and they have had discussions about this, and would know what to do.   I encouraged her to clearly express her wishes to her family, so they don't have the burden to make such  decisions.   Patient vague in her responses.  Did not feel patient open to further discussions.  Emotional support.   Palliative Care to sign off      
Imp:  74 yo female with metastatic breast cancer, known liver, bone lung, brain mets admitted with worsening weakness,  Also c/o diplopia concern for leptomeningeal spread.    Rec:  MRI of brain reviewed;    LP done today via IR  Outpt management in re:  addnl chemotherapy  Supportive care; receiving IV antibiotics for possible infection  Optimally would have pt discharged.  I will arrange RT at [facility] to see pt. 
74 y/o F w/ a PMH of metastatic breast cancer (on xeloda) was brought in for generalized weakness.  Pt reports sliding out of bed and was unable to get up from weakness.  She was evaluated in ED day prior to admission for dizziness and was d/c'd home.   On admission tmax noted to be 100.9 but remainder of VS stable.  Pt today reporting unsteady gait and double vision for the past few days and CT from recent ED visit reviewed and noted to have vasogenic edema; MRI brain s/o Diffuse supratentorial and infratentorial metastatic disease with associated areas of vasogenic edema. Osseous metastasis.        unsteady gait and double vision likely 2/2 progressing metastatic breast CA with vasogenic edema  - CTH from ER visit on 9/22 shows interval development of scattered areas of vasogenic edema throughout the brain suspicious for underlying metastatic disease  - MRI brain w/ and w/out contrast and pt started on keppra q12h for seizure ppx and ct on decadron 4mg q6h   - On Xeloda for breast CA  - Pts oncologist is Dr. Edilberto Galicia - per onc - LP for cytology done, f/u results - await hem recs   - PT consulted and evaluation noted  - Palliative consulted and recommendations noted  VTE px once LP done       Acute febrile illness etiology unclear at this time  - Cultures negative   - No longer febrile and WBC WNL      Macrocytic anemia / Thrombocytopenia  - Possibly due to Xeloda   - Will order iron studies and B12/folate levels  - H/H and plts trending down but hemodynamically stable and without active bleeding  - Monitor CBC and transfuse if Hb <7  - Trend platelets and transfuse if <10K      Transaminitis and hyperbilirubinemia  - Likely multifactorial 2/2 known liver mets and Xeloda   - Benign abd exam and asymptomatic   - Monitor LFTs   - Avoid hepatotoxic medications       VTE ppx: heparin dc'd pending MRI brain results; on SCDs for now    Dispo: Pending LP results, Hem recs   DC planning likely in next 24-48hrs    
76 y/o F w/ a PMH of metastatic breast cancer (on xeloda) was brought in for generalized weakness.  Pt reports sliding out of bed and was unable to get up from weakness.  She was evaluated in ED day prior to admission for dizziness and was d/c'd home.   On admission tmax noted to be 100.9 but remainder of VS stable.  Pt today reporting unsteady gait and double vision for the past few days and CT from recent ED visit reviewed and noted to have vasogenic edema; MRI brain pending.         Generalized weakness, unsteady gait and double vision likely 2/2 progressing metastatic breast CA   - CTH from ER visit on 9/22 shows interval development of scattered areas of vasogenic edema throughout the brain suspicious for underlying metastatic disease  - MRI brain w/ and w/out contrast and pt started on keppra q12h for seizure ppx and ct on decadron 4mg q6h   - Will d/c heparin sq for vte ppx pending MRI brain results and place on SCDs for now   - On Xeloda for breast CA  - Pts oncologist is Dr. Edilberto Galicia - per onc - possibly LP for cytology  - PT consulted and evaluation noted  - Palliative consulted and recommendations noted      Acute febrile illness etiology unclear at this time  - Cultures negative   - No longer febrile and WBC WNL  - will Dc antibiotics , no obvious source of fever   - Monitor CBC and trend temperature curve      Macrocytic anemia / Thrombocytopenia  - Possibly due to Xeloda   - Will order iron studies and B12/folate levels  - H/H and plts trending down but hemodynamically stable and without active bleeding  - Monitor CBC and transfuse if Hb <7  - Trend platelets and transfuse if <10K      Transaminitis and hyperbilirubinemia  - Likely multifactorial 2/2 known liver mets and Xeloda   - Benign abd exam and asymptomatic   - Monitor LFTs   - Avoid hepatotoxic medications       VTE ppx: heparin dc'd pending MRI brain results; on SCDs for now    Dispo: Pending clinical course.

## 2021-09-29 NOTE — DISCHARGE NOTE PROVIDER - NSDCMRMEDTOKEN_GEN_ALL_CORE_FT
acetaminophen 325 mg oral tablet: 2 tab(s) orally every 8 hours, As needed, Mild Pain (1 - 3), Moderate Pain (4 - 6)  dexamethasone 4 mg oral tablet: 1 tab(s) orally every 6 hours  levETIRAcetam 500 mg oral tablet: 1 tab(s) orally 2 times a day  Vitamin B-12 1000 mcg oral tablet: 1 tab(s) orally once a day  Vitamin B6 100 mg oral tablet: 1 tab(s) orally once a day  Vitamin D3 2000 intl units oral tablet: 1 tab(s) orally once a day  Xeloda: orally once a day in the evening  Xeloda 150 mg oral tablet: orally once a day in am

## 2021-09-29 NOTE — DISCHARGE NOTE PROVIDER - HOSPITAL COURSE
75y Female seen on consultation for the evaluation and management of metastatic right sided  breast cancer. ER pos and Her-2 pos, initially  diagnosed in 2015 with met disease to lung.  Has known met disease to liver, bone and brain, s/p RT.  There has been suspicion for possible leptomeningeal disease per oncology. Pt usually ambulates with cane; has not described weakness prior.  Admitted after fall-slipped to floor and couldn't get up.  Also c/o diplopia and headaches.  Had low grade fever on admission . MRI showed diffuse supratentorial and infratentorial brain mets.  LP done; awaiting results.  She was seen by oncology and palliative care, pt wants to ct treatment with oncology who recommend outpt followup. SHe was worked up for fever, which was negative, empirically treated with iv zosyn, which was discontinued after cultures came back negative and patient remained afebrile. she was seen by PT - who recommend home with assistance.     Vital Signs Last 24 Hrs  T(C): 36.6 (29 Sep 2021 08:07), Max: 36.7 (28 Sep 2021 21:26)  T(F): 97.9 (29 Sep 2021 08:07), Max: 98.1 (28 Sep 2021 21:26)  HR: 99 (29 Sep 2021 08:07) (67 - 99)  BP: 121/74 (29 Sep 2021 08:07) (117/60 - 126/65)  BP(mean): --  RR: 18 (29 Sep 2021 08:07) (18 - 20)  SpO2: 96% (29 Sep 2021 08:07) (96% - 99%)    PHYSICAL EXAM:    GENERAL: NAD, well-groomed  HEAD:  Atraumatic, Normocephalic  EYES: EOMI, PERRLA, conjunctiva and sclera clear  ENMT: No tonsillar erythema, exudates, or enlargement; Moist mucous membranes, Good dentition, No lesions  NECK: Supple, No JVD  NERVOUS SYSTEM:  Alert & Oriented X3, Good concentration  CHEST/LUNG: Clear to percussion bilaterally; No rales, rhonchi  HEART: Regular rate and rhythm; No murmurs  ABDOMEN: Soft, Nontender, Nondistended; Bowel sounds present  EXTREMITIES:   No clubbing, cyanosis, or edema    Time spent in discharge planning and co-ordination : 45min

## 2021-09-29 NOTE — DISCHARGE NOTE PROVIDER - NSDCCPCAREPLAN_GEN_ALL_CORE_FT
PRINCIPAL DISCHARGE DIAGNOSIS  Diagnosis: Carcinoma of breast metastatic to brain  Assessment and Plan of Treatment:       SECONDARY DISCHARGE DIAGNOSES  Diagnosis: Fever  Assessment and Plan of Treatment:

## 2021-09-29 NOTE — DISCHARGE NOTE NURSING/CASE MANAGEMENT/SOCIAL WORK - NSDCPEFALRISK_GEN_ALL_CORE
For information on Fall & injury Prevention, visit [email or web address]
Walking-Indoors allowed/Showering allowed/Do not drive or operate machinery/Sex allowed/Stairs allowed/No Heavy lifting/straining/Walking-Outdoors allowed/Do not make important decisions

## 2021-09-29 NOTE — PROGRESS NOTE ADULT - PROVIDER SPECIALTY LIST ADULT
Hospitalist
Intervent Radiology
Palliative Care
Hospitalist
Heme/Onc
Palliative Care
Palliative Care

## 2021-09-29 NOTE — DISCHARGE NOTE PROVIDER - CARE PROVIDER_API CALL
Mark Galicia)  Medical Oncology  [address]  [place] [address]  Phone: [phone]  Fax: [phone]  Follow Up Time:

## 2021-09-29 NOTE — PROGRESS NOTE ADULT - SUBJECTIVE AND OBJECTIVE BOX
CC: follow up GOC, symptoms    OVERNIGHT EVENTS:  feeling better  Present Symptoms:     Dyspnea: no  Nausea/Vomiting: No  Anxiety:   No  Depression: No  Fatigue: Yes   Loss of appetite: Yes   Constipation: no    Pain: denies            Character-            Duration-            Effect-            Factors-            Frequency-            Location-            Severity-    Pain AD Score:  [email or web address] (press ctrl + left click to view)    Review of Systems: Reviewed as above                  All others negative    MEDICATIONS  (STANDING):  capecitabine 650 milliGRAM(s) Oral two times a day  cholecalciferol 2000 Unit(s) Oral daily  cyanocobalamin 1000 MICROGram(s) Oral daily  dexAMETHasone     Tablet 4 milliGRAM(s) Oral every 6 hours  enoxaparin Injectable 40 milliGRAM(s) SubCutaneous daily  lactobacillus acidophilus 1 Tablet(s) Oral three times a day with meals  levETIRAcetam 500 milliGRAM(s) Oral two times a day  pyridoxine 100 milliGRAM(s) Oral daily    MEDICATIONS  (PRN):  acetaminophen   Tablet .. 650 milliGRAM(s) Oral every 8 hours PRN Mild Pain (1 - 3), Moderate Pain (4 - 6)  ondansetron    Tablet 4 milliGRAM(s) Oral every 8 hours PRN Nausea and/or Vomiting      PHYSICAL EXAM:    Vital Signs Last 24 Hrs  T(C): 36.6 (29 Sep 2021 08:07), Max: 36.7 (28 Sep 2021 21:26)  T(F): 97.9 (29 Sep 2021 08:07), Max: 98.1 (28 Sep 2021 21:26)  HR: 99 (29 Sep 2021 08:07) (67 - 99)  BP: 121/74 (29 Sep 2021 08:07) (117/60 - 126/65)  BP(mean): --  RR: 18 (29 Sep 2021 08:07) (18 - 20)  SpO2: 96% (29 Sep 2021 08:07) (96% - 99%)    General: alert  oriented x __4__ lethargic agitated                  cachexia  nonverbal  coma    Karnofsky: 50 %    HEENT: normal    Lungs: comfortable  CV: normal    GI: normal   : normal   MSK: normal    Skin: normal      LABS:                          9.7    5.71  )-----------( 108      ( 28 Sep 2021 14:36 )             30.0     09-28    143  |  105  |  21.4<H>  ----------------------------<  150<H>  4.7   |  27.0  |  0.52    Ca    9.9      28 Sep 2021 14:36          I&O's Summary      RADIOLOGY & ADDITIONAL STUDIES:  < from: MR Head w/wo IV Cont (09.27.21 @ 19:51) >  EXAM:  MR BRAIN WAW IC                          PROCEDURE DATE:  09/27/2021          INTERPRETATION:  CLINICAL INDICATIONS: ?hx blurred vision, history of metastatic breast cancer    COMPARISON: CT head dated 9/22/2021. MRI brain dated 5/4/2021. PET/CT dated 5/20/2021    TECHNIQUE: MRI brain: Multiplanar, multisequence MR imaging of the brain are obtained with and without the administration of 6 cc intravenous Gadavist contrast. 1.5 cc of contrast was discarded.    FINDINGS:    Numerous diffuse supratentorial tentorial enhancing nodules compatible with metastatic disease. The largest nodule in the right frontal lobe measures 1.1 cm. The largest nodule in the left frontal lobe measures 1.1 cm. The largest nodule in the right parietal lobe measures 8.3 mm. The largest nodule in the right occipital lobe measures 8.6 mm. The largest nodule in the right temporal lobe measures 1.5 cm. Posterior right pontine nodule measures 1.6 cm the largest nodule in the left temporal lobe measures 7 mm. Largest nodule left occipital lobe measures 3.5 mm. The largest nodule in the right cerebellar hemisphere measures 1.5 cm. Moderate to severe edema throughout the bilateral frontal lobes, right parietal lobe, right temporal lobe, brainstem, right cerebellar hemisphere bilateral occipital lobes.    Possible skull base metastasis involving the left sphenoid bone on image 9 of series 5.    There is no abnormal restricted diffusion to suggest acute infarction. . Normal T2 flow-voids are seen within  the intracranial vasculature. The lateral ventricles and cortical sulci are age-appropriate in size and configuration.  There is no susceptibility artifact to suggest hemorrhage. Midline structures are normal.  The patient is status post bilateral ocular lens replacement surgery. The visualized paranasal sinuses, mastoid air cells and orbits are otherwise unremarkable.    Abnormal scattered low T1 signal in the cervical spine suspicious for osseous metastasis. Further evaluation is recommended.    IMPRESSION: Diffuse supratentorial and infratentorial metastatic disease with associated areas of vasogenic edema. Osseous metastasis.      < end of copied text >      ADVANCE DIRECTIVES/TREATMENT PREFERENCES:  DNR  NO  Completed on:                     MOLST   NO   Completed on:  Living Will   NO   Completed on:
HPI: Patient is a 75y Female seen on consultation for the evaluation and management of metastatic right sided  breast cancer. ER pos and Her-2 pos, initially  diagnosed in  with met disease to lung.  Has known met disease to liver, bone and brain, s/p RT.  There has been suspicion for possible leptomeningeal disease, but has to date been asymptomatic.  Has received multiple chemotherapeutic regimens, initially with good response, but with breakthrough disease, most recently intolerant of last regimen and has been given Xeloda and Xgeva.  Liquid molecular testing done did not show original Her-2 and regimen switch from Xeloda planned.  Pt usually ambulates with cane; has not described weakness prior.  Admitted after fall-slipped to floor and couldn't get up.  Also c/o diplopia and headaches.  Ha slow grade fevers.  Denies nausea, vomiting or abd pain.  appetite fair, has lost weight.   Still c/o diplopia.  LE weakness improved  Results of MRI reviewed:  diffuse supratentorial and infratentorial brain mets.  LP done    PAST MEDICAL & SURGICAL HISTORY:  Breast cancer  right breast    DVT of upper extremity (deep vein thrombosis)  RIGHT, as per pt. had years ago , was on rivaroxaban back then but has been stopped for long time.    Lymphedema    Essential hypertension    Heart murmur    Bacteremia    Risk factors for obstructive sleep apnea    PICC (peripherally inserted central catheter) in place  Left chest wall catheter Inserted     S/P biopsy  R breast    S/P thoracentesis  3/2016        REVIEW OF SYSTEMS      General:weakness, poor appetite, weight loss	    Skin/Breast:Breast retracted by tumor on right side  	  Ophthalmologic:double vision  	  ENMT:	denies sore throat    Respiratory and Thorax: mohan SOB  	  Cardiovascular:	no chest pain or palpitations    Gastrointestinal:	denies nausea or vomiting    Genitourinary:	denies dysuria or hematuria    Musculoskeletal:	no bone pain    Neurological:	weakness, diplopia    Allergic/Immunologic:	    MEDICATIONS  (STANDING):  capecitabine 650 milliGRAM(s) Oral two times a day  cholecalciferol 2000 Unit(s) Oral daily  cyanocobalamin 1000 MICROGram(s) Oral daily  dexAMETHasone     Tablet 4 milliGRAM(s) Oral every 6 hours  lactobacillus acidophilus 1 Tablet(s) Oral three times a day with meals  levETIRAcetam 500 milliGRAM(s) Oral two times a day  piperacillin/tazobactam IVPB.. 3.375 Gram(s) IV Intermittent every 8 hours  pyridoxine 100 milliGRAM(s) Oral daily    MEDICATIONS  (PRN):  acetaminophen   Tablet .. 650 milliGRAM(s) Oral every 8 hours PRN Mild Pain (1 - 3), Moderate Pain (4 - 6)  ondansetron    Tablet 4 milliGRAM(s) Oral every 8 hours PRN Nausea and/or Vomiting      Allergies    No Known Allergies    Intolerances    jacquelyn Ensure TID (Unknown)      SOCIAL HISTORY:    Smoking Status: denied  Alcohol:denied  Marital Status:   Occupation:not working    FAMILY HISTORY:  Family history of cancer of frontal sinus (Father)  followed by enucleation    Family history of lung cancer (Father)    Family history of breast cancer in first degree relative (Sibling)  s/p mastectomy    FH: breast cancer (Sibling)              Vital Signs Last 24 Hrs  T(C): 36.9 (24 Sep 2021 15:58), Max: 38.2 (23 Sep 2021 19:52)  T(F): 98.4 (24 Sep 2021 15:58), Max: 100.7 (23 Sep 2021 19:52)  HR: 89 (24 Sep 2021 15:58) (72 - 91)  BP: 168/73 (24 Sep 2021 15:58) (94/60 - 168/73)  BP(mean): --  RR: 20 (24 Sep 2021 15:58) (18 - 20)  SpO2: 97% (24 Sep 2021 15:58) (96% - 100%)    PHYSICAL EXAM:      Constitutional:Appears weak, debilitated    Eyes: pale, aniceric      Neck:supple ,    Breasts: right breast retracted by tumor; left breast without mass or lesion        Respiratory:Clear     Cardiovascular:RRR normal S1S2    Gastrointestinal:Soft, non-tender, pos BS+        Extremities: UE edema      Neurological:awake, alert, non focal          LABS:                        9.9    5.38  )-----------( 69       ( 24 Sep 2021 07:25 )             29.4     09-    139  |  104  |  12.2  ----------------------------<  118<H>  4.0   |  25.0  |  0.52    Ca    9.3      24 Sep 2021 07:25  Mg     1.6     09-    TPro  6.3<L>  /  Alb  2.9<L>  /  TBili  2.8<H>  /  DBili  x   /  AST  159<H>  /  ALT  90<H>  /  AlkPhos  269<H>  -    PT/INR - ( 22 Sep 2021 20:48 )   PT: 13.6 sec;   INR: 1.18 ratio         PTT - ( 22 Sep 2021 20:48 )  PTT:35.7 sec  Urinalysis Basic - ( 22 Sep 2021 20:46 )    Color: Yellow / Appearance: Clear / S.015 / pH: x  Gluc: x / Ketone: Trace  / Bili: Negative / Urobili: 1 mg/dL   Blood: x / Protein: 15 mg/dL / Nitrite: Negative   Leuk Esterase: Small / RBC: 0-2 /HPF / WBC 0-2   Sq Epi: x / Non Sq Epi: Occasional / Bacteria: Occasional        RADIOLOGY & ADDITIONAL STUDIES:
HPI: Patient is a 75y Female seen on consultation for the evaluation and management of metastatic right sided  breast cancer. ER pos and Her-2 pos, initially  diagnosed in  with met disease to lung.  Has known met disease to liver, bone and brain, s/p RT.  There has been suspicion for possible leptomeningeal disease, but has to date been asymptomatic.  Has received multiple chemotherapeutic regimens, initially with good response, but with breakthrough disease, most recently intolerant of last regimen and has been given Xeloda and Xgeva.  Liquid molecular testing done did not show original Her-2 and regimen switch from Xeloda planned.  Pt usually ambulates with cane; has not described weakness prior.  Admitted after fall-slipped to floor and couldn't get up.  Also c/o diplopia and headaches.  Ha slow grade fevers.  Denies nausea, vomiting or abd pain.  appetite fair, has lost weight.   Still c/o diplopia.  LE weakness improved  Results of MRI reviewed:  diffuse supratentorial and infratentorial brain mets.  LP done; awaiting results    PAST MEDICAL & SURGICAL HISTORY:  Breast cancer  right breast    DVT of upper extremity (deep vein thrombosis)  RIGHT, as per pt. had years ago , was on rivaroxaban back then but has been stopped for long time.    Lymphedema    Essential hypertension    Heart murmur    Bacteremia    Risk factors for obstructive sleep apnea    PICC (peripherally inserted central catheter) in place  Left chest wall catheter Inserted     S/P biopsy  R breast    S/P thoracentesis  3/2016        REVIEW OF SYSTEMS      General:weakness, poor appetite, weight loss	    Skin/Breast:Breast retracted by tumor on right side  	  Ophthalmologic:double vision  	  ENMT:	denies sore throat    Respiratory and Thorax: mohan SOB  	  Cardiovascular:	no chest pain or palpitations    Gastrointestinal:	denies nausea or vomiting    Genitourinary:	denies dysuria or hematuria    Musculoskeletal:	no bone pain    Neurological:	weakness, diplopia    Allergic/Immunologic:	    MEDICATIONS  (STANDING):  capecitabine 650 milliGRAM(s) Oral two times a day  cholecalciferol 2000 Unit(s) Oral daily  cyanocobalamin 1000 MICROGram(s) Oral daily  dexAMETHasone     Tablet 4 milliGRAM(s) Oral every 6 hours  lactobacillus acidophilus 1 Tablet(s) Oral three times a day with meals  levETIRAcetam 500 milliGRAM(s) Oral two times a day  piperacillin/tazobactam IVPB.. 3.375 Gram(s) IV Intermittent every 8 hours  pyridoxine 100 milliGRAM(s) Oral daily    MEDICATIONS  (PRN):  acetaminophen   Tablet .. 650 milliGRAM(s) Oral every 8 hours PRN Mild Pain (1 - 3), Moderate Pain (4 - 6)  ondansetron    Tablet 4 milliGRAM(s) Oral every 8 hours PRN Nausea and/or Vomiting      Allergies    No Known Allergies    Intolerances    jacquelyn Ensure TID (Unknown)      SOCIAL HISTORY:    Smoking Status: denied  Alcohol:denied  Marital Status:   Occupation:not working    FAMILY HISTORY:  Family history of cancer of frontal sinus (Father)  followed by enucleation    Family history of lung cancer (Father)    Family history of breast cancer in first degree relative (Sibling)  s/p mastectomy    FH: breast cancer (Sibling)              Vital Signs Last 24 Hrs  T(C): 36.9 (24 Sep 2021 15:58), Max: 38.2 (23 Sep 2021 19:52)  T(F): 98.4 (24 Sep 2021 15:58), Max: 100.7 (23 Sep 2021 19:52)  HR: 89 (24 Sep 2021 15:58) (72 - 91)  BP: 168/73 (24 Sep 2021 15:58) (94/60 - 168/73)  BP(mean): --  RR: 20 (24 Sep 2021 15:58) (18 - 20)  SpO2: 97% (24 Sep 2021 15:58) (96% - 100%)    PHYSICAL EXAM:      Constitutional:Appears weak, debilitated    Eyes: pale, aniceric      Neck:supple ,    Breasts: right breast retracted by tumor; left breast without mass or lesion        Respiratory:Clear     Cardiovascular:RRR normal S1S2    Gastrointestinal:Soft, non-tender, pos BS+        Extremities: UE edema      Neurological:awake, alert, non focal          LABS:                        9.9    5.38  )-----------( 69       ( 24 Sep 2021 07:25 )             29.4     -    139  |  104  |  12.2  ----------------------------<  118<H>  4.0   |  25.0  |  0.52    Ca    9.3      24 Sep 2021 07:25  Mg     1.6     -    TPro  6.3<L>  /  Alb  2.9<L>  /  TBili  2.8<H>  /  DBili  x   /  AST  159<H>  /  ALT  90<H>  /  AlkPhos  269<H>      PT/INR - ( 22 Sep 2021 20:48 )   PT: 13.6 sec;   INR: 1.18 ratio         PTT - ( 22 Sep 2021 20:48 )  PTT:35.7 sec  Urinalysis Basic - ( 22 Sep 2021 20:46 )    Color: Yellow / Appearance: Clear / S.015 / pH: x  Gluc: x / Ketone: Trace  / Bili: Negative / Urobili: 1 mg/dL   Blood: x / Protein: 15 mg/dL / Nitrite: Negative   Leuk Esterase: Small / RBC: 0-2 /HPF / WBC 0-2   Sq Epi: x / Non Sq Epi: Occasional / Bacteria: Occasional        RADIOLOGY & ADDITIONAL STUDIES:
CC:  follow up [facility]    OVERNIGHT EVENTS:  none reported  nurse reports patient refusing Decadron    Present Symptoms:     Dyspnea: no  Nausea/Vomiting: No  Anxiety:   No  Depression: Yes   Fatigue: Yes  Loss of appetite: Yes  Constipation:     Pain: denies            Character-            Duration-            Effect-            Factors-            Frequency-            Location-            Severity-    Pain AD Score:  [email or web address] (press ctrl + left click to view)    Review of Systems: Reviewed                     All others negative    MEDICATIONS  (STANDING):  capecitabine 650 milliGRAM(s) Oral two times a day  cholecalciferol 2000 Unit(s) Oral daily  cyanocobalamin 1000 MICROGram(s) Oral daily  dexAMETHasone     Tablet 4 milliGRAM(s) Oral every 6 hours  lactobacillus acidophilus 1 Tablet(s) Oral three times a day with meals  levETIRAcetam 500 milliGRAM(s) Oral two times a day  pyridoxine 100 milliGRAM(s) Oral daily    MEDICATIONS  (PRN):  acetaminophen   Tablet .. 650 milliGRAM(s) Oral every 8 hours PRN Mild Pain (1 - 3), Moderate Pain (4 - 6)  ondansetron    Tablet 4 milliGRAM(s) Oral every 8 hours PRN Nausea and/or Vomiting      PHYSICAL EXAM:    Vital Signs Last 24 Hrs  T(C): 36.8 (27 Sep 2021 11:43), Max: 36.8 (26 Sep 2021 18:56)  T(F): 98.2 (27 Sep 2021 11:43), Max: 98.2 (26 Sep 2021 18:56)  HR: 70 (27 Sep 2021 11:43) (70 - 86)  BP: 137/86 (27 Sep 2021 11:43) (132/65 - 148/75)  BP(mean): --  RR: 20 (27 Sep 2021 11:43) (18 - 20)  SpO2: 97% (27 Sep 2021 11:43) (97% - 98%)    General: F AOx4 NAD  Karnofsky:  %    HEENT: normal    Lungs: comfortable  CV: normal   GI: normal    : normal    MSK: normal    Skin: normal   LABS:                          9.4    11.02 )-----------( 130      ( 26 Sep 2021 06:45 )             28.0     09-26    140  |  106  |  15.5  ----------------------------<  185<H>  4.0   |  24.0  |  0.78    Ca    9.6      26 Sep 2021 06:45          I&O's Summary      RADIOLOGY & ADDITIONAL STUDIES:  < from: CT Head No Cont (09.22.21 @ 23:39) >   EXAM:  CT BRAIN                          PROCEDURE DATE:  09/22/2021          INTERPRETATION:  CLINICAL INFORMATION: Weakness and dizziness. Metastatic right breast cancer.    TECHNIQUE: Multiple axial sections were acquired from the base of the skull to the vertex without contrast enhancement. Coronal and sagittal reformats were additionally performed. Beam hardening artifact slightly obscures evaluation of the posterior fossa and brainstem.    COMPARISON: MRI of the brain from 5/4/2021. PET/CT from 5/21/2021.    FINDINGS:  Interval development of scattered areas of vasogenic edema throughout the brain, some of which have associated high attenuation nodules including 2 areas in the right parietal region, suspicious for underlying metastatic disease.    Parenchymal volume loss is noted with prominent ventricles and sulci. Chronic microvascular ischemic changes are identified. No acute territorial infarct is demonstrated.    There is no evidence of an acute hemorrhage or mass-effect in the posterior fossa or in the supratentorial region.    Evaluation of the osseous structures with the appropriate window appears unremarkable. Vascular calcifications are noted. Sequela of bilateral lens surgery is seen. Mild mucosal thickening is seen in bilateral ethmoid and sphenoid sinuses. The remaining visualized paranasal sinuses and mastoid air cells are clear.    IMPRESSION:  Interval development of scattered areas of vasogenic edema throughout the brain suspicious for underlying metastatic disease. Consider a nonemergent follow-up contrast-enhanced MRI of the brain for further evaluation.    < end of copied text >      < from: CT Abdomen and Pelvis w/ IV Cont (09.23.21 @ 18:33) >    Multiple additional new lesions as compared with the prior CT of 08/20/2020. Probable new lesions as compared with the prior PET/CT of 05/21/2021.    BILE DUCTS: Normal caliber.  GALLBLADDER: Cholelithiasis.  SPLEEN: Within normal limits.  PANCREAS: Within normal limits.  ADRENALS: Within normal limits.  KIDNEYS/URETERS: Within normal limits.    BLADDER: within normal limits.  REPRODUCTIVE ORGANS: Uterus and adnexa within normal limits.    BOWEL: No bowel obstruction. Appendix is normal. Colonic diverticulosis.  PERITONEUM: Trace amount free fluid in the pelvis.  VESSELS: Extensive venous collaterals in the lower chest and anterior abdominal wall  RETROPERITONEUM/LYMPH NODES: No lymphadenopathy.  ABDOMINAL WALL: Extensive venous collaterals in the lower chest and anterior abdominal wall  BONES: Status post left hip replacement. Scattered sclerotic metastases, not significantly changed as compared with the prior PET/CT of 05/21/2021..    IMPRESSION:  Diffuse hepatic metastases which have increased in size compared with the prior CT of 08/20/2020 and likely increased in size and number as compared with the prior PET/CT of 05/21/2021 althoughcomparison is limited secondary to differences in technique. Consider repeat PET/ CT for more objective comparison.    There is no biliary dilatation.    Osseous metastases, not significantly changed.    Right pleural effusion not significantly changed.    < end of copied text >      ADVANCE DIRECTIVES/TREATMENT PREFERENCES:  DNR NO  Completed on:                     MOLST  NO   Completed on:  Living Will   NO   Completed on:
HPI: Patient is a 75y Female seen on consultation for the evaluation and management of metastatic right sided  breast cancer. ER pos and Her-2 pos, initially  diagnosed in  with met disease to lung.  Has known met disease to liver, bone and brain, s/p RT.  There has been suspicion for possible leptomeningeal disease, but has to date been asymptomatic.  Has received multiple chemotherapeutic regimens, initially with good response, but with breakthrough disease, most recently intolerant of last regimen and has been given Xeloda and Xgeva.  Liquid molecular testing done did not show original Her-2 and regimen switch from Xeloda planned.  Pt usually ambulates with cane; has not described weakness prior.  Admitted after fall-slipped to floor and couldn't get up.  Also c/o diplopia and headaches.  Ha slow grade fevers.  Denies nausea, vomiting or abd pain.  appetite fair, has lost weight.     MEDICATIONS  (STANDING):  capecitabine 650 milliGRAM(s) Oral two times a day  cholecalciferol 2000 Unit(s) Oral daily  cyanocobalamin 1000 MICROGram(s) Oral daily  dexAMETHasone     Tablet 4 milliGRAM(s) Oral every 6 hours  lactobacillus acidophilus 1 Tablet(s) Oral three times a day with meals  levETIRAcetam 500 milliGRAM(s) Oral two times a day  piperacillin/tazobactam IVPB.. 3.375 Gram(s) IV Intermittent every 8 hours  pyridoxine 100 milliGRAM(s) Oral daily    MEDICATIONS  (PRN):  acetaminophen   Tablet .. 650 milliGRAM(s) Oral every 8 hours PRN Mild Pain (1 - 3), Moderate Pain (4 - 6)  ondansetron    Tablet 4 milliGRAM(s) Oral every 8 hours PRN Nausea and/or Vomiting    Vital Signs Last 24 Hrs  T(C): 36.5 (25 Sep 2021 20:06), Max: 36.7 (25 Sep 2021 11:23)  T(F): 97.7 (25 Sep 2021 20:06), Max: 98.1 (25 Sep 2021 11:23)  HR: 95 (25 Sep 2021 20:06) (76 - 95)  BP: 116/60 (25 Sep 2021 20:06) (92/60 - 132/77)  BP(mean): --  RR: 20 (25 Sep 2021 20:06) (20 - 20)  SpO2: 96% (25 Sep 2021 20:06) (95% - 98%)  PHYSICAL EXAM:  Constitutional:Appears weak, debilitated  Eyes: pale, aniceric  Neck:supple ,  Breasts: right breast retracted by tumor; left breast without mass or lesion  Respiratory:Clear   Cardiovascular:RRR normal S1S2  Gastrointestinal:Soft, non-tender, pos BS+  Extremities: UE edema  Neurological:awake, alert, non focal      LABS:                          9.6    5.24  )-----------( 78       ( 25 Sep 2021 03:35 )             28.9                             9.9    5.38  )-----------( 69       ( 24 Sep 2021 07:25 )             29.4         139  |  105  |  15.1  ----------------------------<  195<H>  3.7   |  24.0  |  0.73    Ca    9.4      25 Sep 2021 03:35  Phos  1.9       Mg     1.8         TPro  6.3<L>  /  Alb  2.8<L>  /  TBili  1.8  /  DBili  x   /  AST  125<H>  /  ALT  83<H>  /  AlkPhos  281<H>      139  |  104  |  12.2  ----------------------------<  118<H>  4.0   |  25.0  |  0.52    Ca    9.3      24 Sep 2021 07:25  Mg     1.6         TPro  6.3<L>  /  Alb  2.9<L>  /  TBili  2.8<H>  /  DBili  x   /  AST  159<H>  /  ALT  90<H>  /  AlkPhos  269<H>      PT/INR - ( 22 Sep 2021 20:48 )   PT: 13.6 sec;   INR: 1.18 ratio         PTT - ( 22 Sep 2021 20:48 )  PTT:35.7 sec  Urinalysis Basic - ( 22 Sep 2021 20:46 )    Color: Yellow / Appearance: Clear / S.015 / pH: x  Gluc: x / Ketone: Trace  / Bili: Negative / Urobili: 1 mg/dL   Blood: x / Protein: 15 mg/dL / Nitrite: Negative   Leuk Esterase: Small / RBC: 0-2 /HPF / WBC 0-2   Sq Epi: x / Non Sq Epi: Occasional / Bacteria: Occasional        RADIOLOGY & ADDITIONAL STUDIES:
  Chief Complaint:  generalized weakness, sudden change in her vision     SUBJECTIVE / OVERNIGHT EVENTS: No acute events reported overnight.  Weakness is somewhat better, blurry vision in one eye more than other, recent b/l cataract surgery 2& 4weeks ago. started with unsteady gait along with blurry vision  - no new changes, has more energy, no fever      Patient denies chest pain, SOB, abd pain, N/V, HA/dizziness, fever, chills, dysuria or any other complaints. All remainder ROS negative.     Vital Signs Last 24 Hrs  T(C): 36.7 (26 Sep 2021 07:56), Max: 37 (26 Sep 2021 04:53)  T(F): 98 (26 Sep 2021 07:56), Max: 98.6 (26 Sep 2021 04:53)  HR: 74 (26 Sep 2021 07:56) (74 - 95)  BP: 145/77 (26 Sep 2021 07:56) (107/59 - 145/77)  BP(mean): --  RR: 18 (26 Sep 2021 07:56) (18 - 20)  SpO2: 96% (26 Sep 2021 07:56) (95% - 97%)        PHYSICAL EXAM:        GENERAL: pt examined bedside, laying comfortably in bed in NAD  HEENT: NC/AT, moist oral mucosa, clear conjunctiva, sclera nonicteric  CHEST: L-upper chest chemo port  RESPIRATORY: Normal respiratory effort; CTA b/l, no wheezing, rhonchi, rales  CARDIOVASCULAR: RRR, normal S1 and S2, no murmur/rub/gallop  ABDOMEN: soft, NT/ND, normoactive bowel sounds, no rebound/guarding  EXTREMITIES: No cynaosis, no clubbing, no lower extremity edema  PSYCH: affect appropriate and cooperative  NEUROLOGY: A+O to person, place, and time, no focal neurologic deficits appreciated   SKIN: No rashes or no palpable lesions        LABS:                                             9.4    11.02 )-----------( 130      ( 26 Sep 2021 06:45 )             28.0   09-26    140  |  106  |  15.5  ----------------------------<  185<H>  4.0   |  24.0  |  0.78    Ca    9.6      26 Sep 2021 06:45  Phos  1.9     09-25  Mg     1.8     09-25    TPro  6.3<L>  /  Alb  2.8<L>  /  TBili  1.8  /  DBili  x   /  AST  125<H>  /  ALT  83<H>  /  AlkPhos  281<H>  09-25              RADIOLOGY & ADDITIONAL TESTS:    < from: CT Head No Cont (09.22.21 @ 23:39) >  FINDINGS:  Interval development of scattered areas of vasogenic edema throughout the brain, some of which have associated high attenuation nodules including 2 areas in the right parietal region, suspicious for underlying metastatic disease.    Parenchymal volume loss is noted with prominent ventricles and sulci. Chronic microvascular ischemic changes are identified. No acute territorial infarct is demonstrated.    There is no evidence of an acute hemorrhage or mass-effect in the posterior fossa or in the supratentorial region.    Evaluation of the osseous structures with the appropriate window appears unremarkable. Vascular calcifications are noted. Sequela of bilateral lens surgery is seen. Mild mucosal thickening is seen in bilateral ethmoid and sphenoid sinuses. The remaining visualized paranasal sinuses and mastoid air cells are clear.    IMPRESSION:  Interval development of scattered areas of vasogenic edema throughout the brain suspicious for underlying metastatic disease. Consider a nonemergent follow-up contrast-enhanced MRI of the brain for further evaluation.    < end of copied text >                MEDICATIONS  (STANDING):  capecitabine 650 milliGRAM(s) Oral two times a day  cholecalciferol 2000 Unit(s) Oral daily  cyanocobalamin 1000 MICROGram(s) Oral daily  dexAMETHasone     Tablet 4 milliGRAM(s) Oral every 6 hours  lactobacillus acidophilus 1 Tablet(s) Oral three times a day with meals  levETIRAcetam 500 milliGRAM(s) Oral two times a day  pyridoxine 100 milliGRAM(s) Oral daily    MEDICATIONS  (PRN):  acetaminophen   Tablet .. 650 milliGRAM(s) Oral every 8 hours PRN Mild Pain (1 - 3), Moderate Pain (4 - 6)  ondansetron    Tablet 4 milliGRAM(s) Oral every 8 hours PRN Nausea and/or Vomiting                                    
CC: follow up GOC    OVERNIGHT EVENTS:  reports feeling better  Present Symptoms:     Dyspnea: no  Nausea/Vomiting: No  Anxiety:   No  Depression:  No  Fatigue:  No  Loss of appetite:  No  Constipation: not reported      Pain: denies            Character-            Duration-            Effect-            Factors-            Frequency-            Location-            Severity-    Pain AD Score:  [email or web address] (press ctrl + left click to view)    Review of Systems: Reviewed - no HA, dizziness, n.v                     All others negative    MEDICATIONS  (STANDING):  capecitabine 650 milliGRAM(s) Oral two times a day  cholecalciferol 2000 Unit(s) Oral daily  cyanocobalamin 1000 MICROGram(s) Oral daily  dexAMETHasone     Tablet 4 milliGRAM(s) Oral every 6 hours  lactobacillus acidophilus 1 Tablet(s) Oral three times a day with meals  levETIRAcetam 500 milliGRAM(s) Oral two times a day  pyridoxine 100 milliGRAM(s) Oral daily    MEDICATIONS  (PRN):  acetaminophen   Tablet .. 650 milliGRAM(s) Oral every 8 hours PRN Mild Pain (1 - 3), Moderate Pain (4 - 6)  ondansetron    Tablet 4 milliGRAM(s) Oral every 8 hours PRN Nausea and/or Vomiting      PHYSICAL EXAM:    Vital Signs Last 24 Hrs  T(C): 36.6 (28 Sep 2021 12:02), Max: 36.6 (28 Sep 2021 12:02)  T(F): 97.9 (28 Sep 2021 12:02), Max: 97.9 (28 Sep 2021 12:02)  HR: 77 (28 Sep 2021 12:02) (60 - 78)  BP: 122/64 (28 Sep 2021 12:02) (112/66 - 122/64)  BP(mean): --  RR: 19 (28 Sep 2021 12:02) (19 - 20)  SpO2: 99% (28 Sep 2021 12:02) (97% - 99%)    General: awake alert NAD  Karnofsky:  50  %    HEENT: normal      Lungs: comfortable   CV: normal   GI: normal               : normal    MSK: weakness bedbound/wheelchair bound    Skin: normal    LABS:      I&O's Summary      RADIOLOGY & ADDITIONAL STUDIES:  < from: MR Head w/wo IV Cont (09.27.21 @ 19:51) >     EXAM:  MR BRAIN WAW IC                          PROCEDURE DATE:  09/27/2021          INTERPRETATION:  CLINICAL INDICATIONS: ?hx blurred vision, history of metastatic breast cancer    COMPARISON: CT head dated 9/22/2021. MRI brain dated 5/4/2021. PET/CT dated 5/20/2021    TECHNIQUE: MRI brain: Multiplanar, multisequence MR imaging of the brain are obtained with and without the administration of 6 cc intravenous Gadavist contrast. 1.5 cc of contrast was discarded.    FINDINGS:    Numerous diffuse supratentorial tentorial enhancing nodules compatible with metastatic disease. The largest nodule in the right frontal lobe measures 1.1 cm. The largest nodule in the left frontal lobe measures 1.1 cm. The largest nodule in the right parietal lobe measures 8.3 mm. The largest nodule in the right occipital lobe measures 8.6 mm. The largest nodule in the right temporal lobe measures 1.5 cm. Posterior right pontine nodule measures 1.6 cm the largest nodule in the left temporal lobe measures 7 mm. Largest nodule left occipital lobe measures 3.5 mm. The largest nodule in the right cerebellar hemisphere measures 1.5 cm. Moderate to severe edema throughout the bilateral frontal lobes, right parietal lobe, right temporal lobe, brainstem, right cerebellar hemisphere bilateral occipital lobes.    Possible skull base metastasis involving the left sphenoid bone on image 9 of series 5.    There is no abnormal restricted diffusion to suggest acute infarction. . Normal T2 flow-voids are seen within  the intracranial vasculature. The lateral ventricles and cortical sulci are age-appropriate in size and configuration.  There is no susceptibility artifact to suggest hemorrhage. Midline structures are normal.  The patient is status post bilateral ocular lens replacement surgery. The visualized paranasal sinuses, mastoid air cells and orbits are otherwise unremarkable.    Abnormal scattered low T1 signal in the cervical spine suspicious for osseous metastasis. Further evaluation is recommended.    IMPRESSION: Diffuse supratentorial and infratentorial metastatic disease with associated areas of vasogenic edema. Osseous metastasis.      < end of copied text >      ADVANCE DIRECTIVES/TREATMENT PREFERENCES:  DNR NO  Completed on:                     MOLST   NO   Completed on:  Living Will   NO   Completed on:
HPI: Patient is a 75y Female seen on consultation for the evaluation and management of metastatic right sided  breast cancer. ER pos and Her-2 pos, initially  diagnosed in  with met disease to lung.  Has known met disease to liver, bone and brain, s/p RT.  There has been suspicion for possible leptomeningeal disease, but has to date been asymptomatic.  Has received multiple chemotherapeutic regimens, initially with good response, but with breakthrough disease, most recently intolerant of last regimen and has been given Xeloda and Xgeva.  Liquid molecular testing done did not show original Her-2 and regimen switch from Xeloda planned.  Pt usually ambulates with cane; has not described weakness prior.  Admitted after fall-slipped to floor and couldn't get up.  Also c/o diplopia and headaches.  Ha slow grade fevers.  Denies nausea, vomiting or abd pain.  appetite fair, has lost weight.   Still has diplopia; denies headaches or dizziness.  Denies fevers or chills  Better able to ambulate    PAST MEDICAL & SURGICAL HISTORY:  Breast cancer  right breast    DVT of upper extremity (deep vein thrombosis)  RIGHT, as per pt. had years ago , was on rivaroxaban back then but has been stopped for long time.    Lymphedema    Essential hypertension    Heart murmur    Bacteremia    Risk factors for obstructive sleep apnea    PICC (peripherally inserted central catheter) in place  Left chest wall catheter Inserted     S/P biopsy  R breast    S/P thoracentesis  3/2016        REVIEW OF SYSTEMS      General:weakness, poor appetite, weight loss	    Skin/Breast:Breast retracted by tumor on right side  	  Ophthalmologic:double vision  	  ENMT:	denies sore throat    Respiratory and Thorax: mohan SOB  	  Cardiovascular:	no chest pain or palpitations    Gastrointestinal:	denies nausea or vomiting    Genitourinary:	denies dysuria or hematuria    Musculoskeletal:	no bone pain    Neurological:	weakness, diplopia    Allergic/Immunologic:	    MEDICATIONS  (STANDING):  capecitabine 650 milliGRAM(s) Oral two times a day  cholecalciferol 2000 Unit(s) Oral daily  cyanocobalamin 1000 MICROGram(s) Oral daily  dexAMETHasone     Tablet 4 milliGRAM(s) Oral every 6 hours  lactobacillus acidophilus 1 Tablet(s) Oral three times a day with meals  levETIRAcetam 500 milliGRAM(s) Oral two times a day  piperacillin/tazobactam IVPB.. 3.375 Gram(s) IV Intermittent every 8 hours  pyridoxine 100 milliGRAM(s) Oral daily    MEDICATIONS  (PRN):  acetaminophen   Tablet .. 650 milliGRAM(s) Oral every 8 hours PRN Mild Pain (1 - 3), Moderate Pain (4 - 6)  ondansetron    Tablet 4 milliGRAM(s) Oral every 8 hours PRN Nausea and/or Vomiting      Allergies    No Known Allergies    Intolerances    jacquelyn Ensure TID (Unknown)      SOCIAL HISTORY:    Smoking Status: denied  Alcohol:denied  Marital Status:   Occupation:not working    FAMILY HISTORY:  Family history of cancer of frontal sinus (Father)  followed by enucleation    Family history of lung cancer (Father)    Family history of breast cancer in first degree relative (Sibling)  s/p mastectomy    FH: breast cancer (Sibling)              Vital Signs Last 24 Hrs  T(C): 36.9 (24 Sep 2021 15:58), Max: 38.2 (23 Sep 2021 19:52)  T(F): 98.4 (24 Sep 2021 15:58), Max: 100.7 (23 Sep 2021 19:52)  HR: 89 (24 Sep 2021 15:58) (72 - 91)  BP: 168/73 (24 Sep 2021 15:58) (94/60 - 168/73)  BP(mean): --  RR: 20 (24 Sep 2021 15:58) (18 - 20)  SpO2: 97% (24 Sep 2021 15:58) (96% - 100%)    PHYSICAL EXAM:      Constitutional:Appears weak, debilitated    Eyes: pale, aniceric      Neck:supple ,    Breasts: right breast retracted by tumor; left breast without mass or lesion        Respiratory:Clear     Cardiovascular:RRR normal S1S2    Gastrointestinal:Soft, non-tender, pos BS+        Extremities: UE edema      Neurological:awake, alert, non focal          LABS:                        9.9    5.38  )-----------( 69       ( 24 Sep 2021 07:25 )             29.4     09-24    139  |  104  |  12.2  ----------------------------<  118<H>  4.0   |  25.0  |  0.52    Ca    9.3      24 Sep 2021 07:25  Mg     1.6     09-    TPro  6.3<L>  /  Alb  2.9<L>  /  TBili  2.8<H>  /  DBili  x   /  AST  159<H>  /  ALT  90<H>  /  AlkPhos  269<H>  09-24    PT/INR - ( 22 Sep 2021 20:48 )   PT: 13.6 sec;   INR: 1.18 ratio         PTT - ( 22 Sep 2021 20:48 )  PTT:35.7 sec  Urinalysis Basic - ( 22 Sep 2021 20:46 )    Color: Yellow / Appearance: Clear / S.015 / pH: x  Gluc: x / Ketone: Trace  / Bili: Negative / Urobili: 1 mg/dL   Blood: x / Protein: 15 mg/dL / Nitrite: Negative   Leuk Esterase: Small / RBC: 0-2 /HPF / WBC 0-2   Sq Epi: x / Non Sq Epi: Occasional / Bacteria: Occasional        RADIOLOGY & ADDITIONAL STUDIES:
pre-op note  LP for cytology  mets to brain - breast CA  d/w Dr. Fidel Leon.  
  Chief Complaint:  generalize weakness    SUBJECTIVE / OVERNIGHT EVENTS: No acute events reported overnight.  Pt notes today that she has been having double vision recently and more unsteady gait.  Patient denies chest pain, SOB, abd pain, N/V, HA/dizziness, fever, chills, dysuria or any other complaints. All remainder ROS negative.       I&O's Summary        PHYSICAL EXAM:  Vital Signs Last 24 Hrs  T(C): 36.7 (24 Sep 2021 11:17), Max: 38.3 (23 Sep 2021 14:18)  T(F): 98.1 (24 Sep 2021 11:17), Max: 100.9 (23 Sep 2021 14:18)  HR: 91 (24 Sep 2021 11:17) (72 - 91)  BP: 115/68 (24 Sep 2021 11:17) (94/60 - 137/83)  BP(mean): --  RR: 20 (24 Sep 2021 11:17) (18 - 20)  SpO2: 100% (24 Sep 2021 11:17) (96% - 100%)      GENERAL: pt examined bedside, laying comfortably in bed in NAD  HEENT: NC/AT, moist oral mucosa, clear conjunctiva, sclera nonicteric  CHEST: L-upper chest chemo port  RESPIRATORY: Normal respiratory effort; CTA b/l, no wheezing, rhonchi, rales  CARDIOVASCULAR: RRR, normal S1 and S2, no murmur/rub/gallop  ABDOMEN: soft, NT/ND, normoactive bowel sounds, no rebound/guarding  EXTREMITIES: No cynaosis, no clubbing, no lower extremity edema; Peripheral pulses are 2+ bilaterally  PSYCH: affect appropriate and cooperative  NEUROLOGY: A+O to person, place, and time, no focal neurologic deficits appreciated   SKIN: No rashes or no palpable lesions        LABS:                        9.9    5.38  )-----------( 69       ( 24 Sep 2021 07:25 )             29.4     09-24    139  |  104  |  12.2  ----------------------------<  118<H>  4.0   |  25.0  |  0.52    Ca    9.3      24 Sep 2021 07:25  Mg     1.6     09-    TPro  6.3<L>  /  Alb  2.9<L>  /  TBili  2.8<H>  /  DBili  x   /  AST  159<H>  /  ALT  90<H>  /  AlkPhos  269<H>      PT/INR - ( 22 Sep 2021 20:48 )   PT: 13.6 sec;   INR: 1.18 ratio         PTT - ( 22 Sep 2021 20:48 )  PTT:35.7 sec      Urinalysis Basic - ( 22 Sep 2021 20:46 )    Color: Yellow / Appearance: Clear / S.015 / pH: x  Gluc: x / Ketone: Trace  / Bili: Negative / Urobili: 1 mg/dL   Blood: x / Protein: 15 mg/dL / Nitrite: Negative   Leuk Esterase: Small / RBC: 0-2 /HPF / WBC 0-2   Sq Epi: x / Non Sq Epi: Occasional / Bacteria: Occasional        CAPILLARY BLOOD GLUCOSE            RADIOLOGY & ADDITIONAL TESTS:    < from: CT Head No Cont (21 @ 23:39) >  FINDINGS:  Interval development of scattered areas of vasogenic edema throughout the brain, some of which have associated high attenuation nodules including 2 areas in the right parietal region, suspicious for underlying metastatic disease.    Parenchymal volume loss is noted with prominent ventricles and sulci. Chronic microvascular ischemic changes are identified. No acute territorial infarct is demonstrated.    There is no evidence of an acute hemorrhage or mass-effect in the posterior fossa or in the supratentorial region.    Evaluation of the osseous structures with the appropriate window appears unremarkable. Vascular calcifications are noted. Sequela of bilateral lens surgery is seen. Mild mucosal thickening is seen in bilateral ethmoid and sphenoid sinuses. The remaining visualized paranasal sinuses and mastoid air cells are clear.    IMPRESSION:  Interval development of scattered areas of vasogenic edema throughout the brain suspicious for underlying metastatic disease. Consider a nonemergent follow-up contrast-enhanced MRI of the brain for further evaluation.    < end of copied text >                MEDICATIONS  (STANDING):  capecitabine 650 milliGRAM(s) Oral two times a day  cholecalciferol 2000 Unit(s) Oral daily  cyanocobalamin 1000 MICROGram(s) Oral daily  heparin   Injectable 5000 Unit(s) SubCutaneous every 12 hours  lactobacillus acidophilus 1 Tablet(s) Oral three times a day with meals  levETIRAcetam 500 milliGRAM(s) Oral two times a day  piperacillin/tazobactam IVPB.. 3.375 Gram(s) IV Intermittent every 8 hours  pyridoxine 100 milliGRAM(s) Oral daily    MEDICATIONS  (PRN):  acetaminophen   Tablet .. 650 milliGRAM(s) Oral every 8 hours PRN Mild Pain (1 - 3), Moderate Pain (4 - 6)  ondansetron    Tablet 4 milliGRAM(s) Oral every 8 hours PRN Nausea and/or Vomiting                                  
  Chief Complaint:  generalized weakness    SUBJECTIVE / OVERNIGHT EVENTS: No acute events reported overnight.  Weakness is somewhat better, blurry vision in one eye more than other, recent b/l cataract surgery 2& 4weeks ago. started with unsteady gait along with blurry vision     Patient denies chest pain, SOB, abd pain, N/V, HA/dizziness, fever, chills, dysuria or any other complaints. All remainder ROS negative.             PHYSICAL EXAM:    Vital Signs Last 24 Hrs  T(C): 36.7 (25 Sep 2021 11:23), Max: 36.9 (24 Sep 2021 15:58)  T(F): 98.1 (25 Sep 2021 11:23), Max: 98.4 (24 Sep 2021 15:58)  HR: 83 (25 Sep 2021 11:23) (75 - 89)  BP: 92/60 (25 Sep 2021 11:23) (92/60 - 168/73)  BP(mean): --  RR: 20 (25 Sep 2021 11:23) (20 - 20)  SpO2: 95% (25 Sep 2021 11:23) (95% - 98%)      GENERAL: pt examined bedside, laying comfortably in bed in NAD  HEENT: NC/AT, moist oral mucosa, clear conjunctiva, sclera nonicteric  CHEST: L-upper chest chemo port  RESPIRATORY: Normal respiratory effort; CTA b/l, no wheezing, rhonchi, rales  CARDIOVASCULAR: RRR, normal S1 and S2, no murmur/rub/gallop  ABDOMEN: soft, NT/ND, normoactive bowel sounds, no rebound/guarding  EXTREMITIES: No cynaosis, no clubbing, no lower extremity edema  PSYCH: affect appropriate and cooperative  NEUROLOGY: A+O to person, place, and time, no focal neurologic deficits appreciated   SKIN: No rashes or no palpable lesions        LABS:                                             9.6    5.24  )-----------( 78       ( 25 Sep 2021 03:35 )             28.9   09-25    139  |  105  |  15.1  ----------------------------<  195<H>  3.7   |  24.0  |  0.73    Ca    9.4      25 Sep 2021 03:35  Phos  1.9     09-25  Mg     1.8     09-25    TPro  6.3<L>  /  Alb  2.8<L>  /  TBili  1.8  /  DBili  x   /  AST  125<H>  /  ALT  83<H>  /  AlkPhos  281<H>  09-25              RADIOLOGY & ADDITIONAL TESTS:    < from: CT Head No Cont (09.22.21 @ 23:39) >  FINDINGS:  Interval development of scattered areas of vasogenic edema throughout the brain, some of which have associated high attenuation nodules including 2 areas in the right parietal region, suspicious for underlying metastatic disease.    Parenchymal volume loss is noted with prominent ventricles and sulci. Chronic microvascular ischemic changes are identified. No acute territorial infarct is demonstrated.    There is no evidence of an acute hemorrhage or mass-effect in the posterior fossa or in the supratentorial region.    Evaluation of the osseous structures with the appropriate window appears unremarkable. Vascular calcifications are noted. Sequela of bilateral lens surgery is seen. Mild mucosal thickening is seen in bilateral ethmoid and sphenoid sinuses. The remaining visualized paranasal sinuses and mastoid air cells are clear.    IMPRESSION:  Interval development of scattered areas of vasogenic edema throughout the brain suspicious for underlying metastatic disease. Consider a nonemergent follow-up contrast-enhanced MRI of the brain for further evaluation.    < end of copied text >                MEDICATIONS  (STANDING):  capecitabine 650 milliGRAM(s) Oral two times a day  cholecalciferol 2000 Unit(s) Oral daily  cyanocobalamin 1000 MICROGram(s) Oral daily  heparin   Injectable 5000 Unit(s) SubCutaneous every 12 hours  lactobacillus acidophilus 1 Tablet(s) Oral three times a day with meals  levETIRAcetam 500 milliGRAM(s) Oral two times a day  piperacillin/tazobactam IVPB.. 3.375 Gram(s) IV Intermittent every 8 hours  pyridoxine 100 milliGRAM(s) Oral daily    MEDICATIONS  (PRN):  acetaminophen   Tablet .. 650 milliGRAM(s) Oral every 8 hours PRN Mild Pain (1 - 3), Moderate Pain (4 - 6)  ondansetron    Tablet 4 milliGRAM(s) Oral every 8 hours PRN Nausea and/or Vomiting                                  
  Chief Complaint:  generalized weakness, sudden change in her vision     SUBJECTIVE / OVERNIGHT EVENTS: No acute events reported overnight.  Weakness is somewhat better, blurry vision in one eye more than other, recent b/l cataract surgery 2& 4weeks ago. Started with unsteady gait along with blurry vision  - no new changes, has more energy, no fever      Patient denies chest pain, SOB, abd pain, N/V, HA/dizziness, fever, chills, dysuria or any other complaints. All remainder ROS negative.       Vital Signs Last 24 Hrs  T(C): 36.8 (27 Sep 2021 11:43), Max: 36.8 (26 Sep 2021 18:56)  T(F): 98.2 (27 Sep 2021 11:43), Max: 98.2 (26 Sep 2021 18:56)  HR: 70 (27 Sep 2021 11:43) (70 - 86)  BP: 137/86 (27 Sep 2021 11:43) (132/65 - 148/75)  BP(mean): --  RR: 20 (27 Sep 2021 11:43) (18 - 20)  SpO2: 97% (27 Sep 2021 11:43) (97% - 98%)      PHYSICAL EXAM:        GENERAL: laying comfortably in bed in NAD  HEENT: NC/AT, moist oral mucosa, clear conjunctiva, sclera nonicteric  CHEST: L-upper chest chemo port  RESPIRATORY: Normal respiratory effort; CTA b/l, no wheezing  CARDIOVASCULAR: RRR, normal S1 and S2, no murmur/rub/gallop  EXTREMITIES: No cynaosis, no clubbing, no lower extremity edema  PSYCH: affect appropriate and cooperative          LABS:                                             9.4    11.02 )-----------( 130      ( 26 Sep 2021 06:45 )             28.0   09-26    140  |  106  |  15.5  ----------------------------<  185<H>  4.0   |  24.0  |  0.78    Ca    9.6      26 Sep 2021 06:45                  RADIOLOGY & ADDITIONAL TESTS:    < from: CT Head No Cont (09.22.21 @ 23:39) >  FINDINGS:  Interval development of scattered areas of vasogenic edema throughout the brain, some of which have associated high attenuation nodules including 2 areas in the right parietal region, suspicious for underlying metastatic disease.    Parenchymal volume loss is noted with prominent ventricles and sulci. Chronic microvascular ischemic changes are identified. No acute territorial infarct is demonstrated.    There is no evidence of an acute hemorrhage or mass-effect in the posterior fossa or in the supratentorial region.    Evaluation of the osseous structures with the appropriate window appears unremarkable. Vascular calcifications are noted. Sequela of bilateral lens surgery is seen. Mild mucosal thickening is seen in bilateral ethmoid and sphenoid sinuses. The remaining visualized paranasal sinuses and mastoid air cells are clear.    IMPRESSION:  Interval development of scattered areas of vasogenic edema throughout the brain suspicious for underlying metastatic disease. Consider a nonemergent follow-up contrast-enhanced MRI of the brain for further evaluation.    < end of copied text >              MEDICATIONS  (STANDING):  capecitabine 650 milliGRAM(s) Oral two times a day  cholecalciferol 2000 Unit(s) Oral daily  cyanocobalamin 1000 MICROGram(s) Oral daily  dexAMETHasone     Tablet 4 milliGRAM(s) Oral every 6 hours  lactobacillus acidophilus 1 Tablet(s) Oral three times a day with meals  levETIRAcetam 500 milliGRAM(s) Oral two times a day  pyridoxine 100 milliGRAM(s) Oral daily    MEDICATIONS  (PRN):  acetaminophen   Tablet .. 650 milliGRAM(s) Oral every 8 hours PRN Mild Pain (1 - 3), Moderate Pain (4 - 6)  ondansetron    Tablet 4 milliGRAM(s) Oral every 8 hours PRN Nausea and/or Vomiting                                    
  Chief Complaint:  generalized weakness, sudden change in her vision   MRI done , awaiting LP   SUBJECTIVE / OVERNIGHT EVENTS: No acute events reported overnight.  Weakness is somewhat better, blurry vision in one eye more than other, recent b/l cataract surgery 2& 4weeks ago. Started with unsteady gait along with blurry vision  - no new changes, has more energy, no fever      Patient denies chest pain, SOB, abd pain, N/V, HA/dizziness, fever, chills, dysuria or any other complaints. All remainder ROS negative.       Vital Signs Last 24 Hrs  T(C): 36.6 (28 Sep 2021 12:02), Max: 36.6 (28 Sep 2021 12:02)  T(F): 97.9 (28 Sep 2021 12:02), Max: 97.9 (28 Sep 2021 12:02)  HR: 77 (28 Sep 2021 12:02) (60 - 78)  BP: 122/64 (28 Sep 2021 12:02) (112/66 - 122/64)  BP(mean): --  RR: 19 (28 Sep 2021 12:02) (19 - 20)  SpO2: 99% (28 Sep 2021 12:02) (97% - 99%)      PHYSICAL EXAM:        GENERAL: laying comfortably in bed in NAD  HEENT: NC/AT, moist oral mucosa, clear conjunctiva, sclera nonicteric  CHEST: L-upper chest chemo port  RESPIRATORY: Normal respiratory effort; CTA b/l, no wheezing  CARDIOVASCULAR: RRR, normal S1 and S2, no murmur/rub/gallop  EXTREMITIES: No cynaosis, no clubbing, no lower extremity edema  PSYCH: affect appropriate and cooperative          LABS:                                        RADIOLOGY & ADDITIONAL TESTS:    < from: CT Head No Cont (09.22.21 @ 23:39) >  FINDINGS:  Interval development of scattered areas of vasogenic edema throughout the brain, some of which have associated high attenuation nodules including 2 areas in the right parietal region, suspicious for underlying metastatic disease.    Parenchymal volume loss is noted with prominent ventricles and sulci. Chronic microvascular ischemic changes are identified. No acute territorial infarct is demonstrated.    There is no evidence of an acute hemorrhage or mass-effect in the posterior fossa or in the supratentorial region.    Evaluation of the osseous structures with the appropriate window appears unremarkable. Vascular calcifications are noted. Sequela of bilateral lens surgery is seen. Mild mucosal thickening is seen in bilateral ethmoid and sphenoid sinuses. The remaining visualized paranasal sinuses and mastoid air cells are clear.    IMPRESSION:  Interval development of scattered areas of vasogenic edema throughout the brain suspicious for underlying metastatic disease. Consider a nonemergent follow-up contrast-enhanced MRI of the brain for further evaluation.    < end of copied text >            MEDICATIONS  (STANDING):  capecitabine 650 milliGRAM(s) Oral two times a day  cholecalciferol 2000 Unit(s) Oral daily  cyanocobalamin 1000 MICROGram(s) Oral daily  dexAMETHasone     Tablet 4 milliGRAM(s) Oral every 6 hours  lactobacillus acidophilus 1 Tablet(s) Oral three times a day with meals  levETIRAcetam 500 milliGRAM(s) Oral two times a day  pyridoxine 100 milliGRAM(s) Oral daily    MEDICATIONS  (PRN):  acetaminophen   Tablet .. 650 milliGRAM(s) Oral every 8 hours PRN Mild Pain (1 - 3), Moderate Pain (4 - 6)  ondansetron    Tablet 4 milliGRAM(s) Oral every 8 hours PRN Nausea and/or Vomiting

## 2021-09-29 NOTE — DISCHARGE NOTE NURSING/CASE MANAGEMENT/SOCIAL WORK - PATIENT PORTAL LINK FT
You can access the FollowMyHealth Patient Portal offered by [facility] by registering at the following website: [email or web address]. By joining [employer]’s FollowMyHealth portal, you will also be able to view your health information using other applications (apps) compatible with our system.

## 2021-09-29 NOTE — PROGRESS NOTE ADULT - REASON FOR ADMISSION
generalized weakness

## 2021-09-29 NOTE — DISCHARGE NOTE PROVIDER - DETAILS OF MALNUTRITION DIAGNOSIS/DIAGNOSES
This patient has been assessed with a concern for Malnutrition and was treated during this hospitalization for the following Nutrition diagnosis/diagnoses:     -  09/25/2021: Moderate protein-calorie malnutrition

## 2021-11-13 NOTE — H&P ADULT - PSH
PICC (peripherally inserted central catheter) in place    S/P biopsy  R breast  S/P thoracentesis  3/2016
None

## 2022-01-01 NOTE — PHYSICAL THERAPY INITIAL EVALUATION ADULT - TRANSFER SAFETY CONCERNS NOTED: SIT/STAND, REHAB EVAL
decreased weight-shifting ability CCHD Screen [10-10]: Initial  Pre-Ductal SpO2(%): 98  Post-Ductal SpO2(%): 98  SpO2 Difference(Pre MINUS Post): 0  Extremities Used: Right Hand,Right Foot  Result: Passed  Follow up: Normal Screen- (No follow-up needed)

## 2022-02-08 ENCOUNTER — APPOINTMENT (OUTPATIENT)
Dept: CARDIOLOGY | Facility: CLINIC | Age: 77
End: 2022-02-08

## 2022-02-23 ENCOUNTER — APPOINTMENT (OUTPATIENT)
Dept: CARDIOLOGY | Facility: CLINIC | Age: 77
End: 2022-02-23

## 2022-06-15 NOTE — PHYSICAL THERAPY INITIAL EVALUATION ADULT - GENERAL OBSERVATIONS, REHAB EVAL
[de-identified] : 
Pt rec'd in room supine in bed breathing RA in NAD, abd pillow in place.

## 2022-07-11 NOTE — PROGRESS NOTE ADULT - PROBLEM SELECTOR PROBLEM 4
SUBJECTIVE:   China Guzman is a 89 year old female who complains of high bp today  for 180/100 at home but she takes her BP meds at night and has not taken them yet today . She denies a history of shortness of breath, weakness, fatigue, dizzyness, chest pain and headache      OBJECTIVE:    BP (!) 188/94 (BP Location: Left arm, Patient Position: Sitting, Cuff Size: Adult Regular)   Pulse 102   Temp 98  F (36.7  C) (Oral)   Resp 18   Wt 47.6 kg (105 lb)   LMP 12/16/1980   SpO2 98%   Breastfeeding No   BMI 21.21 kg/m      She appears well, vital signs are as noted by the nurse. Ears normal.  Throat and pharynx normal.  Neck supple. No adenopathy in the neck.. The chest is clear, without wheezes or rales. CVS exam: S1, S2 normal, no murmur, click, rub or gallop, regular rate and rhythm.     ASSESSMENT:      Diagnosis Comments   1. Primary hypertension           PLAN:  Her bp came down some with manual cuff  She is seeing pcp nurse for BP check tomorrow  Take bp meds when you get home   Immunosuppressed status

## 2022-09-04 NOTE — DISCHARGE NOTE PROVIDER - REASON FOR ADMISSION
69 year old woman with HFpEF with primarily RV dysfunction s/p CardioMEMS, severe pulmonary hypertension (Group 1, 2 & 3), COPD on home O2 8L, AF (on Eliquis), PE, CVA (MCA infarct 2012), CKD (b/l Cr 1.7-1.8), ROLANDA on CPAP and morbid obesity (s/p bariatric surgery 2012 with lap band removal d/t GIB) who was recently hospitalized 6/14-6/29/22 for volume overload where she met with lung transplant team with plan for evaluation pending improvement in BMI and deconditioned state. Had RHC in June with severely elevated PAP and preserved CO.     She presents with RINALDI, ABD bloating and LE swelling in the setting of gradual 25 lb weight gain despite escalation of diuretic regimen. She was started on intermittent IV Lasix and was responding well, but remains markedly overloaded with predominate symptoms of elevated R sided filling pressure. She was switched to IV bumex and did not respond as well so now back on IV lasix with as needed hypertonic saline. She is likely not a lung transplant candidate and also not a candidate for IV therapy pulmonary dilators. Plan is for continued diuresis and palliative care was consulted but signed off, pt remains full code at this time.     Cardiac Studies  CardioMEMS interrogation 8/16: PAP 98/45/66, HR 87  RHC and CardioMEMS Implant 6/20/22: RA 18, /44/65, PCWP 16 (v to 18), RA 95% on 8L NC, PA 58%, CO/CI (F) 6.7/3.2, PVR 7.32 ryan, TPG 49, /60/86 (CardioMEMS PAD 49)  TTE 6/15/22: LVIDd 4.8 cm, LVEF 68%, flattening of interventricular septum consistent with RV overload, mild concentric LVH (septum 1.2 PWT 1.1), RVE with decreased systolic function, mild LAE, severe ROE, mod TR, est RVSP 61 mmHg generalized weakness

## 2022-11-01 NOTE — ED ADULT NURSE NOTE - NSFALLRSKASSESSTYPE_ED_ALL_ED
ATTENTION DEFICIT DISORDER EVALUATION    Nursing notes reviewed and accepted.    ASSESSMENT/PLAN:  Chaitanya was seen today for follow-up.    Diagnoses and all orders for this visit:    Encounter for medication management in attention deficit hyperactivity disorder (ADHD)    -     dexmethylphenidate (Focalin XR) 10 MG 24 hr capsule; Take 1 capsule by mouth daily.      -      Follow up in 3-4  month(s), sooner if questions or concerns.      CHIEF COMPLAINT:    Chief Complaint   Patient presents with   • Follow-up     HISTORY OF PRESENT ILLNESS:  This is a 18 year old male who presents for an ADD (attention deficit disorder)/ADHD (attention deficit hyperactivity disorder) follow up.  He is currently taking Focalin XR  (dexmethylphenidate) 10 mg daily.The medication is working well.  He is taking the medication at 645am.  It seems to last through out the day.  He reports he is doing home work well.  He is a [employer].  He believes all of his grades will be good except psych and environmental science which are passing (Ds).  Football is just wrapping up.         Sleep is still an issue.  He is going to bed at 1130-12am.  The last week more tired so going to bed at 10pm.          Side effects:  Appetite has been unchanged.  Chaitanya Ruiz has been sleeping . Patient has been tired  Abdominal pain:  NO.  Headaches:  NO.  Chest pain or palpitations:  NO.    REVIEW OF SYSTEMS:  See HPI (History of Present Illness); otherwise denies RESPIRATORY, CARDIAC, GASTROINTESTINAL, NEUROLOGIC or PSYCHIATRIC Symptoms.    ALLERGIES:  No Known Allergies    PHYSICAL EXAM:  Blood pressure 114/70, pulse 69, temperature 97.4 °F (36.3 °C), temperature source Temporal, height 5' 11.26\" (1.81 m), weight 80.4 kg (177 lb 5.8 oz).  GENERAL:  Well appearing male, nontoxic, no acute distress.  Alert and interactive.  HEART:  Regular rate and rhythm.  Quiet precordium.  Normal S1, S2.  No murmurs, rubs, gallops.   LUNGS:  Clear to auscultation  bilaterally.  No wheezes, rales, rhonchi.  Normal work of breathing.              Elva Santoro MA  11/1/2022  9:30 PM     Initial (On Arrival)

## 2022-11-20 NOTE — HISTORY OF PRESENT ILLNESS
[FreeTextEntry1] : Infection Follow Up [de-identified] : PT HERE FOR FOLLOW UP FEELS OK NO MAJOR COMPLAINT HAS BEEN OFF ABX AND STILL ON DIFLUCAN FOR CANDIDA OSTEOMYELITIS OVERALL EELS WELL ON CHEMO NOW IS WEIGHT BEARING AND  FOLLOWING UP  WITH DR NUNES\par NO FEVERS NO CHILLS  rolling walker

## 2023-02-14 NOTE — PLAN
40F PMHx asthma, PCOS, SLE with dilated non-ischemic CM, s/p cardiac transplant (May 2018), on tacrolimus, PE (Jan 2021) on Eliquis (has IVC filter), gastric sleeve in 2011, kidney stones, parathyroidectomy, p/w ABD pain and GIB iso of recent AC use. Patient planned for endoscopy tomorrow & endocrine consulted for assistance with perioperative steroid management iso of recently dx Adrenal insufficiency, suspected to be secondary AI iso chronic opiate use.     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  - If patient becomes hemodynamically unstable, increase to stress dose steroids HC 50mg IV q8h  DC Planning:   -likely back on home regimen HC 10mg PO q8am and 10mg PO q3PM   -Discussed with patient sick day rules. For discharge: please print and give the pt info section for patients under adrenal insufficiency (this will educate her regarding the warning signs of adrenal crisis).  patient that she should take 2-3x her home dose in cases of illness, fever, accidents, and surgery. Pt should receive stress dosing (hydrocortisone 50mg q8) with any major illness or surgical procedure. Should pt be unable to tolerate PO and is unable to take hydrocortisone, she will need an emergency injection. Please discharge with a prescription for 100mg Solu-Cortef Act-O-Vial and the following instructions: [email or web address]  -Pt should obtain a medical alert bracelet or necklace to inform emergency providers that she has adrenal insufficiency. [email or web address]    #Hx of Hyperparathyroidism hx s/p parathyroidectomy  Corrected Calcium level normal.  med rec shows she is on calcium 500mg bid - patient reports she is currently not on any calcium or vitamin d   replete Mg if low    #Hx of hyperthyroidism  Followed with Endocrine at [place]. Sounds like fellows clinic based on description. States she was previously on amiodarone and required methimazole temporarily for hyperthyroidism.   Not on thyroid medications or amiodarone now  TSH wnl on this admission   TSH, FT4, TT3 all normal in Jan 2023   - no need for treatment at this time   - continue outpatient follow up with [facility]     #PCOS  Per documentation.   Regular menses at this time   - outpatient follow up       Tye Marques MD  Division of Endocrinology  Pager: [phone]    If after 6PM or before 9AM, or on weekends/holidays, please call endocrine answering service for assistance ([phone]).  For nonurgent matters email [email or web address] for assistance. [FreeTextEntry1] : PT HERE FOR FOLLOW UP FEELS OK \par WAS HOSPITALIZED AT [place] SPACER PLACED \par COMPLETED IIV ABX WITH CEFEPIME FOR 6 WEEKS CX  ALSO GREW CANDIDA AND PT PLACED ON DIFLUCAN\par CANDIDA OSTEOMYELITS IS RARE BUT PT WITH CANCER WILL PLAN SEVERAL MONTHS OF DIFLUCAN \par WILL RENEW DIFLUCAN \par WILL D/W DR NUNES ABOUT THE SPACER\par PT RPORTS SHEHAS RESTARTED CHECMOTHERAPY WILL D/W ONCOLOGY AND ORTHO \par MAY PLACE ON ORAL SUPRPESSIVE ABX\par \par

## 2023-03-10 NOTE — ED PROVIDER NOTE - DISPOSITION TYPE
Ophthalmology referral and Endocrinology referral exp will place new referrals and inform referral coordinator.          Pt to [address], RN  05/18/22 [id number] ADMIT

## 2023-04-14 NOTE — PATIENT PROFILE ADULT - NSTRANSFERBELONGINGSDISPO_GEN_A_NUR
[Coronary Artery Disease] : coronary artery disease [Systolic Heart Failure] : systolic heart failure [Stable] : stable [Compensated] : compensated [FreeTextEntry1] : \par Currently stable from a cardiovascular standpoint. Normotensive (low normal). Chronic systolic heart failure secondary to ischemic cardiomyopathy (history of late presentation anterior MI, LVEF 36-41% -> 19%). Currently appears euvolemic. No ischemic or CHF symptoms. At this time, patient is considered ACC/AHA CHF stage C. Continue current medications including bumetanide 2 mg daily, spironolactone and metoprolol succinate. ECG completed today and reviewed. Daily weights and low salt diet advised. Labs obtained today and reviewed (serum pro-BNP downtrending). Follow up in 2 months. Patient to follow up with CHF team. [EKG obtained to assist in diagnosis and management of assessed problem(s)] : EKG obtained to assist in diagnosis and management of assessed problem(s) given to family

## 2023-06-11 NOTE — INPATIENT CERTIFICATION FOR MEDICARE PATIENTS - IN ORDER TO MEET MEDICARE REQUIREMENTS.
In order to meet Medicare requirements, the clinical documentation must support the information cited in the admission order.  Please be sure to provide detailed and clear documentation about the following in the admitting note/history and physical: none

## 2023-06-27 NOTE — REVIEW OF SYSTEMS
MICU patient incubated @23.3 weeks with Acute Hypoxic Respiratory Failure, Pneumonia and Pulmonary edema; Patient has received 1 dose of Mifepristone 200 mg PO and Misoprostol 800mcg @ 1717 [Joint Pain] : joint pain [Negative] : Heme/Lymph

## 2023-08-19 NOTE — PATIENT PROFILE ADULT - NSPREOP1_UNDERSTANDSINFO_GEN_A_NUR
Hospitalist progress note  Pt admitted with chest pain. Down for testing . Chart reviewed. Cont current plan of care. yes

## 2024-03-28 NOTE — DIETITIAN NUTRITION RISK NOTIFICATION - ADDITIONAL COMMENTS/DIETITIAN RECOMMENDATIONS
[FreeTextEntry1] : + TOVA  1:160  and Joint pain   HPI:   30 y old M with PMH of BL wrist and hand pain  for last few years , followed by Orthopedic surgery - Hand Surgery [facility] , Active high school and college [employer] , Baseball , no major trauma,  3/2023 L foot pain midfoot with some swelling limiting activities evaluated by Podiatry suggested Boot for 4 month but imaging  6/23 X ray and MRI and CT scan of L foot was no sig changes, then PT tendinopathy, used Boot 6-10/23 no improvement in pain , 10/23 evaluated by other Podiatry removed boot used ankle brace , shoe inserts and Meloxicam 15 mg mostly daily for few month improved symptoms, and then after PT markedly improved, 80 % percent improvement,  2019  Wrist pain evaluated by Hand Surgery had MRI showed BL ECU tear and s/p repair R wrist 8/2019 , with improved pain, improved with OT  BL hand pain  R hand thumb and 2 digit also  pain , occasionally 3rd digit, L hand  2,3 pain with activity, with bl hand pain and discomfort throughout and worse with activity , no swelling ,  No sig back pain , but felt R hip discomfort with getting in and out of car   less then a week never had similar episode    Medications : Meloxicam   system review:   General: fatigue, weight change , fever, night sweats Eyes: pain, redness, loss of vision, double or blurred vision ,dryness Throat/Neck: frequent mouth sores , hoarseness , difficulty swallowing , pain in jaw while swallowing Mouth: mouth sores, dryness, loss of taste, recent increase in tooth cavities Ears: ringing in the years, loss of hearing, ear pain or discharge Nose: nose sores, loss of smell , nose bleeds Heat and Lungs : pain , irregular heartbeat, trouble breathing, cough, coughing of blood, loss of consciences , swollen legs or feet Blood: Anemia, bleeding tendency, easy bruising,  Stomach and Intestines: nausea, vomiting, heartburn, stomach pain , persistent diarrhea, blood in stool , black stool Kidney/Urine/Bladder: difficulty urination , frequent urination, blood in urine, cloudy urine,  discharge from penis/vagina, rash ulcers, vaginal dryness  Nervous system: headaches, dizziness, numbness or tingling hand/feet, muscle weakness , memory loss , seizures Psychiatric: difficulty falling asleep, difficulty staying asleep, depression, anxiety For Women only: Age period started, number of pregnancy, miscarriages, Menopause Skin: Rash, photosensitivity , hives, skin tightness, nodules , hair loss, color change of hands and feet with cold exposure ( Raynaud)    Muscle/Joints/Bones:  Morning stiffness  lasting , joint pain, joint swelling, muscle pain   FH : no autoimmune disease in family , smoking marijuana - helps with insomnia, alcohol occasional        Continue diet as tolerated.   Add Ensure Enlive TID to optimize po intake and provide an additional 350 kcal, 20g protein per serving.  Encourage po intake, monitor diet tolerance, and provide assistance at meals as needed.  Obtain daily weights to monitor trends.

## 2024-08-12 NOTE — REVIEW OF SYSTEMS
[Fully active, able to carry on all pre-disease performance without restriction] : Status 0 - Fully active, able to carry on all pre-disease performance without restriction [Normal] : affect appropriate [Negative] : Heme/Lymph

## 2024-10-01 NOTE — DIETITIAN INITIAL EVALUATION ADULT. - NSPROEDAABILITYLEARN_GEN_A_NUR
Instructions: This plan will send the code FBSE to the PM system.  DO NOT or CHANGE the price. Price (Do Not Change): 0.00 Detail Level: Generalized none

## 2024-11-13 NOTE — PHYSICAL THERAPY INITIAL EVALUATION ADULT - PHYSICAL ASSIST/NONPHYSICAL ASSIST: SUPINE/SIT, REHAB EVAL
History of chronic bipolar 1 disorder and follows with outpatient psychiatry and was seen inpatient  Mood has been stable and is maintained on Zyprexa, bupropion and trazodone as well as Ingrezza for tardive dyskinesia  Follow-up with psychiatry as an outpatient and consider virtual consultation if indicated for any changes while on ARC   1 person assist

## 2025-04-06 NOTE — DIETITIAN INITIAL EVALUATION ADULT. - SIGNS/SYMPTOMS
Pt transported via gurney on a continuous cardiac monitor    as evidenced by weight loss of 7.1% x ~3 month and mild muscle/fat loss noted on NFPE

## 2025-04-10 NOTE — OCCUPATIONAL THERAPY INITIAL EVALUATION ADULT - NS ASR WT BEARING DETAIL LLE
89 y/o F w/ PMH of PE, DVT, HTN, dyslipidemia, colonc ancer s/p partial colectomy w/ colostomy, ovarian Ca s/p ORA, glaucoma Brought in by EMS for decreased mental status, decreased oral intake, dark urine progressively worsening over the last few days according to family. She is usually A+Ox2 per family. In the ER found to hypothermic, hypotensive, bradycardic. Found to have +UA, give 3L IVF and started on vasopressors.     Unable to obtain history from patient due to clinical condition. 
partial weight-bearing